# Patient Record
Sex: FEMALE | Race: WHITE | Employment: UNEMPLOYED | URBAN - METROPOLITAN AREA
[De-identification: names, ages, dates, MRNs, and addresses within clinical notes are randomized per-mention and may not be internally consistent; named-entity substitution may affect disease eponyms.]

---

## 2017-02-22 ENCOUNTER — OFFICE VISIT (OUTPATIENT)
Dept: FAMILY MEDICINE CLINIC | Facility: CLINIC | Age: 57
End: 2017-02-22

## 2017-02-22 VITALS
SYSTOLIC BLOOD PRESSURE: 120 MMHG | BODY MASS INDEX: 24 KG/M2 | TEMPERATURE: 98 F | RESPIRATION RATE: 29 BRPM | WEIGHT: 151 LBS | HEART RATE: 68 BPM | OXYGEN SATURATION: 98 % | DIASTOLIC BLOOD PRESSURE: 78 MMHG

## 2017-02-22 DIAGNOSIS — M54.12 CERVICAL RADICULAR PAIN: Primary | ICD-10-CM

## 2017-02-22 DIAGNOSIS — M79.671 FOOT PAIN, RIGHT: ICD-10-CM

## 2017-02-22 DIAGNOSIS — L57.0 ACTINIC KERATOSIS: ICD-10-CM

## 2017-02-22 PROCEDURE — 99214 OFFICE O/P EST MOD 30 MIN: CPT | Performed by: FAMILY MEDICINE

## 2017-02-22 RX ORDER — ONDANSETRON 8 MG/1
8 TABLET, ORALLY DISINTEGRATING ORAL EVERY 8 HOURS PRN
Qty: 20 TABLET | Refills: 0 | Status: SHIPPED | OUTPATIENT
Start: 2017-02-22 | End: 2017-03-04 | Stop reason: ALTCHOICE

## 2017-02-22 NOTE — PROGRESS NOTES
Here with episodes of a heavy sensation when she wakes up in both arms. No tingling. It promptly resolves. She has had a history of some neck pain. She underwent some physical therapy of few years back which did help.   The neck pain is intermittent and 1,000 Units by mouth daily. Disp:  Rfl:    GLUCOSAMINE CHONDROITIN COMPLX OR Take  by mouth. Disp:  Rfl:    Pyridoxine HCl (VITAMIN B-6 OR) Take  by mouth. Take one tablet daily Disp:  Rfl:    Multiple Vitamins-Minerals (MULTIVITAMIN OR) Take  by mouth.  Alice Garcia stress related. History of psoriatic arthritis in the hands, will need a follow-up in the neck. #2 right foot pain #3 actinic keratosis    Plans x-rays of the cervical spine. X-ray of the right foot.   Follow-up for repeat cryotherapy of the nose if nece

## 2017-02-25 ENCOUNTER — HOSPITAL ENCOUNTER (OUTPATIENT)
Dept: GENERAL RADIOLOGY | Age: 57
Discharge: HOME | End: 2017-02-25
Attending: FAMILY MEDICINE
Payer: COMMERCIAL

## 2017-02-25 DIAGNOSIS — M79.671 FOOT PAIN, RIGHT: ICD-10-CM

## 2017-02-25 DIAGNOSIS — M54.12 CERVICAL RADICULAR PAIN: ICD-10-CM

## 2017-02-25 PROCEDURE — 72050 X-RAY EXAM NECK SPINE 4/5VWS: CPT

## 2017-02-25 PROCEDURE — 73630 X-RAY EXAM OF FOOT: CPT

## 2017-03-04 ENCOUNTER — OFFICE VISIT (OUTPATIENT)
Dept: FAMILY MEDICINE CLINIC | Facility: CLINIC | Age: 57
End: 2017-03-04

## 2017-03-04 VITALS
SYSTOLIC BLOOD PRESSURE: 104 MMHG | HEART RATE: 69 BPM | BODY MASS INDEX: 23.98 KG/M2 | OXYGEN SATURATION: 98 % | WEIGHT: 151 LBS | RESPIRATION RATE: 20 BRPM | HEIGHT: 66.5 IN | TEMPERATURE: 98 F | DIASTOLIC BLOOD PRESSURE: 70 MMHG

## 2017-03-04 DIAGNOSIS — J01.00 ACUTE MAXILLARY SINUSITIS, RECURRENCE NOT SPECIFIED: ICD-10-CM

## 2017-03-04 DIAGNOSIS — H66.002 ACUTE SUPPURATIVE OTITIS MEDIA OF LEFT EAR WITHOUT SPONTANEOUS RUPTURE OF TYMPANIC MEMBRANE, RECURRENCE NOT SPECIFIED: Primary | ICD-10-CM

## 2017-03-04 PROCEDURE — 99213 OFFICE O/P EST LOW 20 MIN: CPT | Performed by: NURSE PRACTITIONER

## 2017-03-04 RX ORDER — FLUTICASONE PROPIONATE 50 MCG
2 SPRAY, SUSPENSION (ML) NASAL DAILY
Qty: 1 BOTTLE | Refills: 0 | Status: SHIPPED | OUTPATIENT
Start: 2017-03-04 | End: 2017-05-30

## 2017-03-04 RX ORDER — CLARITHROMYCIN 500 MG/1
500 TABLET, COATED ORAL 2 TIMES DAILY
Qty: 20 TABLET | Refills: 0 | Status: SHIPPED | OUTPATIENT
Start: 2017-03-04 | End: 2017-03-14

## 2017-03-04 NOTE — PROGRESS NOTES
CHIEF COMPLAINT:   Patient presents with:  Ear Pain: Left ear for 1 week      HPI:   Lele Casey is a 55year old female who presents to clinic today with complaints of left ear pain. Has had for 7  days. Pain is described as sharp, intermittent.   R • Acute maxillary sinusitis    • Acute serous otitis media    • Impacted cerumen    • Chronic cystic mastitis    • Personal history of traumatic brain injury      concussion   • Diarrhea    • Normal delivery       Social History:    Smoking Status: Never S Acute maxillary sinusitis, recurrence not specified    PLAN: Meds as listed below.   Comfort measures as described in Patient Instructions    Meds & Refills for this Visit:    Signed Prescriptions Disp Refills    clarithromycin 500 MG Oral Tab 20 tablet 0 · Over-the-counter decongestants may be used unless a similar medicine was prescribed. Nasal sprays work the fastest. Use one that contains phenylephrine or oxymetazoline. First blow the nose gently. Then use the spray.  Do not use these medicines more ofte © 3420-2984 [address], [address] [place]. All rights reserved. This information is not intended as a substitute for professional medical care. Always follow your healthcare professional's instructions.               P

## 2017-03-05 ENCOUNTER — PATIENT MESSAGE (OUTPATIENT)
Dept: FAMILY MEDICINE CLINIC | Facility: CLINIC | Age: 57
End: 2017-03-05

## 2017-03-06 NOTE — TELEPHONE ENCOUNTER
From: Michael Cervantes  To: Lars Sever, DO  Sent: 3/5/2017 8:34 PM CST  Subject: Prescription Question    Hi,  Could you please renew my Estrace cream prescription at [place] [address]? Thanks!    Jillian Montano

## 2017-03-06 NOTE — TELEPHONE ENCOUNTER
Hi,  Could you please renew my Estrace [facility]? Thanks! R Jack Chen 118 to fill?   Last [facility] 7/2016

## 2017-03-17 RX ORDER — ESTRADIOL 0.1 MG/G
CREAM VAGINAL
Qty: 60 G | Refills: 0 | COMMUNITY
Start: 2017-03-17 | End: 2019-02-07 | Stop reason: ALTCHOICE

## 2017-05-23 ENCOUNTER — OFFICE VISIT (OUTPATIENT)
Dept: FAMILY MEDICINE CLINIC | Facility: CLINIC | Age: 57
End: 2017-05-23

## 2017-05-23 VITALS
SYSTOLIC BLOOD PRESSURE: 118 MMHG | DIASTOLIC BLOOD PRESSURE: 80 MMHG | TEMPERATURE: 98 F | HEART RATE: 78 BPM | OXYGEN SATURATION: 98 % | HEIGHT: 66 IN | WEIGHT: 152 LBS | RESPIRATION RATE: 29 BRPM | BODY MASS INDEX: 24.43 KG/M2

## 2017-05-23 DIAGNOSIS — L40.4 GUTTATE PSORIASIS: Primary | ICD-10-CM

## 2017-05-23 PROCEDURE — 99213 OFFICE O/P EST LOW 20 MIN: CPT | Performed by: INTERNAL MEDICINE

## 2017-05-23 RX ORDER — PREDNISONE 20 MG/1
TABLET ORAL
Qty: 10 TABLET | Refills: 0 | Status: SHIPPED | OUTPATIENT
Start: 2017-05-23 | End: 2017-05-30

## 2017-05-24 NOTE — PROGRESS NOTES
Isabell Galarza is a 55year old female. HPI:   Here with flare up of guttate eczema. Recalls years ago having a severe rash, seen frequently by Derm and had light box treatments.    Under a lot of stress recently with end of the school year and her son l mastitis    • Personal history of traumatic brain injury      concussion   • Diarrhea    • Normal delivery       Social History:    Smoking Status: Never Smoker                      Smokeless Status: Never Used                        Alcohol Use: Yes

## 2017-05-30 ENCOUNTER — HOSPITAL ENCOUNTER (OUTPATIENT)
Dept: MAMMOGRAPHY | Age: 57
Discharge: HOME | End: 2017-05-30
Attending: FAMILY MEDICINE
Payer: COMMERCIAL

## 2017-05-30 ENCOUNTER — OFFICE VISIT (OUTPATIENT)
Dept: FAMILY MEDICINE CLINIC | Facility: CLINIC | Age: 57
End: 2017-05-30

## 2017-05-30 VITALS
HEIGHT: 66 IN | WEIGHT: 152 LBS | DIASTOLIC BLOOD PRESSURE: 84 MMHG | RESPIRATION RATE: 16 BRPM | BODY MASS INDEX: 24.43 KG/M2 | TEMPERATURE: 98 F | HEART RATE: 84 BPM | SYSTOLIC BLOOD PRESSURE: 128 MMHG

## 2017-05-30 DIAGNOSIS — Z12.31 ENCOUNTER FOR SCREENING MAMMOGRAM FOR BREAST CANCER: ICD-10-CM

## 2017-05-30 DIAGNOSIS — G50.0 TRIGEMINAL NEURALGIA OF LEFT SIDE OF FACE: Primary | ICD-10-CM

## 2017-05-30 PROCEDURE — 77067 SCR MAMMO BI INCL CAD: CPT | Performed by: FAMILY MEDICINE

## 2017-05-30 PROCEDURE — 77063 BREAST TOMOSYNTHESIS BI: CPT | Performed by: FAMILY MEDICINE

## 2017-05-30 PROCEDURE — 99213 OFFICE O/P EST LOW 20 MIN: CPT | Performed by: FAMILY MEDICINE

## 2017-05-30 RX ORDER — GABAPENTIN 100 MG/1
CAPSULE ORAL
Qty: 270 CAPSULE | Refills: 0 | OUTPATIENT
Start: 2017-05-30

## 2017-05-30 RX ORDER — GABAPENTIN 100 MG/1
100 CAPSULE ORAL 3 TIMES DAILY
Qty: 15 CAPSULE | Refills: 0 | Status: SHIPPED | OUTPATIENT
Start: 2017-05-30 | End: 2017-08-07

## 2017-05-30 NOTE — PROGRESS NOTES
Here with a 2 day episode of numbness on the left cheek. Has a history of left facial tingling. We have discussed trigeminal neuralgia in the past.  In the fall we performed extensive laboratory workup that was unremarkable.   She has never had chickenpox Vitamins-Minerals (MULTIVITAMIN OR) Take  by mouth. Disp:  Rfl:    Cholecalciferol (VITAMIN D) 1000 UNITS Oral Tab Take  by mouth. Disp:  Rfl:    Fish Oil-Cholecalciferol (FISH OIL + D3 OR) Take 200 mg by mouth. Disp:  Rfl:      ALLERGIES:   Allergy;  Veryl Large

## 2017-08-02 ENCOUNTER — OFFICE VISIT (OUTPATIENT)
Dept: FAMILY MEDICINE CLINIC | Facility: CLINIC | Age: 57
End: 2017-08-02

## 2017-08-02 VITALS
DIASTOLIC BLOOD PRESSURE: 70 MMHG | RESPIRATION RATE: 16 BRPM | WEIGHT: 152 LBS | HEIGHT: 66 IN | SYSTOLIC BLOOD PRESSURE: 112 MMHG | HEART RATE: 68 BPM | BODY MASS INDEX: 24.43 KG/M2 | TEMPERATURE: 99 F

## 2017-08-02 DIAGNOSIS — R05.9 COUGH: ICD-10-CM

## 2017-08-02 DIAGNOSIS — H69.82 EUSTACHIAN TUBE DYSFUNCTION, LEFT: Primary | ICD-10-CM

## 2017-08-02 DIAGNOSIS — J34.89 SINUS PRESSURE: ICD-10-CM

## 2017-08-02 PROCEDURE — 99213 OFFICE O/P EST LOW 20 MIN: CPT | Performed by: FAMILY MEDICINE

## 2017-08-02 RX ORDER — PREDNISONE 20 MG/1
TABLET ORAL
Qty: 14 TABLET | Refills: 0 | Status: SHIPPED | OUTPATIENT
Start: 2017-08-02 | End: 2017-08-07

## 2017-08-02 NOTE — PROGRESS NOTES
HPI:    Patient ID: Elvira Bautista is a 55year old female. Ear Pain    There is pain in the left ear. This is a recurrent problem. The current episode started in the past 7 days. The problem occurs constantly. The problem has been waxing and waning. OR) Take  by mouth. Take one tablet daily Disp:  Rfl:    Multiple Vitamins-Minerals (MULTIVITAMIN OR) Take  by mouth. Disp:  Rfl:    Cholecalciferol (VITAMIN D) 1000 UNITS Oral Tab Take  by mouth.  Disp:  Rfl:    Fish Oil-Cholecalciferol (FISH OIL + D3 OR) predniSONE 20 MG Oral Tab; 2.5 tab day 1-4, 2 tab day 5, 1.5 tab day 6, 0.5 tab day 7  Dispense: 14 tablet; Refill: 0    3. Cough  - predniSONE 20 MG Oral Tab; 2.5 tab day 1-4, 2 tab day 5, 1.5 tab day 6, 0.5 tab day 7  Dispense: 14 tablet;  Refill: 0    No

## 2017-08-07 ENCOUNTER — OFFICE VISIT (OUTPATIENT)
Dept: FAMILY MEDICINE CLINIC | Facility: CLINIC | Age: 57
End: 2017-08-07

## 2017-08-07 VITALS
TEMPERATURE: 99 F | WEIGHT: 153 LBS | SYSTOLIC BLOOD PRESSURE: 122 MMHG | OXYGEN SATURATION: 98 % | HEART RATE: 82 BPM | RESPIRATION RATE: 18 BRPM | DIASTOLIC BLOOD PRESSURE: 80 MMHG | HEIGHT: 66.5 IN | BODY MASS INDEX: 24.3 KG/M2

## 2017-08-07 DIAGNOSIS — Z01.419 WELL WOMAN EXAM WITH ROUTINE GYNECOLOGICAL EXAM: Primary | ICD-10-CM

## 2017-08-07 DIAGNOSIS — I34.1 MVP (MITRAL VALVE PROLAPSE): ICD-10-CM

## 2017-08-07 DIAGNOSIS — D22.9 ATYPICAL NEVI: ICD-10-CM

## 2017-08-07 DIAGNOSIS — Z12.31 ENCOUNTER FOR SCREENING MAMMOGRAM FOR BREAST CANCER: ICD-10-CM

## 2017-08-07 PROCEDURE — 99396 PREV VISIT EST AGE 40-64: CPT | Performed by: FAMILY MEDICINE

## 2017-08-07 NOTE — PROGRESS NOTES
HPI:   Isabell Galarza is a 55year old female who presents for a complete physical exam.     Wt Readings from Last 6 Encounters:  08/07/17 : 153 lb  08/02/17 : 152 lb  05/30/17 : 152 lb  05/23/17 : 152 lb  03/04/17 : 151 lb  02/22/17 : 151 lb    Body mas cerumen  No date: Normal delivery  No date: Other acute otitis externa  No date: Personal history of traumatic brain injury      Comment: concussion   No past surgical history on file.    Family History   Problem Relation Age of Onset   • Heart Disease Fath genitalia - no inguinal LAD, no lesions. Speculum exam- introitus is normal,scant discharge,cervix is pink.  Bimanual exam- no adnexal masses or tenderness  RECTAL:good rectal tone,no mass, brown stool, stool is OB negative  MUSCULOSKELETAL: back is not te

## 2017-09-16 ENCOUNTER — PATIENT MESSAGE (OUTPATIENT)
Dept: FAMILY MEDICINE CLINIC | Facility: CLINIC | Age: 57
End: 2017-09-16

## 2017-09-18 NOTE — TELEPHONE ENCOUNTER
From: Christa Cano  To: Sharon Hodges DO  Sent: 9/16/2017 7:40 PM CDT  Subject: Visit Follow-up Question    Hi Dr. Abhinav Smith,   When I saw you in August, we talked about an area of concern on the back of my neck.  You had said you'd refer me to a dermato

## 2017-09-20 ENCOUNTER — PATIENT MESSAGE (OUTPATIENT)
Dept: FAMILY MEDICINE CLINIC | Facility: CLINIC | Age: 57
End: 2017-09-20

## 2017-09-20 DIAGNOSIS — R21 RASH: ICD-10-CM

## 2017-09-20 DIAGNOSIS — Z13.89 SKIN CONDITION SCREENING: Primary | ICD-10-CM

## 2017-09-21 NOTE — TELEPHONE ENCOUNTER
----- Message from Del Silver sent at 9/20/2017  9:06 PM CDT -----  Regarding: Visit Follow-up Question  Contact: [phone]  Hi,  I asked for a referral to the dermatologist,  and was asked who I see. I see Dr Ninoska Samuels in [place].   By the way, y

## 2017-10-06 ENCOUNTER — OFFICE VISIT (OUTPATIENT)
Dept: FAMILY MEDICINE CLINIC | Facility: CLINIC | Age: 57
End: 2017-10-06

## 2017-10-06 VITALS
HEIGHT: 66 IN | SYSTOLIC BLOOD PRESSURE: 106 MMHG | OXYGEN SATURATION: 98 % | WEIGHT: 153 LBS | BODY MASS INDEX: 24.59 KG/M2 | RESPIRATION RATE: 16 BRPM | HEART RATE: 70 BPM | DIASTOLIC BLOOD PRESSURE: 78 MMHG | TEMPERATURE: 98 F

## 2017-10-06 DIAGNOSIS — H66.005 RECURRENT ACUTE SUPPURATIVE OTITIS MEDIA WITHOUT SPONTANEOUS RUPTURE OF LEFT TYMPANIC MEMBRANE: ICD-10-CM

## 2017-10-06 DIAGNOSIS — J01.00 ACUTE MAXILLARY SINUSITIS, RECURRENCE NOT SPECIFIED: Primary | ICD-10-CM

## 2017-10-06 DIAGNOSIS — H69.82 DYSFUNCTION OF LEFT EUSTACHIAN TUBE: ICD-10-CM

## 2017-10-06 PROCEDURE — 99213 OFFICE O/P EST LOW 20 MIN: CPT | Performed by: PHYSICIAN ASSISTANT

## 2017-10-06 RX ORDER — CLARITHROMYCIN 500 MG/1
500 TABLET, COATED ORAL 2 TIMES DAILY
Qty: 20 TABLET | Refills: 0 | Status: SHIPPED | OUTPATIENT
Start: 2017-10-06 | End: 2017-11-07 | Stop reason: ALTCHOICE

## 2017-10-06 NOTE — PROGRESS NOTES
HPI:   Robbie Lopez is a 65year old female who presents for sinus symptoms for  2  weeks. Patient reports congestion, ear pain, sinus pain, prior history of sinusitis, denies fever, denies cough. Pt has history of sinus infections and ear infection.  P Smokeless tobacco: Never Used                      Alcohol use:  Yes              Comment: social          REVIEW OF SYSTEMS:   GENERAL: feels well otherwise  SKIN: no rashes  EYES:denies blurred vision or double vision  HEENT:+congestion, +left si measures. Follow up if symptoms persist or worsen. Jacey Bowden was given an opportunity to ask questions and verbalized understanding of care.

## 2017-10-20 ENCOUNTER — OFFICE VISIT (OUTPATIENT)
Dept: FAMILY MEDICINE CLINIC | Facility: CLINIC | Age: 57
End: 2017-10-20

## 2017-10-20 VITALS
WEIGHT: 153 LBS | BODY MASS INDEX: 24.59 KG/M2 | SYSTOLIC BLOOD PRESSURE: 114 MMHG | HEIGHT: 66 IN | DIASTOLIC BLOOD PRESSURE: 86 MMHG | RESPIRATION RATE: 16 BRPM | TEMPERATURE: 99 F | HEART RATE: 98 BPM

## 2017-10-20 DIAGNOSIS — S16.1XXA STRAIN OF NECK MUSCLE, INITIAL ENCOUNTER: Primary | ICD-10-CM

## 2017-10-20 DIAGNOSIS — F41.9 ANXIETY: ICD-10-CM

## 2017-10-20 DIAGNOSIS — M54.10 RADICULOPATHY, UNSPECIFIED SPINAL REGION: ICD-10-CM

## 2017-10-20 PROCEDURE — 99214 OFFICE O/P EST MOD 30 MIN: CPT | Performed by: FAMILY MEDICINE

## 2017-10-20 RX ORDER — ALPRAZOLAM 0.25 MG/1
0.25 TABLET ORAL 2 TIMES DAILY PRN
Qty: 20 TABLET | Refills: 0 | Status: SHIPPED | OUTPATIENT
Start: 2017-10-20 | End: 2017-11-07 | Stop reason: ALTCHOICE

## 2017-10-20 RX ORDER — CYCLOBENZAPRINE HCL 5 MG
5 TABLET ORAL NIGHTLY PRN
Qty: 30 TABLET | Refills: 1 | Status: SHIPPED | OUTPATIENT
Start: 2017-10-20 | End: 2018-05-08

## 2017-10-24 NOTE — PROGRESS NOTES
HPI:    Patient ID: Ronald Hollins is a 65year old female. Neck Pain    This is a chronic problem. The current episode started more than 1 month ago. The problem occurs constantly. The problem has been gradually worsening.  The pain is present in the pharmacist at [facility]. Pharmacy. Disp: 60 g Rfl: 0   Vitamin C 500 MG Oral Tab Take 500 mg by mouth daily.  Disp:  Rfl:    Levothyroxine Sodium 75 MCG Oral Tab TAKE 1 TABLET BY MOUTH EVERY DAY BEFORE BREAKFAST Disp: 90 tablet Rfl: 3   Calcium Citrate (CI Vitals reviewed. ASSESSMENT/PLAN:   Strain of neck muscle, initial encounter  (primary encounter diagnosis)  Anxiety  Radiculopathy, unspecified spinal region    1.  Strain of neck muscle, initial encounter  - Cyclobenzaprine HCl 5 MG Oral Tab

## 2017-11-07 ENCOUNTER — OFFICE VISIT (OUTPATIENT)
Dept: FAMILY MEDICINE CLINIC | Facility: CLINIC | Age: 57
End: 2017-11-07

## 2017-11-07 VITALS
HEIGHT: 67 IN | SYSTOLIC BLOOD PRESSURE: 118 MMHG | TEMPERATURE: 98 F | RESPIRATION RATE: 16 BRPM | DIASTOLIC BLOOD PRESSURE: 78 MMHG | WEIGHT: 152 LBS | HEART RATE: 72 BPM | BODY MASS INDEX: 23.86 KG/M2

## 2017-11-07 DIAGNOSIS — F41.9 ANXIETY: ICD-10-CM

## 2017-11-07 DIAGNOSIS — Z23 FLU VACCINE NEED: ICD-10-CM

## 2017-11-07 DIAGNOSIS — G50.0 TRIGEMINAL NEURALGIA OF LEFT SIDE OF FACE: Primary | ICD-10-CM

## 2017-11-07 PROCEDURE — 90686 IIV4 VACC NO PRSV 0.5 ML IM: CPT | Performed by: FAMILY MEDICINE

## 2017-11-07 PROCEDURE — 90471 IMMUNIZATION ADMIN: CPT | Performed by: FAMILY MEDICINE

## 2017-11-07 PROCEDURE — 99213 OFFICE O/P EST LOW 20 MIN: CPT | Performed by: FAMILY MEDICINE

## 2017-11-07 NOTE — PROGRESS NOTES
Here with off again on again discomfort in the right side of the face consistent with trigeminal neuralgia. She also having discomfort in the neck.   She finds that she and her  go out of town for the weekend and she has a different bed, the pain is (CITRACAL OR) Take  by mouth. Disp:  Rfl:    vitamin E 1000 UNITS Oral Cap Take 1,000 Units by mouth daily. Disp:  Rfl:    GLUCOSAMINE CHONDROITIN COMPLX OR Take  by mouth. Disp:  Rfl:    Pyridoxine HCl (VITAMIN B-6 OR) Take  by mouth.  Take one tablet shaina psychologist of the office.

## 2017-11-08 ENCOUNTER — OFFICE VISIT (OUTPATIENT)
Dept: PHYSICAL THERAPY | Age: 57
End: 2017-11-08
Attending: FAMILY MEDICINE
Payer: COMMERCIAL

## 2017-11-08 DIAGNOSIS — S16.1XXA STRAIN OF NECK MUSCLE, INITIAL ENCOUNTER: ICD-10-CM

## 2017-11-08 DIAGNOSIS — M54.10 RADICULOPATHY, UNSPECIFIED SPINAL REGION: ICD-10-CM

## 2017-11-08 PROCEDURE — 97110 THERAPEUTIC EXERCISES: CPT

## 2017-11-08 PROCEDURE — 97161 PT EVAL LOW COMPLEX 20 MIN: CPT

## 2017-11-09 NOTE — PROGRESS NOTES
SPINE EVALUATION:   Referring Physician: Dr. Satnam De La O  Diagnosis: cervical neck pain R upper trap  L trigeminal nerve pain  Date of Service: 11/9/2017     PATIENT SUMMARY   Mac Artis is a 65year old y/o female who presents to therapy today with com looking over her shoulder  Neuro Screen: normal today. No loss of motor control in the facial muscles. No numbness or tingling into the UE's. Pt does report waking up when she lays on her left side in which her arm feels like it fell asleep.  Improved withi promote upright posturing and decreased pain with ADls  (10 visits)  · Pt will report decreased frequency of headaches to <0x/week (10 visits)  · Pt will demonstrate improved cervical intrinsic strength to 5/5 to allow improved cervical stabilization with

## 2017-11-10 ENCOUNTER — OFFICE VISIT (OUTPATIENT)
Dept: PHYSICAL THERAPY | Age: 57
End: 2017-11-10
Attending: FAMILY MEDICINE
Payer: COMMERCIAL

## 2017-11-10 PROCEDURE — 97110 THERAPEUTIC EXERCISES: CPT

## 2017-11-10 PROCEDURE — 97140 MANUAL THERAPY 1/> REGIONS: CPT

## 2017-11-12 NOTE — PROGRESS NOTES
Dx:  Cervical spine pain / TMJ L dysfunction      Authorized # of Visits:   10       Next MD visit: none scheduled  Fall Risk: standard         Precautions: n/a             Subjective:  Sore following the eval. Very stiff today.  Stressed at school today an to maintain progress achieved in PT (10 visits)    Plan:  Cont with PT- issue rows in standing next appt. Retractions on the wall.    Date: 11/11/2017 TX#: 2/ 10  Date:               TX#: 3/   Date:               TX#: 4/ Date:               TX#: 5/ Date:

## 2017-11-15 ENCOUNTER — OFFICE VISIT (OUTPATIENT)
Dept: PHYSICAL THERAPY | Age: 57
End: 2017-11-15
Attending: FAMILY MEDICINE
Payer: COMMERCIAL

## 2017-11-15 PROCEDURE — 97110 THERAPEUTIC EXERCISES: CPT

## 2017-11-15 PROCEDURE — 97140 MANUAL THERAPY 1/> REGIONS: CPT

## 2017-11-15 NOTE — PROGRESS NOTES
Dx:  Cervical spine pain / TMJ L dysfunction      Authorized # of Visits:   10       Next MD visit: none scheduled  Fall Risk: standard         Precautions: n/a             Subjective:  Sore following the eval. Very stiff today.  Stressed at school today an TX#: 4/ Date:               TX#: 5/ Date:               TX#: 6/ Date:               TX#: 7/ Date:               TX#: 8/   UBE 1 mins fwd  1 mins bkwd UBE 1 min fwd  1 min bkwd        Prone Upper trap STM  T4-T12 interlocking mobs grade 1-2  A

## 2017-11-17 ENCOUNTER — OFFICE VISIT (OUTPATIENT)
Dept: PHYSICAL THERAPY | Age: 57
End: 2017-11-17
Attending: FAMILY MEDICINE
Payer: COMMERCIAL

## 2017-11-17 PROCEDURE — 97140 MANUAL THERAPY 1/> REGIONS: CPT

## 2017-11-17 PROCEDURE — 97110 THERAPEUTIC EXERCISES: CPT

## 2017-11-17 NOTE — PROGRESS NOTES
Dx:  Cervical spine pain / TMJ L dysfunction      Authorized # of Visits:   10       Next MD visit: none scheduled  Fall Risk: standard         Precautions: n/a             Subjective: after last appt my head was really bad and my right shoulder .  Medial s Date:               TX#: 3/10  11/15/17 Date:               TX#: 4/10  11/17/17 Date:               TX#: 5/ Date:               TX#: 6/ Date:               TX#: 7/ Date:               TX#: 8/   UBE 1 mins fwd  1 mins bkwd UBE 1 min fwd  1 min bkwd UBE warm

## 2017-11-20 ENCOUNTER — HOSPITAL ENCOUNTER (OUTPATIENT)
Dept: CV DIAGNOSTICS | Facility: HOSPITAL | Age: 57
Discharge: HOME | End: 2017-11-20
Attending: FAMILY MEDICINE
Payer: COMMERCIAL

## 2017-11-20 DIAGNOSIS — I34.1 MVP (MITRAL VALVE PROLAPSE): ICD-10-CM

## 2017-11-20 PROCEDURE — 93306 TTE W/DOPPLER COMPLETE: CPT | Performed by: FAMILY MEDICINE

## 2017-11-21 ENCOUNTER — OFFICE VISIT (OUTPATIENT)
Dept: PHYSICAL THERAPY | Age: 57
End: 2017-11-21
Attending: FAMILY MEDICINE
Payer: COMMERCIAL

## 2017-11-21 PROCEDURE — 97110 THERAPEUTIC EXERCISES: CPT

## 2017-11-21 PROCEDURE — 97140 MANUAL THERAPY 1/> REGIONS: CPT

## 2017-11-21 NOTE — PROGRESS NOTES
Dx:  Cervical spine pain / TMJ L dysfunction      Authorized # of Visits:   10       Next MD visit: none scheduled  Fall Risk: standard         Precautions: n/a             Subjective: best I have felt in a long time. I can look both ways.  Not sure if it i fwd  1 mins bkwd UBE 1 min fwd  1 min bkwd UBE warm up 3 mins  UBE 2 mins fwd  2 mins bkwd      Prone Upper trap STM  T4-T12 interlocking mobs grade 1-2  Attempted cervical PA glides grade 1 too painful  Occipital release  L TMJ STM internal and external

## 2017-11-29 ENCOUNTER — OFFICE VISIT (OUTPATIENT)
Dept: PHYSICAL THERAPY | Age: 57
End: 2017-11-29
Attending: FAMILY MEDICINE
Payer: COMMERCIAL

## 2017-11-29 PROCEDURE — 97110 THERAPEUTIC EXERCISES: CPT

## 2017-11-29 PROCEDURE — 97140 MANUAL THERAPY 1/> REGIONS: CPT

## 2017-11-30 NOTE — PROGRESS NOTES
Dx:  Cervical spine pain / TMJ L dysfunction      Authorized # of Visits:   10       Next MD visit: none scheduled  Fall Risk: standard         Precautions: n/a             Subjective:  I feel less stress. Pain in the SI joints ( per pt demo today).  I just 5/5 to promote improved upright posturing and decreased pain with ADLs  (10 visits)  · Pt will be independent and compliant with comprehensive HEP to maintain progress achieved in PT (10 visits)    Plan:  Cont with PT- reassess next visit to send a PN to [facility] holds                        MHP 10 mins thoracic and cervical spine   MHP 10 mins                        Skilled Services:  Manual PT today and skilled stretches issued to pt     Charges:  MT 2 EX 1      Total Timed Treatment:  45  min  Total Treatment Ti

## 2017-12-01 ENCOUNTER — APPOINTMENT (OUTPATIENT)
Dept: PHYSICAL THERAPY | Age: 57
End: 2017-12-01
Attending: FAMILY MEDICINE
Payer: COMMERCIAL

## 2017-12-06 ENCOUNTER — OFFICE VISIT (OUTPATIENT)
Dept: PHYSICAL THERAPY | Age: 57
End: 2017-12-06
Attending: FAMILY MEDICINE
Payer: COMMERCIAL

## 2017-12-06 PROCEDURE — 97110 THERAPEUTIC EXERCISES: CPT

## 2017-12-06 PROCEDURE — 97140 MANUAL THERAPY 1/> REGIONS: CPT

## 2017-12-07 NOTE — PROGRESS NOTES
Dx:  Cervical spine pain / TMJ L dysfunction      Authorized # of Visits:   10       Next MD visit: none scheduled  Fall Risk: standard         Precautions: n/a             Subjective:   I feel less stressed today.  Feeling a little more tingling into the L will be independent and compliant with comprehensive HEP to maintain progress achieved in PT (10 visits)- improved    Plan:   Cont with PT for thoracic and cervical ROM and strength. Improve posture.  Pt has made gains with less radicular pain in the L medi thoracic extension  5 mins   foam  1/2 roller   5 mins   Rows with red band  Retractions and Post PT   Rows red band  10 x    Lateral side bend Wall push ups  10 x 2     cervical retractions with 10 sec holds  Ball at head 10 reps 10 x     UT and levator s

## 2017-12-12 ENCOUNTER — OFFICE VISIT (OUTPATIENT)
Dept: PHYSICAL THERAPY | Age: 57
End: 2017-12-12
Attending: FAMILY MEDICINE
Payer: COMMERCIAL

## 2017-12-12 PROCEDURE — 97110 THERAPEUTIC EXERCISES: CPT

## 2017-12-12 PROCEDURE — 97140 MANUAL THERAPY 1/> REGIONS: CPT

## 2017-12-12 NOTE — PROGRESS NOTES
Dx:  Cervical spine pain / TMJ L dysfunction      Authorized # of Visits:   10       Next MD visit: none scheduled  Fall Risk: standard         Precautions: n/a             Subjective:   I feel much better, still I know I need to talk to someone about my s comprehensive HEP to maintain progress achieved in PT (10 visits)- improved    Plan:   Cont with PT for thoracic and cervical ROM and strength. Improve posture. Pt has made gains with less radicular pain in the L medial scap region.  Pt does not stretch fol bouts grade 4-5    C spine PA glides grade 2-3   Mult bouts  STM to Upper trap bilaterally     15 mins   Foam and sheets for thoracic extension  5 mins   foam  1/2 roller   5 mins   Rows with red band  Retractions and Post PT   Rows red band  10 x    Later

## 2017-12-21 ENCOUNTER — OFFICE VISIT (OUTPATIENT)
Dept: PHYSICAL THERAPY | Age: 57
End: 2017-12-21
Attending: FAMILY MEDICINE
Payer: COMMERCIAL

## 2017-12-21 PROCEDURE — 97110 THERAPEUTIC EXERCISES: CPT

## 2017-12-21 PROCEDURE — 97140 MANUAL THERAPY 1/> REGIONS: CPT

## 2017-12-22 NOTE — PROGRESS NOTES
Dx:  Cervical spine pain / TMJ L dysfunction      Authorized # of Visits:   10       Next MD visit: none scheduled  Fall Risk: standard         Precautions: n/a             Subjective:   2/10 neck pain, feeling much better, last day of school today so a lo Pt has made gains with less radicular pain in the L medial scap region. Pt does not stretch following her training sessions - encouraged to perform her HEP to help keep the cervical spine length carry over and avoid possible injuries.  Cont with PT for 2x w mins   Foam and sheets for thoracic extension  5 mins   foam  1/2 roller   5 mins   Rows with red band  Retractions and Post PT   Rows red band  10 x    Lateral side bend Wall push ups  10 x 2     cervical retractions with 10 sec holds  Ball at head 10 rep

## 2017-12-26 ENCOUNTER — OFFICE VISIT (OUTPATIENT)
Dept: PHYSICAL THERAPY | Age: 57
End: 2017-12-26
Attending: FAMILY MEDICINE
Payer: COMMERCIAL

## 2017-12-26 PROCEDURE — 97140 MANUAL THERAPY 1/> REGIONS: CPT

## 2017-12-26 PROCEDURE — 97110 THERAPEUTIC EXERCISES: CPT

## 2017-12-26 NOTE — PROGRESS NOTES
Dx:  Cervical spine pain / TMJ L dysfunction      Authorized # of Visits:   10       Next MD visit: none scheduled  Fall Risk: standard         Precautions: n/a             Subjective:    Feeling pretty good. Less numbness in my L jaw.  I do feel that my pa achieved in PT (10 visits)- improved    Plan: pt was planning on cleaning today- review of stretches and to avoid over head activities. Assess effects of manual therapy next appt.    Date: 11/11/2017 TX#: 2/8 Date:               TX#: 3/8  11/15/17 Date: manipulation T4-T12 mult bouts   Grade 1-2 cervical spine bilaterally     Distraction with pt in supine     25 mins total   Foam and sheets for thoracic extension  5 mins   foam  1/2 roller   5 mins   Rows with red band  Retractions and Post PT   Rows red

## 2017-12-28 ENCOUNTER — OFFICE VISIT (OUTPATIENT)
Dept: PHYSICAL THERAPY | Age: 57
End: 2017-12-28
Attending: FAMILY MEDICINE
Payer: COMMERCIAL

## 2017-12-28 PROCEDURE — 97140 MANUAL THERAPY 1/> REGIONS: CPT

## 2017-12-28 PROCEDURE — 97110 THERAPEUTIC EXERCISES: CPT

## 2017-12-29 NOTE — PROGRESS NOTES
Dx:  Cervical spine pain / TMJ L dysfunction      Authorized # of Visits:   10       Next MD visit: none scheduled  Fall Risk: standard         Precautions: n/a             Subjective:    Feeling pretty good.  I do feel occasional increased spasms in the th comprehensive HEP to maintain progress achieved in PT (10 visits)- improved    Plan: pt was planning on cleaning today- review of stretches and to avoid over head activities. Assess effects of manual therapy next appt.    Date: 11/11/2017 TX#: 2/8 Date: trap bilaterally     15 mins Manual PT: STm to the Upper traps.   Occipital release interlocking manipulation T4-T12 mult bouts   Grade 1-2 cervical spine bilaterally     Distraction with pt in supine     25 mins total   Foam and sheets for thoracic extensi

## 2018-01-06 DIAGNOSIS — E04.9 GOITER: Primary | ICD-10-CM

## 2018-01-07 RX ORDER — LEVOTHYROXINE SODIUM 0.07 MG/1
TABLET ORAL
Qty: 90 TABLET | Refills: 0 | Status: SHIPPED | OUTPATIENT
Start: 2018-01-07 | End: 2018-01-09

## 2018-01-09 ENCOUNTER — OFFICE VISIT (OUTPATIENT)
Dept: FAMILY MEDICINE CLINIC | Facility: CLINIC | Age: 58
End: 2018-01-09

## 2018-01-09 VITALS
WEIGHT: 154 LBS | DIASTOLIC BLOOD PRESSURE: 74 MMHG | BODY MASS INDEX: 24.17 KG/M2 | HEART RATE: 66 BPM | RESPIRATION RATE: 16 BRPM | SYSTOLIC BLOOD PRESSURE: 124 MMHG | TEMPERATURE: 99 F | HEIGHT: 67 IN

## 2018-01-09 DIAGNOSIS — M54.12 CERVICAL RADICULOPATHY: Primary | ICD-10-CM

## 2018-01-09 DIAGNOSIS — M75.21 BICEPS TENDINITIS OF RIGHT UPPER EXTREMITY: ICD-10-CM

## 2018-01-09 PROCEDURE — 99214 OFFICE O/P EST MOD 30 MIN: CPT | Performed by: INTERNAL MEDICINE

## 2018-01-09 RX ORDER — PREDNISONE 20 MG/1
TABLET ORAL
Qty: 8 TABLET | Refills: 0 | Status: SHIPPED | OUTPATIENT
Start: 2018-01-09 | End: 2018-02-17

## 2018-01-09 RX ORDER — IBUPROFEN 800 MG/1
800 TABLET ORAL
Qty: 20 TABLET | Refills: 0 | Status: SHIPPED | OUTPATIENT
Start: 2018-01-09 | End: 2018-01-19

## 2018-01-10 ENCOUNTER — APPOINTMENT (OUTPATIENT)
Dept: PHYSICAL THERAPY | Age: 58
End: 2018-01-10
Attending: FAMILY MEDICINE
Payer: COMMERCIAL

## 2018-01-12 NOTE — PROGRESS NOTES
Ragini Wade is a 65year old female. HPI:   Here with right upper arm discomfort for a couple of months. Gets some relief from traction thru PT for her upper back and neck. No numbness to the arm. No weakness.    Physical therapist thought she may hav externa    • Personal history of traumatic brain injury     concussion      Social History:  Smoking status: Never Smoker                                                              Smokeless tobacco: Never Used                      Alcohol use:  Yes

## 2018-01-19 ENCOUNTER — APPOINTMENT (OUTPATIENT)
Dept: PHYSICAL THERAPY | Age: 58
End: 2018-01-19
Attending: FAMILY MEDICINE
Payer: COMMERCIAL

## 2018-01-19 PROCEDURE — 97110 THERAPEUTIC EXERCISES: CPT

## 2018-01-19 PROCEDURE — 97140 MANUAL THERAPY 1/> REGIONS: CPT

## 2018-01-21 NOTE — PROGRESS NOTES
Dx:  Cervical spine pain / TMJ L dysfunction      Authorized # of Visits:   10       Next MD visit: none scheduled  Fall Risk: standard         Precautions: n/a             Subjective:   Feeling really good.  I did see my MD for the R shoulder pain  Objecti 3/8  11/15/17 Date:               TX#: 4/8 11/17/17 Date:               TX#: 5/8 11/21/17 Date:               TX#: 6/8 11/29/17 Date:               TX#: 7/8 12/7/17 Date:               TX#: 8/8 9/14 12/21/ extension  5 mins   foam  1/2 roller   5 mins   Rows with red band  Retractions and Post PT   Rows red band  10 x    Lateral side bend Wall push ups  10 x 2     cervical retractions with 10 sec holds  Ball at head 10 reps 10 x  Rows high and low  10 x w gr

## 2018-01-24 ENCOUNTER — OFFICE VISIT (OUTPATIENT)
Dept: PHYSICAL THERAPY | Age: 58
End: 2018-01-24
Attending: FAMILY MEDICINE
Payer: COMMERCIAL

## 2018-01-24 PROCEDURE — 97110 THERAPEUTIC EXERCISES: CPT

## 2018-01-24 PROCEDURE — 97140 MANUAL THERAPY 1/> REGIONS: CPT

## 2018-01-24 NOTE — PROGRESS NOTES
Dx:  Cervical spine pain / TMJ L dysfunction      Authorized # of Visits:   10       Next MD visit: none scheduled  Fall Risk: standard         Precautions: n/a             Subjective:  Feeling pretty good. I was able to reach up for an object pain free. 9/14                                           12/21/17 10/14                   11/14    12/14  12/26/17         12/28/17       1/19/18 2/8  1/24/18   UBE 1 mins fwd  1 mins bkwd UBE 1 min fwd  1 min bkwd UBE warm up 3 mins  UBE 2 mins fwd  2 mins bkwd Ass stretch in door 3 x 30 secs    Foam and sheets for thoracic extension  5 mins   foam  1/2 roller   5 mins   Rows with red band  Retractions and Post PT   Rows red band  10 x    Lateral side bend Wall push ups  10 x 2     cervical retractions with 10 sec ho

## 2018-02-07 ENCOUNTER — APPOINTMENT (OUTPATIENT)
Dept: PHYSICAL THERAPY | Age: 58
End: 2018-02-07
Attending: FAMILY MEDICINE
Payer: COMMERCIAL

## 2018-02-08 ENCOUNTER — E-VISIT (OUTPATIENT)
Dept: FAMILY MEDICINE CLINIC | Facility: CLINIC | Age: 58
End: 2018-02-08

## 2018-02-08 DIAGNOSIS — J32.9 SINUSITIS, UNSPECIFIED CHRONICITY, UNSPECIFIED LOCATION: Primary | ICD-10-CM

## 2018-02-08 PROCEDURE — 98969 ONLINE SERVICE BY HC PRO: CPT | Performed by: NURSE PRACTITIONER

## 2018-02-08 RX ORDER — DOXYCYCLINE HYCLATE 100 MG
100 TABLET ORAL 2 TIMES DAILY
Qty: 20 TABLET | Refills: 0 | Status: SHIPPED | OUTPATIENT
Start: 2018-02-08 | End: 2018-05-08

## 2018-02-08 RX ORDER — FLUTICASONE PROPIONATE 50 MCG
2 SPRAY, SUSPENSION (ML) NASAL DAILY
Qty: 1 INHALER | Refills: 0 | Status: SHIPPED | OUTPATIENT
Start: 2018-02-08 | End: 2019-02-07 | Stop reason: ALTCHOICE

## 2018-02-08 NOTE — PROGRESS NOTES
Alfred Melchor is a 65year old female. HPI:   See answers to questions above. Current Outpatient Prescriptions:  Doxycycline Hyclate 100 MG Oral Tab Take 1 tablet (100 mg total) by mouth 2 (two) times daily.  Disp: 20 tablet Rfl: 0   Fluticasone P file.   Family History   Problem Relation Age of Onset   • Heart Disease Father    • Hypertension Father    • Cancer Father      basal cell skin   • Thyroid Disorder Sister    • Hypertension Sister    • Thyroid Disorder Sister    • Cancer Sister      basal

## 2018-02-14 ENCOUNTER — OFFICE VISIT (OUTPATIENT)
Dept: PHYSICAL THERAPY | Age: 58
End: 2018-02-14
Attending: FAMILY MEDICINE
Payer: COMMERCIAL

## 2018-02-14 PROCEDURE — 97140 MANUAL THERAPY 1/> REGIONS: CPT

## 2018-02-14 PROCEDURE — 97110 THERAPEUTIC EXERCISES: CPT

## 2018-02-14 NOTE — PROGRESS NOTES
Dx:  Cervical spine pain / TMJ L dysfunction      Authorized # of Visits:   10       Next MD visit: none scheduled  Fall Risk: standard         Precautions: n/a             Subjective:  I have been so stressed out that I feel increased B upper trap tightne 7/8 12/7/17 Date:               TX#: 8/8      9/14                                           12/21/17 10/14                   11/14    12/14  12/26/17         12/28/17       1/19/18 2/8  1/24/18 3/8  2/14/18   UBE 1 mins fwd  1 mins bkwd UBE 1 min fwd  1 mins total   Standing IR/ER red band  90 degs flex with red band     pec stretch in door 3 x 30 secs  ER/IR  R green band 20 x   Rows green band - 20 x    STM to B upper traps  PA glides to cervical spine mult bouts grade 3-4  Manual distraction  Occipital Treatment:  45  min  Total Treatment Time:  45  With heat 55 mins

## 2018-02-15 ENCOUNTER — PATIENT MESSAGE (OUTPATIENT)
Dept: FAMILY MEDICINE CLINIC | Facility: CLINIC | Age: 58
End: 2018-02-15

## 2018-02-15 ENCOUNTER — OFFICE VISIT (OUTPATIENT)
Dept: FAMILY MEDICINE CLINIC | Facility: CLINIC | Age: 58
End: 2018-02-15

## 2018-02-15 ENCOUNTER — HOSPITAL ENCOUNTER (OUTPATIENT)
Dept: GENERAL RADIOLOGY | Age: 58
Discharge: HOME | End: 2018-02-15
Attending: FAMILY MEDICINE
Payer: COMMERCIAL

## 2018-02-15 VITALS
RESPIRATION RATE: 18 BRPM | HEIGHT: 67 IN | BODY MASS INDEX: 24.17 KG/M2 | DIASTOLIC BLOOD PRESSURE: 60 MMHG | OXYGEN SATURATION: 98 % | SYSTOLIC BLOOD PRESSURE: 110 MMHG | WEIGHT: 154 LBS | HEART RATE: 80 BPM | TEMPERATURE: 101 F

## 2018-02-15 DIAGNOSIS — R05.9 COUGH: Primary | ICD-10-CM

## 2018-02-15 DIAGNOSIS — J11.1 INFLUENZA: ICD-10-CM

## 2018-02-15 DIAGNOSIS — R05.9 COUGH: ICD-10-CM

## 2018-02-15 PROCEDURE — 71046 X-RAY EXAM CHEST 2 VIEWS: CPT | Performed by: FAMILY MEDICINE

## 2018-02-15 PROCEDURE — 99213 OFFICE O/P EST LOW 20 MIN: CPT | Performed by: FAMILY MEDICINE

## 2018-02-15 NOTE — PROGRESS NOTES
Here with her . About 10 days ago developed a flulike illness. She recovered uneventfully. Curiously is that she did have her flu vaccine given to her earlier in the season.   In spite of her cooperation several days ago she redeveloped a flulike

## 2018-02-16 NOTE — TELEPHONE ENCOUNTER
From: Gilberto Antoine  To: Carol Vazquez MD  Sent: 2/15/2018 10:08 PM CST  Subject: Test Results Question    Hi,  I saw Dr Jace Deng today for the flu and had a chest xray.  Dr. Roro Mcghee mentioned that it looks like I might have asthma, which to my knowledge, I

## 2018-02-17 ENCOUNTER — OFFICE VISIT (OUTPATIENT)
Dept: FAMILY MEDICINE CLINIC | Facility: CLINIC | Age: 58
End: 2018-02-17

## 2018-02-17 VITALS
BODY MASS INDEX: 24.46 KG/M2 | DIASTOLIC BLOOD PRESSURE: 84 MMHG | WEIGHT: 154 LBS | HEART RATE: 99 BPM | TEMPERATURE: 98 F | RESPIRATION RATE: 20 BRPM | OXYGEN SATURATION: 100 % | HEIGHT: 66.5 IN | SYSTOLIC BLOOD PRESSURE: 120 MMHG

## 2018-02-17 DIAGNOSIS — J11.1 INFLUENZA: ICD-10-CM

## 2018-02-17 DIAGNOSIS — I88.1 LYMPHADENITIS, CHRONIC: Primary | ICD-10-CM

## 2018-02-17 PROCEDURE — 99213 OFFICE O/P EST LOW 20 MIN: CPT | Performed by: FAMILY MEDICINE

## 2018-02-17 NOTE — PROGRESS NOTES
Here with  I saw her earlier this week for a flulike illness from which she is slowly recovering but still feeling washed out and weak.   This is a woman with a long-standing history of illnesses including rheumatic fever as a child with multiple pne

## 2018-02-20 ENCOUNTER — NURSE ONLY (OUTPATIENT)
Dept: LAB | Age: 58
End: 2018-02-20
Attending: FAMILY MEDICINE
Payer: COMMERCIAL

## 2018-02-20 DIAGNOSIS — J11.1 INFLUENZA: ICD-10-CM

## 2018-02-20 DIAGNOSIS — I88.1 LYMPHADENITIS, CHRONIC: ICD-10-CM

## 2018-02-20 LAB
ALBUMIN SERPL-MCNC: 3.9 G/DL (ref 3.5–4.8)
ALP LIVER SERPL-CCNC: 92 U/L (ref 46–118)
ALT SERPL-CCNC: 19 U/L (ref 14–54)
AST SERPL-CCNC: 15 U/L (ref 15–41)
BASOPHILS # BLD AUTO: 0.01 X10(3) UL (ref 0–0.1)
BASOPHILS NFR BLD AUTO: 0.2 %
BILIRUB SERPL-MCNC: 0.7 MG/DL (ref 0.1–2)
BUN BLD-MCNC: 13 MG/DL (ref 8–20)
CALCIUM BLD-MCNC: 9 MG/DL (ref 8.3–10.3)
CHLORIDE: 104 MMOL/L (ref 101–111)
CHOLEST SMN-MCNC: 139 MG/DL (ref ?–200)
CO2: 29 MMOL/L (ref 22–32)
CREAT BLD-MCNC: 0.79 MG/DL (ref 0.55–1.02)
EOSINOPHIL # BLD AUTO: 0.28 X10(3) UL (ref 0–0.3)
EOSINOPHIL NFR BLD AUTO: 6.7 %
ERYTHROCYTE [DISTWIDTH] IN BLOOD BY AUTOMATED COUNT: 12.1 % (ref 11.5–16)
GLUCOSE BLD-MCNC: 87 MG/DL (ref 70–99)
HCT VFR BLD AUTO: 38.8 % (ref 34–50)
HDLC SERPL-MCNC: 54 MG/DL (ref 45–?)
HDLC SERPL: 2.57 {RATIO} (ref ?–4.44)
HGB BLD-MCNC: 13.4 G/DL (ref 12–16)
IMMATURE GRANULOCYTE COUNT: 0 X10(3) UL (ref 0–1)
IMMATURE GRANULOCYTE RATIO %: 0 %
LDLC SERPL CALC-MCNC: 59 MG/DL (ref ?–130)
LYMPHOCYTES # BLD AUTO: 2.1 X10(3) UL (ref 0.9–4)
LYMPHOCYTES NFR BLD AUTO: 50.4 %
M PROTEIN MFR SERPL ELPH: 7 G/DL (ref 6.1–8.3)
MCH RBC QN AUTO: 32.4 PG (ref 27–33.2)
MCHC RBC AUTO-ENTMCNC: 34.5 G/DL (ref 31–37)
MCV RBC AUTO: 93.7 FL (ref 81–100)
MONOCYTES # BLD AUTO: 0.34 X10(3) UL (ref 0.1–1)
MONOCYTES NFR BLD AUTO: 8.2 %
NEUTROPHIL ABS PRELIM: 1.44 X10 (3) UL (ref 1.3–6.7)
NEUTROPHILS # BLD AUTO: 1.44 X10(3) UL (ref 1.3–6.7)
NEUTROPHILS NFR BLD AUTO: 34.5 %
NONHDLC SERPL-MCNC: 85 MG/DL (ref ?–130)
PLATELET # BLD AUTO: 125 10(3)UL (ref 150–450)
POTASSIUM SERPL-SCNC: 3.7 MMOL/L (ref 3.6–5.1)
RBC # BLD AUTO: 4.14 X10(6)UL (ref 3.8–5.1)
RED CELL DISTRIBUTION WIDTH-SD: 41.6 FL (ref 35.1–46.3)
SODIUM SERPL-SCNC: 139 MMOL/L (ref 136–144)
TRIGL SERPL-MCNC: 130 MG/DL (ref ?–150)
TSI SER-ACNC: 13.8 MIU/ML (ref 0.35–5.5)
VLDLC SERPL CALC-MCNC: 26 MG/DL (ref 5–40)
WBC # BLD AUTO: 4.2 X10(3) UL (ref 4–13)

## 2018-02-20 PROCEDURE — 84443 ASSAY THYROID STIM HORMONE: CPT

## 2018-02-20 PROCEDURE — 85025 COMPLETE CBC W/AUTO DIFF WBC: CPT

## 2018-02-20 PROCEDURE — 80053 COMPREHEN METABOLIC PANEL: CPT

## 2018-02-20 PROCEDURE — 80061 LIPID PANEL: CPT

## 2018-02-20 PROCEDURE — 36415 COLL VENOUS BLD VENIPUNCTURE: CPT

## 2018-02-21 ENCOUNTER — TELEPHONE (OUTPATIENT)
Dept: FAMILY MEDICINE CLINIC | Facility: CLINIC | Age: 58
End: 2018-02-21

## 2018-02-21 DIAGNOSIS — Z79.899 MEDICATION DOSE CHANGED: ICD-10-CM

## 2018-02-21 DIAGNOSIS — R89.9 ABNORMAL LABORATORY TEST RESULT: Primary | ICD-10-CM

## 2018-02-21 DIAGNOSIS — E03.9 HYPOTHYROIDISM, UNSPECIFIED TYPE: ICD-10-CM

## 2018-02-21 RX ORDER — LEVOTHYROXINE SODIUM 0.1 MG/1
100 TABLET ORAL DAILY
Qty: 90 TABLET | Refills: 0 | Status: SHIPPED | OUTPATIENT
Start: 2018-02-21 | End: 2018-02-23

## 2018-02-21 NOTE — TELEPHONE ENCOUNTER
Per JG's review of recent labs: increase levothyroxine to 100mcg and repeat thyroid labs in 6 weeks along with repeat CBC due to platelets low.  .  Call Radiology Department and request comparison study of 6/21/2014 CT Chest with recent Xray Chest, scarring

## 2018-02-23 ENCOUNTER — OFFICE VISIT (OUTPATIENT)
Dept: FAMILY MEDICINE CLINIC | Facility: CLINIC | Age: 58
End: 2018-02-23

## 2018-02-23 VITALS
HEART RATE: 78 BPM | TEMPERATURE: 98 F | WEIGHT: 153 LBS | RESPIRATION RATE: 16 BRPM | HEIGHT: 66.5 IN | DIASTOLIC BLOOD PRESSURE: 76 MMHG | SYSTOLIC BLOOD PRESSURE: 124 MMHG | BODY MASS INDEX: 24.3 KG/M2

## 2018-02-23 DIAGNOSIS — J47.9: ICD-10-CM

## 2018-02-23 DIAGNOSIS — E06.3 CHRONIC LYMPHOCYTIC THYROIDITIS: Primary | ICD-10-CM

## 2018-02-23 PROCEDURE — 99213 OFFICE O/P EST LOW 20 MIN: CPT | Performed by: FAMILY MEDICINE

## 2018-02-23 RX ORDER — ONDANSETRON 8 MG/1
8 TABLET, ORALLY DISINTEGRATING ORAL EVERY 8 HOURS PRN
Qty: 8 TABLET | Refills: 3 | Status: SHIPPED
Start: 2018-02-23 | End: 2018-08-08

## 2018-02-23 RX ORDER — LEVOTHYROXINE SODIUM 0.1 MG/1
100 TABLET ORAL DAILY
Qty: 90 TABLET | Refills: 0 | Status: SHIPPED | OUTPATIENT
Start: 2018-02-23 | End: 2018-07-27

## 2018-02-23 NOTE — PROGRESS NOTES
Follow-up visit. Here with . Very fatigued. TSH returned at just over 12. She has had this before and has had to regulate her doses.   She has been on 75 mcg so we took this opportunity to increase it to 100 mics and repeat the TSH level with ant

## 2018-03-02 ENCOUNTER — OFFICE VISIT (OUTPATIENT)
Dept: PHYSICAL THERAPY | Age: 58
End: 2018-03-02
Attending: FAMILY MEDICINE
Payer: COMMERCIAL

## 2018-03-02 PROCEDURE — 97110 THERAPEUTIC EXERCISES: CPT

## 2018-03-02 PROCEDURE — 97140 MANUAL THERAPY 1/> REGIONS: CPT

## 2018-03-02 NOTE — PROGRESS NOTES
Dx:  Cervical spine pain / TMJ L dysfunction      Authorized # of Visits:   10       Next MD visit: none scheduled  Fall Risk: standard         Precautions: n/a             Subjective:   Pain in the Right bicep tendon. Stiffness in my neck today.  No numbne TX#: 6/8 11/29/17 Date:               TX#: 7/8 12/7/17 Date:               TX#: 8/8      9/14                                           12/21/17  10/14                   11/14    12/14  12/26/17         12/28/17       1/19/18 2/8  1/24/18 3/8  2/14/18 4/ long head of the bicep tendon  PA glides to cervical spine mult bouts grade 3-4 B  15 mins total   Standing IR/ER red band  90 degs flex with red band     pec stretch in door 3 x 30 secs  ER/IR  R green band 20 x   Rows green band - 20 x    STM to B upper mult bout grade 3-4.  Ice R shoulder 10 mins    Pt to use ice at home today 10 mins                  MHP 10 mins thoracic and cervical spine   MHP 10 mins                                       Skilled Services:  Manual PT today and skilled stretches issued

## 2018-03-06 ENCOUNTER — OFFICE VISIT (OUTPATIENT)
Dept: PHYSICAL THERAPY | Age: 58
End: 2018-03-06
Attending: FAMILY MEDICINE
Payer: COMMERCIAL

## 2018-03-06 PROCEDURE — 97140 MANUAL THERAPY 1/> REGIONS: CPT

## 2018-03-06 PROCEDURE — 97110 THERAPEUTIC EXERCISES: CPT

## 2018-03-06 NOTE — PROGRESS NOTES
Dx:  Cervical spine pain / TMJ L dysfunction      Authorized # of Visits:   10       Next MD visit: none scheduled  Fall Risk: standard         Precautions: n/a             Subjective:    My right bicep tendon is very sore. My neck is not too bad.    Object Date: 11/11/2017 TX#: 2/8 Date:               TX#: 3/8  11/15/17 Date:               TX#: 4/8 11/17/17 Date:               TX#: 5/8 11/21/17 Date:               TX#: 6/8 11/29/17 Date:               TX#: 7/8 12/7/17 Date:               TX#: 8/8 to the Upper traps.   Occipital release interlocking manipulation T4-T12 mult bouts   Grade 1-2 cervical spine bilaterally     Distraction with pt in supine     25 mins total STM cervical spine  Tool to the R long head of the bicep tendon  PA lebrondes to cerv 10 mins LH bicep tendon              12/21/17 : STM LHB tendon R ( tool used)  Nustep 5 mins   STM L internal STM   Occipital release/distractions   B STM Upper traps 20 mins    Tongue tapping, mouth puffer exercises, open. close with tongue on mouth 10 x e

## 2018-03-13 ENCOUNTER — OFFICE VISIT (OUTPATIENT)
Dept: PHYSICAL THERAPY | Age: 58
End: 2018-03-13
Attending: FAMILY MEDICINE
Payer: COMMERCIAL

## 2018-03-13 PROCEDURE — 97140 MANUAL THERAPY 1/> REGIONS: CPT

## 2018-03-13 PROCEDURE — 97110 THERAPEUTIC EXERCISES: CPT

## 2018-03-13 NOTE — PROGRESS NOTES
Dx:  Cervical spine pain / TMJ L dysfunction      Authorized # of Visits:   10       Next MD visit: none scheduled  Fall Risk: standard         Precautions: n/a             Subjective:   Stiffness in my neck.  I have been icing my LH bicep on the R, better, in PT (10 visits)- improved    Plan:   Plan to see pt for one time a week for a few weeks for progression of HEP   Date: 11/11/2017 TX#: 2/8 Date:               TX#: 3/8  11/15/17 Date:               TX#: 4/8 11/17/17 Date:               TX#: 5/8 11/21/1 interlocking manipulations mult bouts grade 4-5    C spine PA glides grade 2-3   Mult bouts  STM to Upper trap bilaterally     15 mins  Manual PT: STm to the Upper traps.   Occipital release interlocking manipulation T4-T12 mult bouts   Grade 1-2 cervical s each  Retractions with ball at head 10 x  Retractions 10 x 5 sec holds    Ext over swissball 3 bouts 10 sec holds  Lat slides and stretch on bars 5 x 5 sec holds  MHP 10 mins  MHP c spine 10 mins  Tennis ball on the wall medial R scap 4 mins

## 2018-03-20 ENCOUNTER — OFFICE VISIT (OUTPATIENT)
Dept: PHYSICAL THERAPY | Age: 58
End: 2018-03-20
Attending: FAMILY MEDICINE
Payer: COMMERCIAL

## 2018-03-20 PROCEDURE — 97110 THERAPEUTIC EXERCISES: CPT

## 2018-03-20 PROCEDURE — 97140 MANUAL THERAPY 1/> REGIONS: CPT

## 2018-03-20 NOTE — PROGRESS NOTES
Dx:  Cervical spine pain / TMJ L dysfunction      Authorized # of Visits:   10       Next MD visit: none scheduled  Fall Risk: standard         Precautions: n/a             Subjective:   Neck is feeling better.  No pain in my neck- no catching-   Objective: Discharge with HEP  Date: 11/11/2017 TX#: 2/8 Date:               TX#: 3/8  11/15/17 Date:               TX#: 4/8 11/17/17 Date:               TX#: 5/8 11/21/17 Date:               TX#: 6/8 11/29/17 Date:               TX#: 7/8 12/7/17 Date: 2-3   Mult bouts  STM to Upper trap bilaterally     15 mins  Manual PT: STm to the Upper traps.   Occipital release interlocking manipulation T4-T12 mult bouts   Grade 1-2 cervical spine bilaterally     Distraction with pt in supine     25 mins total STM ce each     pec stretch 3 x 30 secs  Prone rows- pain    scap ext with retractions 10x 2    Re-assessment  10 mins    UT and levator stretches 3 x 30 secs each  Retractions with ball at head 10 x  Retractions 10 x 5 sec holds    Ext over swissball 3 bouts 10

## 2018-04-02 ENCOUNTER — LAB ENCOUNTER (OUTPATIENT)
Dept: LAB | Age: 58
End: 2018-04-02
Attending: FAMILY MEDICINE
Payer: COMMERCIAL

## 2018-04-02 ENCOUNTER — OFFICE VISIT (OUTPATIENT)
Dept: FAMILY MEDICINE CLINIC | Facility: CLINIC | Age: 58
End: 2018-04-02

## 2018-04-02 VITALS
WEIGHT: 149 LBS | SYSTOLIC BLOOD PRESSURE: 122 MMHG | HEART RATE: 88 BPM | DIASTOLIC BLOOD PRESSURE: 82 MMHG | HEIGHT: 66.5 IN | TEMPERATURE: 98 F | RESPIRATION RATE: 16 BRPM | BODY MASS INDEX: 23.67 KG/M2

## 2018-04-02 DIAGNOSIS — R52 BODY ACHES: ICD-10-CM

## 2018-04-02 DIAGNOSIS — Z79.899 MEDICATION DOSE CHANGED: ICD-10-CM

## 2018-04-02 DIAGNOSIS — Z78.9 HISTORY OF RECENT TRAVEL: ICD-10-CM

## 2018-04-02 DIAGNOSIS — R68.89 FLU-LIKE SYMPTOMS: ICD-10-CM

## 2018-04-02 DIAGNOSIS — R52 BODY ACHES: Primary | ICD-10-CM

## 2018-04-02 DIAGNOSIS — E06.3 CHRONIC LYMPHOCYTIC THYROIDITIS: ICD-10-CM

## 2018-04-02 DIAGNOSIS — R89.9 ABNORMAL LABORATORY TEST RESULT: ICD-10-CM

## 2018-04-02 DIAGNOSIS — E03.8 OTHER SPECIFIED HYPOTHYROIDISM: ICD-10-CM

## 2018-04-02 DIAGNOSIS — E03.9 HYPOTHYROIDISM, UNSPECIFIED TYPE: ICD-10-CM

## 2018-04-02 PROCEDURE — 86800 THYROGLOBULIN ANTIBODY: CPT

## 2018-04-02 PROCEDURE — 99214 OFFICE O/P EST MOD 30 MIN: CPT | Performed by: PHYSICIAN ASSISTANT

## 2018-04-02 PROCEDURE — 84439 ASSAY OF FREE THYROXINE: CPT

## 2018-04-02 PROCEDURE — 85025 COMPLETE CBC W/AUTO DIFF WBC: CPT

## 2018-04-02 PROCEDURE — 87798 DETECT AGENT NOS DNA AMP: CPT | Performed by: PHYSICIAN ASSISTANT

## 2018-04-02 PROCEDURE — 86789 WEST NILE VIRUS ANTIBODY: CPT

## 2018-04-02 PROCEDURE — 84481 FREE ASSAY (FT-3): CPT

## 2018-04-02 PROCEDURE — 36415 COLL VENOUS BLD VENIPUNCTURE: CPT

## 2018-04-02 PROCEDURE — 86140 C-REACTIVE PROTEIN: CPT

## 2018-04-02 PROCEDURE — 87502 INFLUENZA DNA AMP PROBE: CPT | Performed by: PHYSICIAN ASSISTANT

## 2018-04-02 PROCEDURE — 85652 RBC SED RATE AUTOMATED: CPT

## 2018-04-02 PROCEDURE — 86788 WEST NILE VIRUS AB IGM: CPT

## 2018-04-02 PROCEDURE — 84443 ASSAY THYROID STIM HORMONE: CPT

## 2018-04-02 NOTE — PROGRESS NOTES
HPI:   Gilberto Antoine is a 65year old female who presents for flu-like symptoms for  5  days. +nausea, +headache. Was diagnosed with the flu 1 month ago and states her symptoms currently feel similar.  It took her a couple of weeks to recover from this; (two) times daily as needed. Do not use longer than 14 days. Disp: 60 g Rfl: 0   Estradiol 0.1 MG/GM Vaginal Cream Actual dose is 0.25mg/gmPlace 2gm vaginally for 14 days, then use as needed. Per pharmacist at [facility]. Pharmacy.  Disp: 60 g Rfl: 0   Vitam apparent distress  SKIN: warm & dry, no rash, no insect bites on today's exam  EYES:PERRLA, conjunctiva are clear  HEENT: atraumatic, normocephalic, TMs effusion on left, no erythema, nares patent, posterior pharynx clear and normal, no sinus tenderness  N

## 2018-04-04 ENCOUNTER — PATIENT MESSAGE (OUTPATIENT)
Dept: FAMILY MEDICINE CLINIC | Facility: CLINIC | Age: 58
End: 2018-04-04

## 2018-04-05 NOTE — TELEPHONE ENCOUNTER
From: Tyler Ferreira  To: Sourav Arias  Sent: 4/4/2018 7:36 PM CDT  Subject: Test Results Question    Jenny Leary,   In looking at my thyroid test results, it looks like my TSH is coming into range, but my anti-thyroglobulin is way off.  Since that

## 2018-04-16 ENCOUNTER — PATIENT MESSAGE (OUTPATIENT)
Dept: FAMILY MEDICINE CLINIC | Facility: CLINIC | Age: 58
End: 2018-04-16

## 2018-04-17 NOTE — TELEPHONE ENCOUNTER
From: Sarah Ray  To: Sourav Sykes  Sent: 4/16/2018 9:22 PM CDT  Subject: Referral Request    Brant Callaway,   I came in earlier this year with shoulder pain and bicep tendonitis.  I was I the midst of PT for my neck, and you extended it for my sh

## 2018-05-08 ENCOUNTER — OFFICE VISIT (OUTPATIENT)
Dept: FAMILY MEDICINE CLINIC | Facility: CLINIC | Age: 58
End: 2018-05-08

## 2018-05-08 VITALS
WEIGHT: 151 LBS | BODY MASS INDEX: 23.98 KG/M2 | DIASTOLIC BLOOD PRESSURE: 68 MMHG | TEMPERATURE: 99 F | RESPIRATION RATE: 20 BRPM | HEIGHT: 66.5 IN | OXYGEN SATURATION: 98 % | HEART RATE: 72 BPM | SYSTOLIC BLOOD PRESSURE: 122 MMHG

## 2018-05-08 DIAGNOSIS — M75.21 BICEPS TENDINITIS OF RIGHT UPPER EXTREMITY: ICD-10-CM

## 2018-05-08 DIAGNOSIS — S46.811A STRAIN OF RIGHT DELTOID MUSCLE, INITIAL ENCOUNTER: ICD-10-CM

## 2018-05-08 DIAGNOSIS — Z00.00 ANNUAL PHYSICAL EXAM: ICD-10-CM

## 2018-05-08 DIAGNOSIS — Z12.31 SCREENING MAMMOGRAM, ENCOUNTER FOR: Primary | ICD-10-CM

## 2018-05-08 PROCEDURE — 99213 OFFICE O/P EST LOW 20 MIN: CPT | Performed by: FAMILY MEDICINE

## 2018-05-08 RX ORDER — CYCLOBENZAPRINE HCL 5 MG
5 TABLET ORAL NIGHTLY
Qty: 30 TABLET | Refills: 0 | Status: SHIPPED | OUTPATIENT
Start: 2018-05-08 | End: 2019-03-07 | Stop reason: ALTCHOICE

## 2018-05-08 NOTE — PROGRESS NOTES
HPI:   El Jones is a 65year old female here to discuss shoulder pain   Pt     s/p PT in November for neck pain   s/p oral steroids for bicep tendonitis   Pt still in pain         Current Outpatient Prescriptions:  Levothyroxine Sodium 100 MCG Oral • Hypertension Father    • Cancer Father      basal cell skin   • Thyroid Disorder Sister    • Hypertension Sister    • Thyroid Disorder Sister    • Cancer Sister      basal cell skin   • Breast Cancer Mother [de-identified]      Social History:   Smoking status: Nev initial encounter    - ORTHOPEDIC - INTERNAL  - Cyclobenzaprine HCl 5 MG Oral Tab; Take 1 tablet (5 mg total) by mouth nightly. Dispense: 30 tablet; Refill: 0    4. Annual physical exam    - FREE T4 (FREE THYROXINE);  Future  - ASSAY, THYROID STIM HORMONE;

## 2018-05-18 ENCOUNTER — HOSPITAL ENCOUNTER (EMERGENCY)
Age: 58
Discharge: HOME | End: 2018-05-18
Payer: COMMERCIAL

## 2018-05-18 VITALS
RESPIRATION RATE: 18 BRPM | BODY MASS INDEX: 23.78 KG/M2 | SYSTOLIC BLOOD PRESSURE: 147 MMHG | WEIGHT: 148 LBS | HEART RATE: 83 BPM | DIASTOLIC BLOOD PRESSURE: 73 MMHG | TEMPERATURE: 99 F | HEIGHT: 66 IN | OXYGEN SATURATION: 99 %

## 2018-05-18 DIAGNOSIS — R04.0 EPISTAXIS: Primary | ICD-10-CM

## 2018-05-18 PROCEDURE — 96374 THER/PROPH/DIAG INJ IV PUSH: CPT

## 2018-05-18 PROCEDURE — 99284 EMERGENCY DEPT VISIT MOD MDM: CPT

## 2018-05-18 PROCEDURE — 85730 THROMBOPLASTIN TIME PARTIAL: CPT | Performed by: PHYSICIAN ASSISTANT

## 2018-05-18 PROCEDURE — 96375 TX/PRO/DX INJ NEW DRUG ADDON: CPT

## 2018-05-18 PROCEDURE — 85610 PROTHROMBIN TIME: CPT | Performed by: PHYSICIAN ASSISTANT

## 2018-05-18 PROCEDURE — 80053 COMPREHEN METABOLIC PANEL: CPT | Performed by: PHYSICIAN ASSISTANT

## 2018-05-18 PROCEDURE — 85025 COMPLETE CBC W/AUTO DIFF WBC: CPT | Performed by: PHYSICIAN ASSISTANT

## 2018-05-18 PROCEDURE — 30903 CONTROL OF NOSEBLEED: CPT

## 2018-05-18 PROCEDURE — 96361 HYDRATE IV INFUSION ADD-ON: CPT

## 2018-05-18 RX ORDER — LORAZEPAM 2 MG/ML
0.5 INJECTION INTRAMUSCULAR ONCE
Status: COMPLETED | OUTPATIENT
Start: 2018-05-18 | End: 2018-05-18

## 2018-05-18 RX ORDER — ONDANSETRON 2 MG/ML
4 INJECTION INTRAMUSCULAR; INTRAVENOUS ONCE
Status: COMPLETED | OUTPATIENT
Start: 2018-05-18 | End: 2018-05-18

## 2018-05-18 RX ORDER — ALPRAZOLAM 0.5 MG/1
0.25 TABLET ORAL 3 TIMES DAILY PRN
Qty: 12 TABLET | Refills: 0 | Status: SHIPPED | OUTPATIENT
Start: 2018-05-18 | End: 2018-05-23

## 2018-05-18 RX ORDER — CEPHALEXIN 500 MG/1
500 CAPSULE ORAL ONCE
Status: COMPLETED | OUTPATIENT
Start: 2018-05-18 | End: 2018-05-18

## 2018-05-18 RX ORDER — CEPHALEXIN 500 MG/1
500 CAPSULE ORAL 4 TIMES DAILY
Qty: 28 CAPSULE | Refills: 0 | Status: SHIPPED | OUTPATIENT
Start: 2018-05-18 | End: 2018-05-25

## 2018-05-19 NOTE — ED PROVIDER NOTES
Patient Seen in: [facility] In [place]    History   Patient presents with:  Nose Bleed (nasopharyngeal)    Stated Complaint: NOSEBLEED    21-year-old  female without significant past medical history presents to the ER today w nasal deformity, septal deviation or nasal septal hematoma. Epistaxis is observed. No foreign bodies. There is a small excoriation at the R anterior inferior septum however this is not bleeding actively.   Patient blew her nose which started profuse blee file for this visit.     Follow-up:  Harpreet Davis MD  [address] [facility],[address] [phone]    In 3 days  Follow-up in 3 days for packing removal and reevaluation        Medications Prescribed:  Current Discharge Medication List

## 2018-05-19 NOTE — ED NOTES
When attempting to discharge patient, patient states that she is starting  To  Feel anxious and would like to know what she can do because she cannot go home with the packing in her nose. rosendo JEFFREY notified and in room talking with patient.

## 2018-05-19 NOTE — ED NOTES
At discharge patient states that she feels less anxious. Patient received written and verbal discharge  Instructions and rx. Denies any questions or concerns at time of discharge.  Patient ambulatory with steady gait,

## 2018-05-19 NOTE — ED NOTES
Patient medicated for being anxious. Patient states that she would also like to have a prescription for something that she can take at home. LOWELL notified.

## 2018-05-21 PROBLEM — R04.0 EPISTAXIS: Status: ACTIVE | Noted: 2018-05-21

## 2018-05-31 ENCOUNTER — HOSPITAL ENCOUNTER (OUTPATIENT)
Dept: ULTRASOUND IMAGING | Age: 58
Discharge: HOME | End: 2018-05-31
Attending: FAMILY MEDICINE
Payer: COMMERCIAL

## 2018-05-31 ENCOUNTER — HOSPITAL ENCOUNTER (OUTPATIENT)
Dept: MAMMOGRAPHY | Age: 58
Discharge: HOME | End: 2018-05-31
Attending: FAMILY MEDICINE
Payer: COMMERCIAL

## 2018-05-31 DIAGNOSIS — Z12.31 SCREENING MAMMOGRAM, ENCOUNTER FOR: ICD-10-CM

## 2018-05-31 DIAGNOSIS — J11.1 INFLUENZA: ICD-10-CM

## 2018-05-31 DIAGNOSIS — I88.1 LYMPHADENITIS, CHRONIC: ICD-10-CM

## 2018-05-31 PROCEDURE — 77067 SCR MAMMO BI INCL CAD: CPT | Performed by: FAMILY MEDICINE

## 2018-05-31 PROCEDURE — 76536 US EXAM OF HEAD AND NECK: CPT | Performed by: FAMILY MEDICINE

## 2018-05-31 PROCEDURE — 77063 BREAST TOMOSYNTHESIS BI: CPT | Performed by: FAMILY MEDICINE

## 2018-06-05 ENCOUNTER — HOSPITAL ENCOUNTER (OUTPATIENT)
Dept: MRI IMAGING | Age: 58
Discharge: HOME | End: 2018-06-05
Attending: ORTHOPAEDIC SURGERY
Payer: COMMERCIAL

## 2018-06-05 PROCEDURE — 73221 MRI JOINT UPR EXTREM W/O DYE: CPT | Performed by: ORTHOPAEDIC SURGERY

## 2018-06-08 PROBLEM — M75.01 ADHESIVE CAPSULITIS OF RIGHT SHOULDER: Status: ACTIVE | Noted: 2018-06-08

## 2018-06-21 ENCOUNTER — OFFICE VISIT (OUTPATIENT)
Dept: PHYSICAL THERAPY | Age: 58
End: 2018-06-21
Attending: ORTHOPAEDIC SURGERY
Payer: COMMERCIAL

## 2018-06-21 DIAGNOSIS — M75.01 ADHESIVE CAPSULITIS OF RIGHT SHOULDER: ICD-10-CM

## 2018-06-21 PROCEDURE — 97110 THERAPEUTIC EXERCISES: CPT

## 2018-06-21 PROCEDURE — 97161 PT EVAL LOW COMPLEX 20 MIN: CPT

## 2018-06-21 NOTE — PROGRESS NOTES
UPPER EXTREMITY EVALUATION:   Referring Physician: Dr. Bekah Rice  Diagnosis:  R shoulder adhesive capsulitis  Date of Service: 6/21/2018     PATIENT SUMMARY   Jerri Allen is a 65year old y/o female who presents to therapy today with complaints of R becca R  4/5 R  Not able to test MT or LE due to restriction of shoulder mobility R  Rhomboids 3/5 R     Special tests:  NA today     Today’s Treatment and Response: Patient education provided on  Sleeper stretch on the R. Wall slides, ABDIEL with cane for flex. Potential:good    FOTO: 56/100    Patient/Family/Caregiver was advised of these findings, precautions, and treatment options and has agreed to actively participate in planning and for this course of care.     Thank you for your referral. Please co-sign or s

## 2018-06-22 ENCOUNTER — OFFICE VISIT (OUTPATIENT)
Dept: PHYSICAL THERAPY | Age: 58
End: 2018-06-22
Attending: ORTHOPAEDIC SURGERY
Payer: COMMERCIAL

## 2018-06-22 PROCEDURE — 97110 THERAPEUTIC EXERCISES: CPT

## 2018-06-22 PROCEDURE — 97140 MANUAL THERAPY 1/> REGIONS: CPT

## 2018-06-22 NOTE — PROGRESS NOTES
Dx:  R shoulder adhesive capsulitis     Authorized # of Visits:  10         Next MD visit: none scheduled  Fall Risk: standard         Precautions: n/a             Subjective:  Feeling better today. Not as sore in the shoulder.  + post shoulder with palpati

## 2018-06-26 ENCOUNTER — APPOINTMENT (OUTPATIENT)
Dept: PHYSICAL THERAPY | Age: 58
End: 2018-06-26
Attending: ORTHOPAEDIC SURGERY
Payer: COMMERCIAL

## 2018-06-29 ENCOUNTER — OFFICE VISIT (OUTPATIENT)
Dept: PHYSICAL THERAPY | Age: 58
End: 2018-06-29
Attending: FAMILY MEDICINE
Payer: COMMERCIAL

## 2018-06-29 PROCEDURE — 97140 MANUAL THERAPY 1/> REGIONS: CPT

## 2018-06-29 PROCEDURE — 97110 THERAPEUTIC EXERCISES: CPT

## 2018-07-03 ENCOUNTER — OFFICE VISIT (OUTPATIENT)
Dept: PHYSICAL THERAPY | Age: 58
End: 2018-07-03
Attending: ORTHOPAEDIC SURGERY
Payer: COMMERCIAL

## 2018-07-03 PROCEDURE — 97140 MANUAL THERAPY 1/> REGIONS: CPT

## 2018-07-03 PROCEDURE — 97110 THERAPEUTIC EXERCISES: CPT

## 2018-07-03 NOTE — PROGRESS NOTES
Dx:  R shoulder adhesive capsulitis     Authorized # of Visits:  10         Next MD visit: none scheduled  Fall Risk: standard         Precautions: n/a             Subjective:   Did yard work yesterday and a little sore in the upper thoracic spine today. mins                  Skilled Services:  Manual PT and skilled exercises    Charges:  MT 1 EX 2      Total Timed Treatment:  45  min  Total Treatment Time:  45  min

## 2018-07-05 ENCOUNTER — OFFICE VISIT (OUTPATIENT)
Dept: PHYSICAL THERAPY | Age: 58
End: 2018-07-05
Attending: ORTHOPAEDIC SURGERY
Payer: COMMERCIAL

## 2018-07-05 PROCEDURE — 97140 MANUAL THERAPY 1/> REGIONS: CPT

## 2018-07-05 PROCEDURE — 97110 THERAPEUTIC EXERCISES: CPT

## 2018-07-05 NOTE — PROGRESS NOTES
Dx:  R shoulder adhesive capsulitis     Authorized # of Visits:  10         Next MD visit: none scheduled  Fall Risk: standard         Precautions: n/a             Subjective:   Feeling better over all . Still compliant with HEP   Objective: .  IR to T 12 all planes 10 x each  Standing flex/scap no weights 10 x each     Wall push ups 10 x     sharman LV 1 10 x each side          CP 10 mins        Attempted ER in standing- pain  Flex to 90 degs  10 x  Ice R shoulder 10 mins in sitting        Rows with red ba

## 2018-07-11 ENCOUNTER — OFFICE VISIT (OUTPATIENT)
Dept: PHYSICAL THERAPY | Age: 58
End: 2018-07-11
Attending: FAMILY MEDICINE
Payer: COMMERCIAL

## 2018-07-11 PROCEDURE — 97110 THERAPEUTIC EXERCISES: CPT

## 2018-07-11 PROCEDURE — 97140 MANUAL THERAPY 1/> REGIONS: CPT

## 2018-07-11 NOTE — PROGRESS NOTES
Dx:  R shoulder adhesive capsulitis     Authorized # of Visits:  10         Next MD visit: none scheduled  Fall Risk: standard         Precautions: n/a             Subjective:   Feeling better over all . Still compliant with HEP. sleeping better.     Pedro Chapman each  Standing flex/scap no weights 10 x each     Wall push ups 10 x     sharman LV 1 10 x each side  Row in prone 2# 10 x    Prone post/ant mult bouts 30 grade 2-3  PROM all planes     15 mins total         CP 10 mins  Tennis ball behind the back on th up

## 2018-07-13 ENCOUNTER — OFFICE VISIT (OUTPATIENT)
Dept: PHYSICAL THERAPY | Age: 58
End: 2018-07-13
Attending: ORTHOPAEDIC SURGERY
Payer: COMMERCIAL

## 2018-07-13 PROCEDURE — 97110 THERAPEUTIC EXERCISES: CPT

## 2018-07-13 PROCEDURE — 97140 MANUAL THERAPY 1/> REGIONS: CPT

## 2018-07-13 NOTE — PROGRESS NOTES
Dx:  R shoulder adhesive capsulitis     Authorized # of Visits:  10         Next MD visit: none scheduled  Fall Risk: standard         Precautions: n/a           PROGRESS NOTE- PLEASE ISSUE A NEW REFERRAL FOR [facility] TO CONTINUE WITH THERAPY.  1X FOR 4 MOR with comprehensive HEP to maintain progress achieved in PT (10 visits)- met     Plan cont with PT for R shoulder ROM and strengthening. Pt would benefit from therapy to increase strength in the rotator cuff.  1x week for 4 more weeks   Date: 6/22/2018 TX#: in standing- pain  Flex to 90 degs  10 x  Ice R shoulder 10 mins in sitting   Standing flex/ext red band 10 x each   adduction red band 10 x      Rows with red band 10 x 2    CP in sitting 10 mins  ER/IR with band red 10 x each   Cane for ext 10 x

## 2018-07-20 ENCOUNTER — APPOINTMENT (OUTPATIENT)
Dept: PHYSICAL THERAPY | Age: 58
End: 2018-07-20
Attending: ORTHOPAEDIC SURGERY
Payer: COMMERCIAL

## 2018-07-20 PROCEDURE — 97110 THERAPEUTIC EXERCISES: CPT

## 2018-07-20 PROCEDURE — 97140 MANUAL THERAPY 1/> REGIONS: CPT

## 2018-07-21 NOTE — PROGRESS NOTES
Dx:  R shoulder adhesive capsulitis     Authorized # of Visits:  10         Next MD visit: none scheduled  Fall Risk: standard         Precautions: n/a             Subjective:    I have been feeling really good, however I did maybe over do it going to a cl TX#: 4/10  7/3/18 Date:               TX#: 5/10  7/5/18 Date:               TX#: 6/10  7/11/18  7/8  7/13/18 Date:               TX#: 8/8 7/21/18   UBE 2 mins fwd  2 mins bkwd UBE to warm up fwd and bkwd  4 total  UBE to warm up 4 mins UBE 2 mins fwd  2 m Standing flex/ext red band 10 x each   adduction red band 10 x      Rows with red band 10 x 2    CP in sitting 10 mins  ER/IR with band red 10 x each   Cane for ext 10 x          CP in sitting  10 mins      CP 10 mins                  Skilled Services:

## 2018-07-25 ENCOUNTER — OFFICE VISIT (OUTPATIENT)
Dept: PHYSICAL THERAPY | Age: 58
End: 2018-07-25
Attending: ORTHOPAEDIC SURGERY
Payer: COMMERCIAL

## 2018-07-25 PROCEDURE — 97140 MANUAL THERAPY 1/> REGIONS: CPT

## 2018-07-25 PROCEDURE — 97110 THERAPEUTIC EXERCISES: CPT

## 2018-07-25 NOTE — PROGRESS NOTES
Dx:  R shoulder adhesive capsulitis     Authorized # of Visits:  10         Next MD visit: none scheduled  Fall Risk: standard         Precautions: n/a             Subjective:    Sore in the front of my shoulder. Objective: .   IR behind the back to T12 l TX#: 8/8 7/21/18 7/25/18 1/4   UBE 2 mins fwd  2 mins bkwd UBE to warm up fwd and bkwd  4 total  UBE to warm up 4 mins UBE 2 mins fwd  2 mins bkwd UBE 2 fwd  2 bkwd UBe for warm up 4 mins total   Fwd/bkwd UBE for warm up both directions UBE B Taped tendon with KT 5 mins    Attempted ER in standing- pain  Flex to 90 degs  10 x  Ice R shoulder 10 mins in sitting   Standing flex/ext red band 10 x each   adduction red band 10 x       Rows with red band 10 x 2    CP in sitting 10 mins  ER/IR with ba

## 2018-07-27 ENCOUNTER — OFFICE VISIT (OUTPATIENT)
Dept: PHYSICAL THERAPY | Age: 58
End: 2018-07-27
Attending: ORTHOPAEDIC SURGERY
Payer: COMMERCIAL

## 2018-07-27 PROCEDURE — 97140 MANUAL THERAPY 1/> REGIONS: CPT

## 2018-07-27 PROCEDURE — 97110 THERAPEUTIC EXERCISES: CPT

## 2018-07-27 RX ORDER — LEVOTHYROXINE SODIUM 0.1 MG/1
TABLET ORAL
Qty: 90 TABLET | Refills: 1 | Status: SHIPPED | OUTPATIENT
Start: 2018-07-27 | End: 2018-12-26

## 2018-07-27 NOTE — PROGRESS NOTES
Dx:  R shoulder adhesive capsulitis     Authorized # of Visits:  10         Next MD visit: none scheduled  Fall Risk: standard         Precautions: n/a             Subjective:    Sore in the front of my shoulder. I did golf and did ok. Objective: .   IR 6/22/2018 TX#: 2/10 Date:        6/29/18       TX#: 3/10   Date:               TX#: 4/10  7/3/18 Date:               TX#: 5/10  7/5/18 Date:               TX#: 6/10  7/11/18  7/8  7/13/18 Date:               TX#: 8/8 7/21/18 7/25/18 7/27 1/4 Rows red band 10 x     Static ER red band 10 x    flex with 2# 10 x   scap 10 x 2# pain      CP 10 mins  Tennis ball behind the back on th upper right.  5 mins ER with 2 # 10 x 2 in side lying Game ready 15 mins R shoulder  Taped tendon with KT 5 mins    At

## 2018-08-06 ENCOUNTER — APPOINTMENT (OUTPATIENT)
Dept: GENERAL RADIOLOGY | Age: 58
End: 2018-08-06
Attending: EMERGENCY MEDICINE
Payer: COMMERCIAL

## 2018-08-06 ENCOUNTER — HOSPITAL ENCOUNTER (EMERGENCY)
Age: 58
Discharge: HOME | End: 2018-08-06
Attending: EMERGENCY MEDICINE
Payer: COMMERCIAL

## 2018-08-06 VITALS
HEART RATE: 89 BPM | TEMPERATURE: 99 F | SYSTOLIC BLOOD PRESSURE: 138 MMHG | HEIGHT: 66 IN | RESPIRATION RATE: 18 BRPM | WEIGHT: 148 LBS | BODY MASS INDEX: 23.78 KG/M2 | DIASTOLIC BLOOD PRESSURE: 76 MMHG | OXYGEN SATURATION: 96 %

## 2018-08-06 DIAGNOSIS — R06.00 DYSPNEA, UNSPECIFIED TYPE: Primary | ICD-10-CM

## 2018-08-06 LAB
ALBUMIN SERPL-MCNC: 3.8 G/DL (ref 3.5–4.8)
ALBUMIN/GLOB SERPL: 1.3 {RATIO} (ref 1–2)
ALP LIVER SERPL-CCNC: 100 U/L (ref 46–118)
ALT SERPL-CCNC: 29 U/L (ref 14–54)
ANION GAP SERPL CALC-SCNC: 8 MMOL/L (ref 0–18)
AST SERPL-CCNC: 22 U/L (ref 15–41)
BASOPHILS # BLD AUTO: 0.01 X10(3) UL (ref 0–0.1)
BASOPHILS NFR BLD AUTO: 0.2 %
BILIRUB SERPL-MCNC: 0.8 MG/DL (ref 0.1–2)
BUN BLD-MCNC: 20 MG/DL (ref 8–20)
BUN/CREAT SERPL: 19.6 (ref 10–20)
CALCIUM BLD-MCNC: 8.7 MG/DL (ref 8.3–10.3)
CHLORIDE SERPL-SCNC: 106 MMOL/L (ref 101–111)
CO2 SERPL-SCNC: 26 MMOL/L (ref 22–32)
CREAT BLD-MCNC: 1.02 MG/DL (ref 0.55–1.02)
D-DIMER: <0.27 UG/ML FEU (ref 0–0.49)
EOSINOPHIL # BLD AUTO: 0.09 X10(3) UL (ref 0–0.3)
EOSINOPHIL NFR BLD AUTO: 1.5 %
ERYTHROCYTE [DISTWIDTH] IN BLOOD BY AUTOMATED COUNT: 12.2 % (ref 11.5–16)
GLOBULIN PLAS-MCNC: 3 G/DL (ref 2.5–3.7)
GLUCOSE BLD-MCNC: 102 MG/DL (ref 70–99)
HCT VFR BLD AUTO: 36.1 % (ref 34–50)
HGB BLD-MCNC: 12.5 G/DL (ref 12–16)
IMMATURE GRANULOCYTE COUNT: 0.02 X10(3) UL (ref 0–1)
IMMATURE GRANULOCYTE RATIO %: 0.3 %
LYMPHOCYTES # BLD AUTO: 1.42 X10(3) UL (ref 0.9–4)
LYMPHOCYTES NFR BLD AUTO: 24 %
M PROTEIN MFR SERPL ELPH: 6.8 G/DL (ref 6.1–8.3)
MCH RBC QN AUTO: 31.9 PG (ref 27–33.2)
MCHC RBC AUTO-ENTMCNC: 34.6 G/DL (ref 31–37)
MCV RBC AUTO: 92.1 FL (ref 81–100)
MONOCYTES # BLD AUTO: 0.47 X10(3) UL (ref 0.1–1)
MONOCYTES NFR BLD AUTO: 7.9 %
NEUTROPHIL ABS PRELIM: 3.91 X10 (3) UL (ref 1.3–6.7)
NEUTROPHILS # BLD AUTO: 3.91 X10(3) UL (ref 1.3–6.7)
NEUTROPHILS NFR BLD AUTO: 66.1 %
OSMOLALITY SERPL CALC.SUM OF ELEC: 293 MOSM/KG (ref 275–295)
PLATELET # BLD AUTO: 195 10(3)UL (ref 150–450)
POTASSIUM SERPL-SCNC: 3.5 MMOL/L (ref 3.6–5.1)
RBC # BLD AUTO: 3.92 X10(6)UL (ref 3.8–5.1)
RED CELL DISTRIBUTION WIDTH-SD: 40.2 FL (ref 35.1–46.3)
SODIUM SERPL-SCNC: 140 MMOL/L (ref 136–144)
TROPONIN I SERPL-MCNC: <0.046 NG/ML (ref ?–0.05)
WBC # BLD AUTO: 5.9 X10(3) UL (ref 4–13)

## 2018-08-06 PROCEDURE — 99285 EMERGENCY DEPT VISIT HI MDM: CPT

## 2018-08-06 PROCEDURE — 85025 COMPLETE CBC W/AUTO DIFF WBC: CPT | Performed by: EMERGENCY MEDICINE

## 2018-08-06 PROCEDURE — 80053 COMPREHEN METABOLIC PANEL: CPT | Performed by: EMERGENCY MEDICINE

## 2018-08-06 PROCEDURE — 36415 COLL VENOUS BLD VENIPUNCTURE: CPT

## 2018-08-06 PROCEDURE — 85378 FIBRIN DEGRADE SEMIQUANT: CPT | Performed by: EMERGENCY MEDICINE

## 2018-08-06 PROCEDURE — 71046 X-RAY EXAM CHEST 2 VIEWS: CPT | Performed by: EMERGENCY MEDICINE

## 2018-08-06 PROCEDURE — 93010 ELECTROCARDIOGRAM REPORT: CPT

## 2018-08-06 PROCEDURE — 93005 ELECTROCARDIOGRAM TRACING: CPT

## 2018-08-06 PROCEDURE — 84484 ASSAY OF TROPONIN QUANT: CPT | Performed by: EMERGENCY MEDICINE

## 2018-08-06 RX ORDER — ALBUTEROL SULFATE 90 UG/1
2 AEROSOL, METERED RESPIRATORY (INHALATION) EVERY 4 HOURS PRN
Qty: 1 INHALER | Refills: 0 | Status: SHIPPED | OUTPATIENT
Start: 2018-08-06 | End: 2018-09-05

## 2018-08-06 NOTE — ED PROVIDER NOTES
Patient Seen in: [facility] In [place]    History   Patient presents with:  Cough/URI  Dyspnea TAJ SOB (respiratory)    Stated Complaint: Cough, Shortness of Breath    HPI    This is a 52-year-old female who recently went to [place]. distress at this present time. The patient is in no respiratory distress. The patient is not septic or toxic    HEENT: Atraumatic, conjunctiva are not pale. There is no icterus. Oral mucosa Is wet. No facial trauma. The neck is supple.     LUNGS: Clear IV was started, blood was drawn. The EKG shows normal sinus rhythm. There is findings of a left bundle. No acute ST elevations. The rest of the EKG including rate rhythm axis and intervals I agree with the EKG report .  The rate is 87    Xr Chest Pa + did review which did not show anything significant. She feels comfortable going home. I discussed with her to use albuterol she is taught how to use a spacer by our staff here.   She should follow-up with her own primary care physician for outpatient stre

## 2018-08-07 LAB
ATRIAL RATE: 87 BPM
P AXIS: 72 DEGREES
P-R INTERVAL: 142 MS
Q-T INTERVAL: 408 MS
QRS DURATION: 130 MS
QTC CALCULATION (BEZET): 490 MS
R AXIS: -17 DEGREES
T AXIS: 114 DEGREES
VENTRICULAR RATE: 87 BPM

## 2018-08-08 ENCOUNTER — APPOINTMENT (OUTPATIENT)
Dept: PHYSICAL THERAPY | Age: 58
End: 2018-08-08
Attending: ORTHOPAEDIC SURGERY
Payer: COMMERCIAL

## 2018-08-08 ENCOUNTER — OFFICE VISIT (OUTPATIENT)
Dept: FAMILY MEDICINE CLINIC | Facility: CLINIC | Age: 58
End: 2018-08-08

## 2018-08-08 ENCOUNTER — TELEPHONE (OUTPATIENT)
Dept: FAMILY MEDICINE CLINIC | Facility: CLINIC | Age: 58
End: 2018-08-08

## 2018-08-08 VITALS
DIASTOLIC BLOOD PRESSURE: 80 MMHG | HEIGHT: 66 IN | BODY MASS INDEX: 24.11 KG/M2 | OXYGEN SATURATION: 99 % | RESPIRATION RATE: 20 BRPM | HEART RATE: 78 BPM | TEMPERATURE: 99 F | WEIGHT: 150 LBS | SYSTOLIC BLOOD PRESSURE: 122 MMHG

## 2018-08-08 DIAGNOSIS — L57.0 ACTINIC KERATOSIS: ICD-10-CM

## 2018-08-08 DIAGNOSIS — J45.909 REACTIVE AIRWAY DISEASE WITHOUT COMPLICATION, UNSPECIFIED ASTHMA SEVERITY, UNSPECIFIED WHETHER PERSISTENT: ICD-10-CM

## 2018-08-08 DIAGNOSIS — Z01.419 WELL WOMAN EXAM WITH ROUTINE GYNECOLOGICAL EXAM: Primary | ICD-10-CM

## 2018-08-08 DIAGNOSIS — Z12.31 ENCOUNTER FOR SCREENING MAMMOGRAM FOR HIGH-RISK PATIENT: ICD-10-CM

## 2018-08-08 DIAGNOSIS — Z00.00 ANNUAL PHYSICAL EXAM: ICD-10-CM

## 2018-08-08 DIAGNOSIS — Z13.820 SCREENING FOR OSTEOPOROSIS: ICD-10-CM

## 2018-08-08 PROCEDURE — 99396 PREV VISIT EST AGE 40-64: CPT | Performed by: FAMILY MEDICINE

## 2018-08-08 RX ORDER — ONDANSETRON 8 MG/1
8 TABLET, ORALLY DISINTEGRATING ORAL EVERY 8 HOURS PRN
Qty: 8 TABLET | Refills: 3 | Status: SHIPPED | OUTPATIENT
Start: 2018-08-08 | End: 2019-05-06

## 2018-08-08 RX ORDER — PREDNISONE 10 MG/1
TABLET ORAL
Qty: 25 TABLET | Refills: 0 | Status: SHIPPED | OUTPATIENT
Start: 2018-08-08 | End: 2019-02-07 | Stop reason: ALTCHOICE

## 2018-08-08 NOTE — TELEPHONE ENCOUNTER
Per LE: Prednisone 10mg 5 tablets daily for 5 days. RX sent. LMOM for pt with this information. Task completed.

## 2018-08-08 NOTE — PROGRESS NOTES
HPI:   Ragini Wade is a 65year old female who presents for a complete physical exam.     Wt Readings from Last 6 Encounters:  08/08/18 : 150 lb  08/06/18 : 148 lb  05/23/18 : 148 lb  05/18/18 : 148 lb  05/08/18 : 151 lb  04/02/18 : 149 lb    Body mas CHONDROITIN COMPLX OR Take  by mouth. Disp:  Rfl:    Pyridoxine HCl (VITAMIN B-6 OR) Take  by mouth. Take one tablet daily Disp:  Rfl:    Multiple Vitamins-Minerals (MULTIVITAMIN OR) Take  by mouth.  Disp:  Rfl:    Cholecalciferol (VITAMIN D) 1000 UNITS Ora Wt 150 lb   SpO2 99%   BMI 24.21 kg/m²   Body mass index is 24.21 kg/m².    GENERAL: alert and oriented X 3, well developed, well nourished,in no apparent distress  CARDIO: RRR without murmur  LUNGS: clear to auscultation  NECK: supple,no adenopathy,no thyr

## 2018-08-22 ENCOUNTER — OFFICE VISIT (OUTPATIENT)
Dept: PHYSICAL THERAPY | Age: 58
End: 2018-08-22
Attending: ORTHOPAEDIC SURGERY
Payer: COMMERCIAL

## 2018-08-22 PROCEDURE — 97016 VASOPNEUMATIC DEVICE THERAPY: CPT

## 2018-08-22 PROCEDURE — 97140 MANUAL THERAPY 1/> REGIONS: CPT

## 2018-08-22 PROCEDURE — 97110 THERAPEUTIC EXERCISES: CPT

## 2018-08-22 NOTE — PROGRESS NOTES
Dx:  R shoulder adhesive capsulitis     Authorized # of Visits:  10         Next MD visit: none scheduled  Fall Risk: standard         Precautions: n/a             Subjective:    I was doing great and I went to fall on vacation and my  grabbed me by independent and compliant with comprehensive HEP to maintain progress achieved in PT (10 visits)- met     Plan  Cont with PT- pt seems to have regressed in her progress following the incident on vacation.    Date: 6/22/2018 TX#: 2/10 Date:        6/29/18 x each  Standing flex/scap no weights 10 x each     Wall push ups 10 x     sharman LV 1 10 x each side  Row in prone 2# 10 x    Prone post/ant mult bouts 30 grade 2-3  PROM all planes     15 mins total  PROM all planes:  STM LHB tendon  Ant to pos GHJ mobs

## 2018-08-31 ENCOUNTER — APPOINTMENT (OUTPATIENT)
Dept: PHYSICAL THERAPY | Age: 58
End: 2018-08-31
Attending: FAMILY MEDICINE
Payer: COMMERCIAL

## 2018-08-31 PROCEDURE — 97140 MANUAL THERAPY 1/> REGIONS: CPT

## 2018-08-31 PROCEDURE — 97110 THERAPEUTIC EXERCISES: CPT

## 2018-09-03 NOTE — PROGRESS NOTES
Dx:  R shoulder adhesive capsulitis     Authorized # of Visits:  10         Next MD visit: none scheduled  Fall Risk: standard         Precautions: n/a             Subjective:   Planning on calling the MD for a cortisone injection.  Pt is still having pain wait to hear from PT.    Date: 6/22/2018 TX#: 2/10 Date:        6/29/18       TX#: 3/10   Date:               TX#: 4/10  7/3/18 Date:               TX#: 5/10  7/5/18 Date:               TX#: 6/10  7/11/18  7/8  7/13/18 Date:               TX#: 8/8 7/21/18 2-3  PROM all planes     15 mins total  PROM all planes:  STM LHB tendon  Ant to pos GHJ mobs grade 2-3 mult bouts  T4-T12 interlocking mobs mult bouts  Grade 4-5    15 mins  Cane ext ext and supine flexion 10 x each  Rows red band 10 x     Static ER red b

## 2018-09-15 ENCOUNTER — HOSPITAL ENCOUNTER (OUTPATIENT)
Dept: BONE DENSITY | Age: 58
Discharge: HOME | End: 2018-09-15
Payer: COMMERCIAL

## 2018-09-15 ENCOUNTER — LAB ENCOUNTER (OUTPATIENT)
Dept: LAB | Age: 58
End: 2018-09-15
Attending: FAMILY MEDICINE
Payer: COMMERCIAL

## 2018-09-15 DIAGNOSIS — Z13.820 SCREENING FOR OSTEOPOROSIS: ICD-10-CM

## 2018-09-15 DIAGNOSIS — Z00.00 ANNUAL PHYSICAL EXAM: ICD-10-CM

## 2018-09-15 LAB
ALBUMIN SERPL-MCNC: 4 G/DL (ref 3.5–4.8)
ALBUMIN/GLOB SERPL: 1.3 {RATIO} (ref 1–2)
ALP LIVER SERPL-CCNC: 95 U/L (ref 46–118)
ALT SERPL-CCNC: 33 U/L (ref 14–54)
ANION GAP SERPL CALC-SCNC: 6 MMOL/L (ref 0–18)
AST SERPL-CCNC: 22 U/L (ref 15–41)
BASOPHILS # BLD AUTO: 0.02 X10(3) UL (ref 0–0.1)
BASOPHILS NFR BLD AUTO: 0.5 %
BILIRUB SERPL-MCNC: 1 MG/DL (ref 0.1–2)
BUN BLD-MCNC: 14 MG/DL (ref 8–20)
BUN/CREAT SERPL: 15.4 (ref 10–20)
CALCIUM BLD-MCNC: 9.2 MG/DL (ref 8.3–10.3)
CHLORIDE SERPL-SCNC: 107 MMOL/L (ref 101–111)
CHOLEST SMN-MCNC: 140 MG/DL (ref ?–200)
CO2 SERPL-SCNC: 27 MMOL/L (ref 22–32)
CREAT BLD-MCNC: 0.91 MG/DL (ref 0.55–1.02)
EOSINOPHIL # BLD AUTO: 0.18 X10(3) UL (ref 0–0.3)
EOSINOPHIL NFR BLD AUTO: 4.3 %
ERYTHROCYTE [DISTWIDTH] IN BLOOD BY AUTOMATED COUNT: 12.1 % (ref 11.5–16)
GLOBULIN PLAS-MCNC: 3 G/DL (ref 2.5–4)
GLUCOSE BLD-MCNC: 87 MG/DL (ref 70–99)
HAV AB SERPL IA-ACNC: 807 PG/ML (ref 193–986)
HCT VFR BLD AUTO: 41.2 % (ref 34–50)
HDLC SERPL-MCNC: 59 MG/DL (ref 40–59)
HGB BLD-MCNC: 13.7 G/DL (ref 12–16)
IMMATURE GRANULOCYTE COUNT: 0 X10(3) UL (ref 0–1)
IMMATURE GRANULOCYTE RATIO %: 0 %
LDLC SERPL CALC-MCNC: 63 MG/DL (ref ?–100)
LYMPHOCYTES # BLD AUTO: 1.56 X10(3) UL (ref 0.9–4)
LYMPHOCYTES NFR BLD AUTO: 37.2 %
M PROTEIN MFR SERPL ELPH: 7 G/DL (ref 6.1–8.3)
MCH RBC QN AUTO: 32.1 PG (ref 27–33.2)
MCHC RBC AUTO-ENTMCNC: 33.3 G/DL (ref 31–37)
MCV RBC AUTO: 96.5 FL (ref 81–100)
MONOCYTES # BLD AUTO: 0.36 X10(3) UL (ref 0.1–1)
MONOCYTES NFR BLD AUTO: 8.6 %
NEUTROPHIL ABS PRELIM: 2.07 X10 (3) UL (ref 1.3–6.7)
NEUTROPHILS # BLD AUTO: 2.07 X10(3) UL (ref 1.3–6.7)
NEUTROPHILS NFR BLD AUTO: 49.4 %
NONHDLC SERPL-MCNC: 81 MG/DL (ref ?–130)
OSMOLALITY SERPL CALC.SUM OF ELEC: 290 MOSM/KG (ref 275–295)
PLATELET # BLD AUTO: 215 10(3)UL (ref 150–450)
POTASSIUM SERPL-SCNC: 4.3 MMOL/L (ref 3.6–5.1)
RBC # BLD AUTO: 4.27 X10(6)UL (ref 3.8–5.1)
RED CELL DISTRIBUTION WIDTH-SD: 42.5 FL (ref 35.1–46.3)
SODIUM SERPL-SCNC: 140 MMOL/L (ref 136–144)
T4 FREE SERPL-MCNC: 1.4 NG/DL (ref 0.9–1.8)
TRIGL SERPL-MCNC: 91 MG/DL (ref 30–149)
TSI SER-ACNC: 0.57 MIU/ML (ref 0.35–5.5)
VIT D+METAB SERPL-MCNC: 38.9 NG/ML (ref 30–100)
VLDLC SERPL CALC-MCNC: 18 MG/DL (ref 0–30)
WBC # BLD AUTO: 4.2 X10(3) UL (ref 4–13)

## 2018-09-15 PROCEDURE — 80061 LIPID PANEL: CPT

## 2018-09-15 PROCEDURE — 80053 COMPREHEN METABOLIC PANEL: CPT

## 2018-09-15 PROCEDURE — 85025 COMPLETE CBC W/AUTO DIFF WBC: CPT

## 2018-09-15 PROCEDURE — 82607 VITAMIN B-12: CPT

## 2018-09-15 PROCEDURE — 84443 ASSAY THYROID STIM HORMONE: CPT

## 2018-09-15 PROCEDURE — 82306 VITAMIN D 25 HYDROXY: CPT

## 2018-09-15 PROCEDURE — 77080 DXA BONE DENSITY AXIAL: CPT | Performed by: FAMILY MEDICINE

## 2018-09-15 PROCEDURE — 36415 COLL VENOUS BLD VENIPUNCTURE: CPT

## 2018-09-15 PROCEDURE — 84439 ASSAY OF FREE THYROXINE: CPT

## 2018-11-06 ENCOUNTER — IMMUNIZATION (OUTPATIENT)
Dept: FAMILY MEDICINE CLINIC | Facility: CLINIC | Age: 58
End: 2018-11-06

## 2018-11-06 ENCOUNTER — MED REC SCAN ONLY (OUTPATIENT)
Dept: FAMILY MEDICINE CLINIC | Facility: CLINIC | Age: 58
End: 2018-11-06

## 2018-11-06 DIAGNOSIS — Z23 NEED FOR VACCINATION: ICD-10-CM

## 2018-11-06 PROCEDURE — 90686 IIV4 VACC NO PRSV 0.5 ML IM: CPT | Performed by: FAMILY MEDICINE

## 2018-11-06 PROCEDURE — 90471 IMMUNIZATION ADMIN: CPT | Performed by: FAMILY MEDICINE

## 2018-12-04 ENCOUNTER — OFFICE VISIT (OUTPATIENT)
Dept: FAMILY MEDICINE CLINIC | Facility: CLINIC | Age: 58
End: 2018-12-04

## 2018-12-04 VITALS
HEIGHT: 66 IN | TEMPERATURE: 98 F | WEIGHT: 154 LBS | RESPIRATION RATE: 16 BRPM | DIASTOLIC BLOOD PRESSURE: 84 MMHG | BODY MASS INDEX: 24.75 KG/M2 | OXYGEN SATURATION: 98 % | HEART RATE: 88 BPM | SYSTOLIC BLOOD PRESSURE: 122 MMHG

## 2018-12-04 DIAGNOSIS — F41.9 ANXIETY: ICD-10-CM

## 2018-12-04 DIAGNOSIS — R53.83 OTHER FATIGUE: Primary | ICD-10-CM

## 2018-12-04 DIAGNOSIS — R25.3 MUSCLE TWITCH: ICD-10-CM

## 2018-12-04 DIAGNOSIS — K21.00 GASTROESOPHAGEAL REFLUX DISEASE WITH ESOPHAGITIS: ICD-10-CM

## 2018-12-04 PROCEDURE — 99214 OFFICE O/P EST MOD 30 MIN: CPT | Performed by: FAMILY MEDICINE

## 2018-12-04 RX ORDER — OMEPRAZOLE 40 MG/1
40 CAPSULE, DELAYED RELEASE ORAL DAILY
Qty: 30 CAPSULE | Refills: 2 | Status: SHIPPED | OUTPATIENT
Start: 2018-12-04 | End: 2019-05-06 | Stop reason: ALTCHOICE

## 2018-12-04 RX ORDER — MONTELUKAST SODIUM 10 MG/1
TABLET ORAL
Refills: 4 | COMMUNITY
Start: 2018-08-29 | End: 2019-02-07 | Stop reason: ALTCHOICE

## 2018-12-04 RX ORDER — ALPRAZOLAM 0.5 MG/1
0.5 TABLET ORAL 2 TIMES DAILY PRN
Qty: 30 TABLET | Refills: 0 | Status: SHIPPED | OUTPATIENT
Start: 2018-12-04 | End: 2019-05-20

## 2018-12-04 NOTE — PROGRESS NOTES
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  Disp: 30 tablet Rfl: 0   triamcinolone acetonide 0.1 % External Cream Apply topically 2 (two) ti /84   Pulse 88   Temp 97.8 °F (36.6 °C) (Oral)   Resp 16   Ht 66\"   Wt 154 lb   SpO2 98%   BMI 24.86 kg/m²   Body mass index is 24.86 kg/m².    GENERAL: alert and oriented X 3, well developed, well nourished,in no apparent distress  CARDIO: RRR wit

## 2018-12-05 ENCOUNTER — LAB ENCOUNTER (OUTPATIENT)
Dept: LAB | Age: 58
End: 2018-12-05
Attending: FAMILY MEDICINE
Payer: COMMERCIAL

## 2018-12-05 DIAGNOSIS — R25.3 MUSCLE TWITCH: ICD-10-CM

## 2018-12-05 DIAGNOSIS — R53.83 OTHER FATIGUE: ICD-10-CM

## 2018-12-05 PROCEDURE — 82306 VITAMIN D 25 HYDROXY: CPT

## 2018-12-05 PROCEDURE — 82607 VITAMIN B-12: CPT

## 2018-12-05 PROCEDURE — 83735 ASSAY OF MAGNESIUM: CPT

## 2018-12-05 PROCEDURE — 84439 ASSAY OF FREE THYROXINE: CPT

## 2018-12-05 PROCEDURE — 84443 ASSAY THYROID STIM HORMONE: CPT

## 2018-12-05 PROCEDURE — 36415 COLL VENOUS BLD VENIPUNCTURE: CPT

## 2018-12-05 PROCEDURE — 80053 COMPREHEN METABOLIC PANEL: CPT

## 2018-12-05 PROCEDURE — 85025 COMPLETE CBC W/AUTO DIFF WBC: CPT

## 2018-12-26 RX ORDER — LEVOTHYROXINE SODIUM 0.1 MG/1
TABLET ORAL
Qty: 90 TABLET | Refills: 0 | Status: SHIPPED | OUTPATIENT
Start: 2018-12-26 | End: 2019-03-19

## 2018-12-27 ENCOUNTER — HOSPITAL ENCOUNTER (OUTPATIENT)
Dept: GENERAL RADIOLOGY | Age: 58
Discharge: HOME | End: 2018-12-27
Attending: PHYSICIAN ASSISTANT
Payer: COMMERCIAL

## 2018-12-27 ENCOUNTER — OFFICE VISIT (OUTPATIENT)
Dept: FAMILY MEDICINE CLINIC | Facility: CLINIC | Age: 58
End: 2018-12-27

## 2018-12-27 VITALS
OXYGEN SATURATION: 98 % | TEMPERATURE: 98 F | RESPIRATION RATE: 18 BRPM | BODY MASS INDEX: 24.75 KG/M2 | HEIGHT: 66 IN | HEART RATE: 75 BPM | WEIGHT: 154 LBS | SYSTOLIC BLOOD PRESSURE: 122 MMHG | DIASTOLIC BLOOD PRESSURE: 78 MMHG

## 2018-12-27 DIAGNOSIS — S90.111A CONTUSION OF RIGHT GREAT TOE WITHOUT DAMAGE TO NAIL, INITIAL ENCOUNTER: ICD-10-CM

## 2018-12-27 DIAGNOSIS — S90.111A CONTUSION OF RIGHT GREAT TOE WITHOUT DAMAGE TO NAIL, INITIAL ENCOUNTER: Primary | ICD-10-CM

## 2018-12-27 PROCEDURE — 73660 X-RAY EXAM OF TOE(S): CPT | Performed by: PHYSICIAN ASSISTANT

## 2018-12-27 PROCEDURE — 99213 OFFICE O/P EST LOW 20 MIN: CPT | Performed by: PHYSICIAN ASSISTANT

## 2018-12-27 NOTE — PROGRESS NOTES
HPI:   Jonah Barajas is a 63year old female who presents for a possible broken toe. She accidentally dropped a full wine bottle on her right big toe 1 week ago. Had pain right away. Did not have any bruising on her toe.  Small amount of bruising on her Tab Take 1 tablet (0.5 mg total) by mouth 2 (two) times daily as needed for Sleep or Anxiety. Disp: 30 tablet Rfl: 0   ondansetron (ZOFRAN ODT) 8 MG Oral Tablet Dispersible Take 1 tablet (8 mg total) by mouth every 8 (eight) hours as needed for Nausea.  Dis Father    • Cancer Father         basal cell skin   • Thyroid Disorder Sister    • Hypertension Sister    • Thyroid Disorder Sister    • Cancer Sister         basal cell skin   • Breast Cancer Mother [de-identified]      Social History:   Social History    Tobacco Use week if symptoms persist or sooner if they change or worsen.

## 2019-02-07 ENCOUNTER — OFFICE VISIT (OUTPATIENT)
Dept: FAMILY MEDICINE CLINIC | Facility: CLINIC | Age: 59
End: 2019-02-07
Payer: COMMERCIAL

## 2019-02-07 VITALS
BODY MASS INDEX: 24.75 KG/M2 | SYSTOLIC BLOOD PRESSURE: 120 MMHG | DIASTOLIC BLOOD PRESSURE: 80 MMHG | OXYGEN SATURATION: 98 % | HEART RATE: 69 BPM | HEIGHT: 66 IN | WEIGHT: 154 LBS | RESPIRATION RATE: 18 BRPM

## 2019-02-07 DIAGNOSIS — G50.0 TRIGEMINAL NEURALGIA OF LEFT SIDE OF FACE: ICD-10-CM

## 2019-02-07 DIAGNOSIS — M75.01 ADHESIVE CAPSULITIS OF RIGHT SHOULDER: ICD-10-CM

## 2019-02-07 DIAGNOSIS — M54.12 CERVICAL RADICULOPATHY, ACUTE: Primary | ICD-10-CM

## 2019-02-07 PROBLEM — R04.0 EPISTAXIS: Status: RESOLVED | Noted: 2018-05-21 | Resolved: 2019-02-07

## 2019-02-07 PROCEDURE — 99214 OFFICE O/P EST MOD 30 MIN: CPT | Performed by: FAMILY MEDICINE

## 2019-02-07 RX ORDER — GABAPENTIN 100 MG/1
100 CAPSULE ORAL 3 TIMES DAILY
Qty: 15 CAPSULE | Refills: 0 | Status: SHIPPED | OUTPATIENT
Start: 2019-02-07 | End: 2021-08-07

## 2019-02-07 NOTE — PROGRESS NOTES
Here with a bout of trigeminal neuralgia that flared up. She would like me to refill gabapentin. She has had several bouts of trigeminal neuralgia and this medicine has worked the best to controlling the pain. It is now resolved.     Has been treated for Rfl:    Omeprazole 40 MG Oral Capsule Delayed Release Take 1 capsule (40 mg total) by mouth daily. Disp: 30 capsule Rfl: 2   ALPRAZolam (XANAX) 0.5 MG Oral Tab Take 1 tablet (0.5 mg total) by mouth 2 (two) times daily as needed for Sleep or Anxiety.  Disp: arm.

## 2019-02-17 ENCOUNTER — PATIENT MESSAGE (OUTPATIENT)
Dept: FAMILY MEDICINE CLINIC | Facility: CLINIC | Age: 59
End: 2019-02-17

## 2019-02-17 DIAGNOSIS — K64.9 HEMORRHOIDS: ICD-10-CM

## 2019-02-19 ENCOUNTER — TELEPHONE (OUTPATIENT)
Dept: FAMILY MEDICINE CLINIC | Facility: CLINIC | Age: 59
End: 2019-02-19

## 2019-02-19 NOTE — TELEPHONE ENCOUNTER
Received PA for Anucort-HC 25mg REctal Suppositories    Patient ID #[id number]    Put in PA Triage

## 2019-02-19 NOTE — TELEPHONE ENCOUNTER
Per pharmacy - anusol and all other alternatives are not covered.      Please advise if alternate can be prescribed or if pt should use OTC

## 2019-03-05 ENCOUNTER — OFFICE VISIT (OUTPATIENT)
Dept: FAMILY MEDICINE CLINIC | Facility: CLINIC | Age: 59
End: 2019-03-05
Payer: COMMERCIAL

## 2019-03-05 VITALS
SYSTOLIC BLOOD PRESSURE: 132 MMHG | HEART RATE: 71 BPM | HEIGHT: 67 IN | TEMPERATURE: 98 F | DIASTOLIC BLOOD PRESSURE: 82 MMHG | OXYGEN SATURATION: 98 % | RESPIRATION RATE: 16 BRPM | WEIGHT: 155 LBS | BODY MASS INDEX: 24.33 KG/M2

## 2019-03-05 DIAGNOSIS — K64.4 EXTERNAL HEMORRHOIDS: ICD-10-CM

## 2019-03-05 DIAGNOSIS — R53.83 FATIGUE, UNSPECIFIED TYPE: Primary | ICD-10-CM

## 2019-03-05 PROCEDURE — 99213 OFFICE O/P EST LOW 20 MIN: CPT | Performed by: FAMILY MEDICINE

## 2019-03-05 NOTE — PROGRESS NOTES
HPI:   Elease City is a 63year old female who presents with fatigue     Pt always feel tired and this was also discuss in December; discussed the labs  Pt feels work is exhausting ; there is no down time during the week and pt will usually crash on t Rfl:    Fish Oil-Cholecalciferol (FISH OIL + D3 OR) Take 200 mg by mouth.    Disp:  Rfl:       Past Medical History:   Diagnosis Date   • Acute frontal sinusitis    • Acute maxillary sinusitis    • Acute serous otitis media    • Chronic cystic mastitis clear  EYES:PERRLA, EOMI, normal,conjunctiva are clear  SKIN:normal   GI: good BS's,no masses, HSM, no tenderness, no RRG   EXTREMITIES: no cyanosis, clubbing or edema  NEURO: cranial nerves are intact,motor and sensory are grossly intact  PSYCH: normal mo

## 2019-03-07 ENCOUNTER — OFFICE VISIT (OUTPATIENT)
Dept: SURGERY | Facility: CLINIC | Age: 59
End: 2019-03-07
Payer: COMMERCIAL

## 2019-03-07 VITALS
BODY MASS INDEX: 23.7 KG/M2 | HEIGHT: 67 IN | TEMPERATURE: 98 F | WEIGHT: 151 LBS | HEART RATE: 84 BPM | RESPIRATION RATE: 16 BRPM | DIASTOLIC BLOOD PRESSURE: 85 MMHG | SYSTOLIC BLOOD PRESSURE: 138 MMHG

## 2019-03-07 DIAGNOSIS — K62.5 RECTAL BLEED: ICD-10-CM

## 2019-03-07 DIAGNOSIS — K62.89 RECTAL PAIN: ICD-10-CM

## 2019-03-07 DIAGNOSIS — K64.8 INTERNAL HEMORRHOIDS: Primary | ICD-10-CM

## 2019-03-07 PROCEDURE — 99243 OFF/OP CNSLTJ NEW/EST LOW 30: CPT | Performed by: SURGERY

## 2019-03-07 PROCEDURE — 46600 DIAGNOSTIC ANOSCOPY SPX: CPT | Performed by: SURGERY

## 2019-03-07 NOTE — H&P
New Patient Visit Note       Active Problems      1. Internal hemorrhoids    2. Rectal pain    3.  Rectal bleed        Chief Complaint   Patient presents with:  Hemorrhoids: New Patient referred by Dr. Abdirahman Castaneda for external hemorrhoids- Pt. is c/o pain x 3 w tablet (8 mg total) by mouth every 8 (eight) hours as needed for Nausea., Disp: 8 tablet, Rfl: 3  •  Vitamin C 500 MG Oral Tab, Take 500 mg by mouth daily. , Disp: , Rfl:   •  Calcium Citrate (CITRACAL OR), Take  by mouth., Disp: , Rfl:   •  vitamin E 1000 lb   BMI 23.65 kg/m²   Physical Exam   Constitutional: She is oriented to person, place, and time. She appears well-developed and well-nourished. HENT:   Head: Normocephalic and atraumatic.    Eyes: EOM are normal. Pupils are equal, round, and reactive to pain  Rectal bleed  Episode of acute inflammation of internal hemorrhoids due to constipation-constipation now resolved and hemorrhoidal symptoms improved with use of topical hydrocortisone      Plan     Continue hydrocortisone cream, sitz baths as symptom

## 2019-03-09 ENCOUNTER — LAB ENCOUNTER (OUTPATIENT)
Dept: LAB | Age: 59
End: 2019-03-09
Attending: FAMILY MEDICINE
Payer: COMMERCIAL

## 2019-03-09 DIAGNOSIS — R53.83 FATIGUE, UNSPECIFIED TYPE: ICD-10-CM

## 2019-03-09 LAB
BASOPHILS # BLD AUTO: 0.02 X10(3) UL (ref 0–0.2)
BASOPHILS NFR BLD AUTO: 0.4 %
DEPRECATED HBV CORE AB SER IA-ACNC: 63.7 NG/ML (ref 18–340)
DEPRECATED RDW RBC AUTO: 41.4 FL (ref 35.1–46.3)
EOSINOPHIL # BLD AUTO: 0.18 X10(3) UL (ref 0–0.7)
EOSINOPHIL NFR BLD AUTO: 3.6 %
ERYTHROCYTE [DISTWIDTH] IN BLOOD BY AUTOMATED COUNT: 12 % (ref 11–15)
HCT VFR BLD AUTO: 39.8 % (ref 35–48)
HGB BLD-MCNC: 13.6 G/DL (ref 12–16)
IMM GRANULOCYTES # BLD AUTO: 0.01 X10(3) UL (ref 0–1)
IMM GRANULOCYTES NFR BLD: 0.2 %
IRON SATURATION: 35 % (ref 15–50)
IRON SERPL-MCNC: 118 UG/DL (ref 50–170)
LYMPHOCYTES # BLD AUTO: 1.58 X10(3) UL (ref 1–4)
LYMPHOCYTES NFR BLD AUTO: 31.3 %
MCH RBC QN AUTO: 32.3 PG (ref 26–34)
MCHC RBC AUTO-ENTMCNC: 34.2 G/DL (ref 31–37)
MCV RBC AUTO: 94.5 FL (ref 80–100)
MONOCYTES # BLD AUTO: 0.48 X10(3) UL (ref 0.1–1)
MONOCYTES NFR BLD AUTO: 9.5 %
NEUTROPHILS # BLD AUTO: 2.77 X10 (3) UL (ref 1.5–7.7)
NEUTROPHILS # BLD AUTO: 2.77 X10(3) UL (ref 1.5–7.7)
NEUTROPHILS NFR BLD AUTO: 55 %
PLATELET # BLD AUTO: 228 10(3)UL (ref 150–450)
RBC # BLD AUTO: 4.21 X10(6)UL (ref 3.8–5.3)
T4 FREE SERPL-MCNC: 1.3 NG/DL (ref 0.8–1.7)
TOTAL IRON BINDING CAPACITY: 335 UG/DL (ref 240–450)
TRANSFERRIN SERPL-MCNC: 225 MG/DL (ref 200–360)
TSI SER-ACNC: 0.55 MIU/ML (ref 0.36–3.74)
WBC # BLD AUTO: 5 X10(3) UL (ref 4–11)

## 2019-03-09 PROCEDURE — 83550 IRON BINDING TEST: CPT | Performed by: FAMILY MEDICINE

## 2019-03-09 PROCEDURE — 36415 COLL VENOUS BLD VENIPUNCTURE: CPT | Performed by: FAMILY MEDICINE

## 2019-03-09 PROCEDURE — 85025 COMPLETE CBC W/AUTO DIFF WBC: CPT | Performed by: FAMILY MEDICINE

## 2019-03-09 PROCEDURE — 84439 ASSAY OF FREE THYROXINE: CPT | Performed by: FAMILY MEDICINE

## 2019-03-09 PROCEDURE — 82728 ASSAY OF FERRITIN: CPT | Performed by: FAMILY MEDICINE

## 2019-03-09 PROCEDURE — 84443 ASSAY THYROID STIM HORMONE: CPT | Performed by: FAMILY MEDICINE

## 2019-03-09 PROCEDURE — 83540 ASSAY OF IRON: CPT | Performed by: FAMILY MEDICINE

## 2019-03-13 NOTE — PROCEDURES
Date of procedure:   March 7, 2019    Preop Diagnosis:  Hemorrhoidal disease    Postop diagnosis:   Same    Procedure:  Anoscopy    Surgeon:   Darryl Kelly    Anesthesia:   None    Findings:   Circumferential perianal skin-partially prolapsed internal hemorrhoi

## 2019-03-20 RX ORDER — LEVOTHYROXINE SODIUM 0.1 MG/1
TABLET ORAL
Qty: 90 TABLET | Refills: 0 | Status: SHIPPED | OUTPATIENT
Start: 2019-03-20 | End: 2019-04-30

## 2019-03-20 NOTE — TELEPHONE ENCOUNTER
Rx Request  LEVOTHYROXINE SODIUM 100 MCG Oral Tab      Disp:     90               R: 0       Last Visit: 03/05/2019    Last Refilled: 12/26/2018    Protocol Passed?  Yes[ X ]       No[  ]

## 2019-03-24 ENCOUNTER — PATIENT MESSAGE (OUTPATIENT)
Dept: FAMILY MEDICINE CLINIC | Facility: CLINIC | Age: 59
End: 2019-03-24

## 2019-03-25 RX ORDER — AZITHROMYCIN 250 MG/1
TABLET, FILM COATED ORAL
Qty: 6 TABLET | Refills: 0 | Status: SHIPPED | OUTPATIENT
Start: 2019-03-25 | End: 2019-05-06 | Stop reason: ALTCHOICE

## 2019-03-25 NOTE — TELEPHONE ENCOUNTER
Please see below, will you rx abx for ear infection or would you like pt to be seen at [address] [place] [address] on vacation?       Hi,  I have had symptoms of an ear infection for about a week and a half, and after a painful flight yesterday,  I know I have a double ear infectio

## 2019-03-25 NOTE — TELEPHONE ENCOUNTER
From: Michael Kenyon  To: Siena Greenwood MD  Sent: 3/24/2019 9:21 AM CDT  Subject: Prescription Question    Hi,  I have had symptoms of an ear infection for about a week and a half, and after a painful flight yesterday, I know I have a double ear infec

## 2019-04-27 ENCOUNTER — PATIENT MESSAGE (OUTPATIENT)
Dept: FAMILY MEDICINE CLINIC | Facility: CLINIC | Age: 59
End: 2019-04-27

## 2019-04-29 NOTE — TELEPHONE ENCOUNTER
From: Berry Romero  To: Chapito Young DO  Sent: 4/27/2019 7:24 PM CDT  Subject: Prescription Question    Hi,  Could you please switch my pharmacy from [place] to [address] [place] [address] on [address] in [place].  I need a 3 month refill sent there for my Levothy

## 2019-04-30 RX ORDER — LEVOTHYROXINE SODIUM 0.1 MG/1
100 TABLET ORAL
Qty: 90 TABLET | Refills: 0 | Status: SHIPPED | OUTPATIENT
Start: 2019-04-30 | End: 2019-07-21

## 2019-05-01 ENCOUNTER — OFFICE VISIT (OUTPATIENT)
Dept: FAMILY MEDICINE CLINIC | Facility: CLINIC | Age: 59
End: 2019-05-01
Payer: COMMERCIAL

## 2019-05-01 VITALS
HEART RATE: 66 BPM | RESPIRATION RATE: 20 BRPM | BODY MASS INDEX: 23.7 KG/M2 | DIASTOLIC BLOOD PRESSURE: 82 MMHG | WEIGHT: 151 LBS | SYSTOLIC BLOOD PRESSURE: 114 MMHG | OXYGEN SATURATION: 98 % | TEMPERATURE: 98 F | HEIGHT: 67 IN

## 2019-05-01 DIAGNOSIS — J32.0 LEFT MAXILLARY SINUSITIS: Primary | ICD-10-CM

## 2019-05-01 DIAGNOSIS — H92.02 LEFT EAR PAIN: ICD-10-CM

## 2019-05-01 PROCEDURE — 99213 OFFICE O/P EST LOW 20 MIN: CPT | Performed by: FAMILY MEDICINE

## 2019-05-01 RX ORDER — DOXYCYCLINE HYCLATE 100 MG
100 TABLET ORAL 2 TIMES DAILY
Qty: 14 TABLET | Refills: 0 | Status: SHIPPED | OUTPATIENT
Start: 2019-05-01 | End: 2019-05-01

## 2019-05-01 RX ORDER — PREDNISONE 20 MG/1
TABLET ORAL
Qty: 13 TABLET | Refills: 0 | Status: SHIPPED | OUTPATIENT
Start: 2019-05-01 | End: 2019-05-01

## 2019-05-01 RX ORDER — PREDNISONE 20 MG/1
TABLET ORAL
Qty: 13 TABLET | Refills: 0 | Status: SHIPPED | OUTPATIENT
Start: 2019-05-01 | End: 2019-08-09

## 2019-05-01 RX ORDER — DOXYCYCLINE HYCLATE 100 MG
100 TABLET ORAL 2 TIMES DAILY
Qty: 14 TABLET | Refills: 0 | Status: SHIPPED | OUTPATIENT
Start: 2019-05-01 | End: 2019-08-09

## 2019-05-01 NOTE — PROGRESS NOTES
HPI:    Patient ID: Amaury Marin is a 63year old female. Ear Pain    There is pain in the left ear. This is a new problem. Episode onset: 1 week. The problem occurs constantly. The problem has been unchanged. There has been no fever.  The pain is mild 8 tablet Rfl: 3   Vitamin C 500 MG Oral Tab Take 500 mg by mouth daily. Disp:  Rfl:    Calcium Citrate (CITRACAL OR) Take  by mouth. Disp:  Rfl:    vitamin E 1000 UNITS Oral Cap Take 1,000 Units by mouth daily.  Disp:  Rfl:    [address] or if symptoms worsen or fail to improve. 1. Left maxillary sinusitis  - Doxycycline Hyclate 100 MG Oral Tab; Take 1 tablet (100 mg total) by mouth 2 (two) times daily. Dispense: 14 tablet;  Refill: 0  - predniSONE 20 MG Oral Tab; 2.5 tab day 1-3, 2 tab

## 2019-05-06 ENCOUNTER — OFFICE VISIT (OUTPATIENT)
Dept: FAMILY MEDICINE CLINIC | Facility: CLINIC | Age: 59
End: 2019-05-06
Payer: COMMERCIAL

## 2019-05-06 VITALS
HEART RATE: 90 BPM | SYSTOLIC BLOOD PRESSURE: 126 MMHG | TEMPERATURE: 98 F | DIASTOLIC BLOOD PRESSURE: 78 MMHG | RESPIRATION RATE: 18 BRPM | WEIGHT: 151 LBS | HEIGHT: 67 IN | OXYGEN SATURATION: 99 % | BODY MASS INDEX: 23.7 KG/M2

## 2019-05-06 DIAGNOSIS — M54.50 LUMBAR PAIN: Primary | ICD-10-CM

## 2019-05-06 PROCEDURE — 99214 OFFICE O/P EST MOD 30 MIN: CPT | Performed by: FAMILY MEDICINE

## 2019-05-06 RX ORDER — PREDNISONE 20 MG/1
60 TABLET ORAL DAILY
Qty: 15 TABLET | Refills: 0 | Status: SHIPPED | OUTPATIENT
Start: 2019-05-06 | End: 2019-05-11

## 2019-05-06 RX ORDER — CYCLOBENZAPRINE HCL 5 MG
5 TABLET ORAL 3 TIMES DAILY PRN
Qty: 20 TABLET | Refills: 0 | Status: SHIPPED | OUTPATIENT
Start: 2019-05-06 | End: 2020-01-30 | Stop reason: ALTCHOICE

## 2019-05-06 RX ORDER — ONDANSETRON 8 MG/1
8 TABLET, ORALLY DISINTEGRATING ORAL EVERY 8 HOURS PRN
Qty: 20 TABLET | Refills: 1 | Status: SHIPPED | OUTPATIENT
Start: 2019-05-06 | End: 2020-10-06

## 2019-05-06 NOTE — PROGRESS NOTES
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 Lumbar pain    - predniSONE 20 MG Oral Tab; Take 3 tablets (60 mg total) by mouth daily for 5 days. Dispense: 15 tablet; Refill: 0  - Cyclobenzaprine HCl 5 MG Oral Tab;  Take 1 table

## 2019-05-20 ENCOUNTER — OFFICE VISIT (OUTPATIENT)
Dept: FAMILY MEDICINE CLINIC | Facility: CLINIC | Age: 59
End: 2019-05-20
Payer: COMMERCIAL

## 2019-05-20 VITALS
BODY MASS INDEX: 24.48 KG/M2 | HEART RATE: 80 BPM | OXYGEN SATURATION: 98 % | DIASTOLIC BLOOD PRESSURE: 78 MMHG | SYSTOLIC BLOOD PRESSURE: 122 MMHG | TEMPERATURE: 99 F | RESPIRATION RATE: 16 BRPM | WEIGHT: 156 LBS | HEIGHT: 67 IN

## 2019-05-20 DIAGNOSIS — F41.9 ANXIETY: ICD-10-CM

## 2019-05-20 DIAGNOSIS — M54.50 LUMBAR PAIN: Primary | ICD-10-CM

## 2019-05-20 PROCEDURE — 99213 OFFICE O/P EST LOW 20 MIN: CPT | Performed by: FAMILY MEDICINE

## 2019-05-20 RX ORDER — ALPRAZOLAM 0.5 MG/1
0.5 TABLET ORAL 2 TIMES DAILY PRN
Qty: 30 TABLET | Refills: 1 | Status: SHIPPED | OUTPATIENT
Start: 2019-05-20 | End: 2020-10-06

## 2019-05-20 NOTE — PROGRESS NOTES
HPI:   Ese Velez is a 63year old female here to follow up on back pain and anxiety     Pt was better s/p flexeril and prednisone   Pt was doing better until a few days ago   Pt reports she now has some pain in gluteal region and into bilateral uppe by mouth. Take one tablet daily Disp:  Rfl:    Multiple Vitamins-Minerals (MULTIVITAMIN OR) Take  by mouth. Disp:  Rfl:    Cholecalciferol (VITAMIN D) 1000 UNITS Oral Tab Take  by mouth.  Disp:  Rfl:    Fish Oil-Cholecalciferol (FISH OIL + D3 OR) Take 200 m nourished,in no apparent distress  BACK: + lumbar tenderness  LE: 5+ Strength 2+ DTR's normal sensation bilaterally   NEURO: cranial nerves are intact,motor and sensory are grossly intact  PSYCH: normal mood and affect good eye contact appropriate     ASSE

## 2019-06-04 ENCOUNTER — HOSPITAL ENCOUNTER (OUTPATIENT)
Dept: MAMMOGRAPHY | Age: 59
Discharge: HOME | End: 2019-06-04
Attending: FAMILY MEDICINE
Payer: COMMERCIAL

## 2019-06-04 DIAGNOSIS — Z12.31 ENCOUNTER FOR SCREENING MAMMOGRAM FOR HIGH-RISK PATIENT: ICD-10-CM

## 2019-06-04 PROCEDURE — 77067 SCR MAMMO BI INCL CAD: CPT | Performed by: FAMILY MEDICINE

## 2019-06-04 PROCEDURE — 77063 BREAST TOMOSYNTHESIS BI: CPT | Performed by: FAMILY MEDICINE

## 2019-06-12 ENCOUNTER — E-VISIT (OUTPATIENT)
Dept: FAMILY MEDICINE CLINIC | Facility: CLINIC | Age: 59
End: 2019-06-12

## 2019-06-12 DIAGNOSIS — J01.90 ACUTE NON-RECURRENT SINUSITIS, UNSPECIFIED LOCATION: Primary | ICD-10-CM

## 2019-06-12 PROCEDURE — 98969 ONLINE SERVICE BY HC PRO: CPT | Performed by: NURSE PRACTITIONER

## 2019-06-12 RX ORDER — AZITHROMYCIN 250 MG/1
TABLET, FILM COATED ORAL
Qty: 6 TABLET | Refills: 0 | Status: SHIPPED | OUTPATIENT
Start: 2019-06-12 | End: 2019-08-09

## 2019-06-12 NOTE — PROGRESS NOTES
Yasmeen Pulido is a 63year old female. HPI:   See answers to questions above. Current Outpatient Medications:  ALPRAZolam (XANAX) 0.5 MG Oral Tab Take 1 tablet (0.5 mg total) by mouth 2 (two) times daily as needed for Sleep or Anxiety.  Disp: 30 t cerumen    • Normal delivery    • Other acute otitis externa    • Personal history of traumatic brain injury     concussion      No past surgical history on file.    Family History   Problem Relation Age of Onset   • Heart Disease Father    • Hypertension F

## 2019-07-22 RX ORDER — LEVOTHYROXINE SODIUM 0.1 MG/1
TABLET ORAL
Qty: 90 TABLET | Refills: 0 | Status: SHIPPED | OUTPATIENT
Start: 2019-07-22 | End: 2019-10-14

## 2019-08-09 ENCOUNTER — OFFICE VISIT (OUTPATIENT)
Dept: FAMILY MEDICINE CLINIC | Facility: CLINIC | Age: 59
End: 2019-08-09
Payer: COMMERCIAL

## 2019-08-09 VITALS
HEIGHT: 66.5 IN | SYSTOLIC BLOOD PRESSURE: 114 MMHG | WEIGHT: 157 LBS | TEMPERATURE: 98 F | BODY MASS INDEX: 24.93 KG/M2 | RESPIRATION RATE: 16 BRPM | DIASTOLIC BLOOD PRESSURE: 70 MMHG | HEART RATE: 77 BPM

## 2019-08-09 DIAGNOSIS — D22.9 ATYPICAL NEVI: ICD-10-CM

## 2019-08-09 DIAGNOSIS — Z00.00 ANNUAL PHYSICAL EXAM: Primary | ICD-10-CM

## 2019-08-09 DIAGNOSIS — Z12.31 ENCOUNTER FOR SCREENING MAMMOGRAM FOR HIGH-RISK PATIENT: ICD-10-CM

## 2019-08-09 PROCEDURE — 99396 PREV VISIT EST AGE 40-64: CPT | Performed by: FAMILY MEDICINE

## 2019-08-09 PROCEDURE — 87624 HPV HI-RISK TYP POOLED RSLT: CPT | Performed by: FAMILY MEDICINE

## 2019-08-09 PROCEDURE — 88175 CYTOPATH C/V AUTO FLUID REDO: CPT | Performed by: FAMILY MEDICINE

## 2019-08-09 NOTE — PROGRESS NOTES
HPI:   Tlyer Ferreira is a 63year old female who presents for a complete physical exam.     Wt Readings from Last 6 Encounters:  08/09/19 : 157 lb  05/20/19 : 156 lb  05/06/19 : 151 lb  05/01/19 : 151 lb  03/07/19 : 151 lb  03/05/19 : 155 lb    Body mas (VITAMIN D) 1000 UNITS Oral Tab Take  by mouth. Disp:  Rfl:    Fish Oil-Cholecalciferol (FISH OIL + D3 OR) Take 200 mg by mouth. Disp:  Rfl:    azithromycin 250 MG Oral Tab Take two tablets by mouth today, then one daily.  Disp: 6 tablet Rfl: 0   Doxycycl Temp 98.3 °F (36.8 °C) (Oral)   Resp 16   Ht 66.5\"   Wt 157 lb   BMI 24.96 kg/m²   Body mass index is 24.96 kg/m².    GENERAL: alert and oriented X 3, well developed, well nourished,in no apparent distress  CARDIO: RRR without murmur  LUNGS: clear to ausc

## 2019-08-11 LAB — HPV I/H RISK 1 DNA SPEC QL NAA+PROBE: NEGATIVE

## 2019-08-14 ENCOUNTER — LAB ENCOUNTER (OUTPATIENT)
Dept: LAB | Age: 59
End: 2019-08-14
Attending: FAMILY MEDICINE
Payer: COMMERCIAL

## 2019-08-14 DIAGNOSIS — Z00.00 ANNUAL PHYSICAL EXAM: ICD-10-CM

## 2019-08-14 LAB
ALBUMIN SERPL-MCNC: 4.2 G/DL (ref 3.4–5)
ALBUMIN/GLOB SERPL: 1.5 {RATIO} (ref 1–2)
ALP LIVER SERPL-CCNC: 96 U/L (ref 46–118)
ALT SERPL-CCNC: 35 U/L (ref 13–56)
ANION GAP SERPL CALC-SCNC: 3 MMOL/L (ref 0–18)
AST SERPL-CCNC: 21 U/L (ref 15–37)
BASOPHILS # BLD AUTO: 0.02 X10(3) UL (ref 0–0.2)
BASOPHILS NFR BLD AUTO: 0.5 %
BILIRUB SERPL-MCNC: 0.8 MG/DL (ref 0.1–2)
BUN BLD-MCNC: 14 MG/DL (ref 7–18)
BUN/CREAT SERPL: 16.5 (ref 10–20)
CALCIUM BLD-MCNC: 9.1 MG/DL (ref 8.5–10.1)
CHLORIDE SERPL-SCNC: 108 MMOL/L (ref 98–112)
CHOLEST SMN-MCNC: 155 MG/DL (ref ?–200)
CO2 SERPL-SCNC: 29 MMOL/L (ref 21–32)
CREAT BLD-MCNC: 0.85 MG/DL (ref 0.55–1.02)
DEPRECATED RDW RBC AUTO: 43.2 FL (ref 35.1–46.3)
EOSINOPHIL # BLD AUTO: 0.19 X10(3) UL (ref 0–0.7)
EOSINOPHIL NFR BLD AUTO: 4.9 %
ERYTHROCYTE [DISTWIDTH] IN BLOOD BY AUTOMATED COUNT: 12.2 % (ref 11–15)
GLOBULIN PLAS-MCNC: 2.8 G/DL (ref 2.8–4.4)
GLUCOSE BLD-MCNC: 87 MG/DL (ref 70–99)
HCT VFR BLD AUTO: 41.3 % (ref 35–48)
HDLC SERPL-MCNC: 66 MG/DL (ref 40–59)
HGB BLD-MCNC: 13.9 G/DL (ref 12–16)
IMM GRANULOCYTES # BLD AUTO: 0.01 X10(3) UL (ref 0–1)
IMM GRANULOCYTES NFR BLD: 0.3 %
LDLC SERPL CALC-MCNC: 74 MG/DL (ref ?–100)
LYMPHOCYTES # BLD AUTO: 1.59 X10(3) UL (ref 1–4)
LYMPHOCYTES NFR BLD AUTO: 40.7 %
M PROTEIN MFR SERPL ELPH: 7 G/DL (ref 6.4–8.2)
MCH RBC QN AUTO: 32.6 PG (ref 26–34)
MCHC RBC AUTO-ENTMCNC: 33.7 G/DL (ref 31–37)
MCV RBC AUTO: 96.9 FL (ref 80–100)
MONOCYTES # BLD AUTO: 0.4 X10(3) UL (ref 0.1–1)
MONOCYTES NFR BLD AUTO: 10.2 %
NEUTROPHILS # BLD AUTO: 1.7 X10 (3) UL (ref 1.5–7.7)
NEUTROPHILS # BLD AUTO: 1.7 X10(3) UL (ref 1.5–7.7)
NEUTROPHILS NFR BLD AUTO: 43.4 %
NONHDLC SERPL-MCNC: 89 MG/DL (ref ?–130)
OSMOLALITY SERPL CALC.SUM OF ELEC: 290 MOSM/KG (ref 275–295)
PLATELET # BLD AUTO: 215 10(3)UL (ref 150–450)
POTASSIUM SERPL-SCNC: 4 MMOL/L (ref 3.5–5.1)
RBC # BLD AUTO: 4.26 X10(6)UL (ref 3.8–5.3)
SODIUM SERPL-SCNC: 140 MMOL/L (ref 136–145)
T4 FREE SERPL-MCNC: 1.1 NG/DL (ref 0.8–1.7)
TRIGL SERPL-MCNC: 73 MG/DL (ref 30–149)
TSI SER-ACNC: 1.07 MIU/ML (ref 0.36–3.74)
VIT B12 SERPL-MCNC: 704 PG/ML (ref 193–986)
VIT D+METAB SERPL-MCNC: 55.8 NG/ML (ref 30–100)
VLDLC SERPL CALC-MCNC: 15 MG/DL (ref 0–30)
WBC # BLD AUTO: 3.9 X10(3) UL (ref 4–11)

## 2019-08-14 PROCEDURE — 80061 LIPID PANEL: CPT | Performed by: FAMILY MEDICINE

## 2019-08-14 PROCEDURE — 84439 ASSAY OF FREE THYROXINE: CPT | Performed by: FAMILY MEDICINE

## 2019-08-14 PROCEDURE — 82607 VITAMIN B-12: CPT | Performed by: FAMILY MEDICINE

## 2019-08-14 PROCEDURE — 36415 COLL VENOUS BLD VENIPUNCTURE: CPT | Performed by: FAMILY MEDICINE

## 2019-08-14 PROCEDURE — 82306 VITAMIN D 25 HYDROXY: CPT | Performed by: FAMILY MEDICINE

## 2019-08-14 PROCEDURE — 80050 GENERAL HEALTH PANEL: CPT | Performed by: FAMILY MEDICINE

## 2019-09-17 ENCOUNTER — OFFICE VISIT (OUTPATIENT)
Dept: FAMILY MEDICINE CLINIC | Facility: CLINIC | Age: 59
End: 2019-09-17
Payer: COMMERCIAL

## 2019-09-17 ENCOUNTER — LAB ENCOUNTER (OUTPATIENT)
Dept: LAB | Age: 59
End: 2019-09-17
Attending: FAMILY MEDICINE
Payer: COMMERCIAL

## 2019-09-17 VITALS
HEIGHT: 67 IN | SYSTOLIC BLOOD PRESSURE: 126 MMHG | WEIGHT: 153.19 LBS | BODY MASS INDEX: 24.04 KG/M2 | DIASTOLIC BLOOD PRESSURE: 82 MMHG | TEMPERATURE: 98 F

## 2019-09-17 DIAGNOSIS — K21.9 GASTROESOPHAGEAL REFLUX DISEASE, ESOPHAGITIS PRESENCE NOT SPECIFIED: Primary | ICD-10-CM

## 2019-09-17 DIAGNOSIS — Z91.018 FOOD ALLERGY: ICD-10-CM

## 2019-09-17 PROCEDURE — 86003 ALLG SPEC IGE CRUDE XTRC EA: CPT | Performed by: FAMILY MEDICINE

## 2019-09-17 PROCEDURE — 36415 COLL VENOUS BLD VENIPUNCTURE: CPT | Performed by: FAMILY MEDICINE

## 2019-09-17 PROCEDURE — 99213 OFFICE O/P EST LOW 20 MIN: CPT | Performed by: FAMILY MEDICINE

## 2019-09-17 RX ORDER — CLINDAMYCIN HYDROCHLORIDE 150 MG/1
CAPSULE ORAL
Refills: 0 | COMMUNITY
Start: 2019-09-03

## 2019-09-17 RX ORDER — OMEPRAZOLE 40 MG/1
40 CAPSULE, DELAYED RELEASE ORAL DAILY
Qty: 30 CAPSULE | Refills: 1 | Status: SHIPPED | OUTPATIENT
Start: 2019-09-17 | End: 2019-10-09

## 2019-09-17 NOTE — PATIENT INSTRUCTIONS
Take omeprazole for 8 weeks. If symptoms recur at that time or shortly thereafter will recommend upper endoscopy.

## 2019-09-17 NOTE — PROGRESS NOTES
Here with several weeks of heartburn causing dirt taste in the mouth. 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 She reports some sinus congestion coming on in the last 4 days.   She has a cough that is happening only at night when she lies mg total) by mouth 2 (two) times daily as needed for Sleep or Anxiety. Disp: 30 tablet Rfl: 1   ondansetron (ZOFRAN ODT) 8 MG Oral Tablet Dispersible Take 1 tablet (8 mg total) by mouth every 8 (eight) hours as needed for Nausea.  Disp: 20 tablet Rfl: 1   C

## 2019-09-19 LAB
ALLERGEN,  SHRIMP IGE: 0.14 KU/L
ALLERGEN, ALP-LACTALBUMIN: <0.1 KU/L
ALLERGEN, BETA-LACTOGLOB, IGE: <0.1 KU/L
ALLERGEN, CASEIN IGE: <0.1 KU/L
ALLERGEN, MILK (COW) IGE: 0.73 KU/L

## 2019-10-09 DIAGNOSIS — K21.9 GASTROESOPHAGEAL REFLUX DISEASE, ESOPHAGITIS PRESENCE NOT SPECIFIED: ICD-10-CM

## 2019-10-09 RX ORDER — OMEPRAZOLE 40 MG/1
CAPSULE, DELAYED RELEASE ORAL
Qty: 30 CAPSULE | Refills: 1 | Status: SHIPPED | OUTPATIENT
Start: 2019-10-09 | End: 2020-01-30 | Stop reason: ALTCHOICE

## 2019-10-14 RX ORDER — LEVOTHYROXINE SODIUM 0.1 MG/1
TABLET ORAL
Qty: 90 TABLET | Refills: 0 | Status: SHIPPED | OUTPATIENT
Start: 2019-10-14 | End: 2020-01-10

## 2019-11-14 ENCOUNTER — MED REC SCAN ONLY (OUTPATIENT)
Dept: FAMILY MEDICINE CLINIC | Facility: CLINIC | Age: 59
End: 2019-11-14

## 2019-11-14 ENCOUNTER — IMMUNIZATION (OUTPATIENT)
Dept: FAMILY MEDICINE CLINIC | Facility: CLINIC | Age: 59
End: 2019-11-14
Payer: COMMERCIAL

## 2019-11-14 DIAGNOSIS — Z23 NEED FOR VACCINATION: ICD-10-CM

## 2019-11-14 PROCEDURE — 90471 IMMUNIZATION ADMIN: CPT | Performed by: FAMILY MEDICINE

## 2019-11-14 PROCEDURE — 90686 IIV4 VACC NO PRSV 0.5 ML IM: CPT | Performed by: FAMILY MEDICINE

## 2019-12-10 ENCOUNTER — OFFICE VISIT (OUTPATIENT)
Dept: FAMILY MEDICINE CLINIC | Facility: CLINIC | Age: 59
End: 2019-12-10
Payer: COMMERCIAL

## 2019-12-10 VITALS
OXYGEN SATURATION: 99 % | HEART RATE: 86 BPM | WEIGHT: 157 LBS | BODY MASS INDEX: 24.64 KG/M2 | TEMPERATURE: 98 F | HEIGHT: 67 IN | DIASTOLIC BLOOD PRESSURE: 72 MMHG | SYSTOLIC BLOOD PRESSURE: 108 MMHG

## 2019-12-10 DIAGNOSIS — H69.82 ETD (EUSTACHIAN TUBE DYSFUNCTION), LEFT: Primary | ICD-10-CM

## 2019-12-10 PROCEDURE — 99213 OFFICE O/P EST LOW 20 MIN: CPT | Performed by: NURSE PRACTITIONER

## 2019-12-10 RX ORDER — METHYLPREDNISOLONE 4 MG/1
TABLET ORAL
Qty: 1 KIT | Refills: 0 | Status: SHIPPED | OUTPATIENT
Start: 2019-12-10 | End: 2020-01-30 | Stop reason: ALTCHOICE

## 2019-12-10 RX ORDER — FLUTICASONE PROPIONATE 50 MCG
2 SPRAY, SUSPENSION (ML) NASAL DAILY
Qty: 1 BOTTLE | Refills: 0 | Status: SHIPPED | OUTPATIENT
Start: 2019-12-10 | End: 2020-10-27

## 2019-12-10 NOTE — PROGRESS NOTES
CHIEF COMPLAINT:   Patient presents with:  Ear Pain: sinus pressure and left ear pain x 1 week. HPI:   Henna Cross is a 63year old female who presents to clinic today with complaints of left ear pain. Has had for 1  weeks.  Pain is described a • ALPRAZolam (XANAX) 0.5 MG Oral Tab Take 1 tablet (0.5 mg total) by mouth 2 (two) times daily as needed for Sleep or Anxiety.  (Patient not taking: Reported on 12/10/2019 ) 30 tablet 1   • Cyclobenzaprine HCl 5 MG Oral Tab Take 1 tablet (5 mg total) by luis eduardo LUNGS: clear to auscultation bilaterally, no wheezes or rhonchi. Breathing is non labored. CARDIO: RRR without murmur  EXTREMITIES: no cyanosis, clubbing or edema  LYMPH: No lymphadenopathy.       ASSESSMENT AND PLAN:   Berry Romero is a 63year old fe Sound waves may be disrupted before they reach the inner ear. If this happens, conductive hearing loss may occur. The ear canal can be blocked by wax, infection, a tumor, or a foreign object. The eardrum can be injured or infected.  Abnormal bone growth, in

## 2020-01-03 ENCOUNTER — OFFICE VISIT (OUTPATIENT)
Dept: FAMILY MEDICINE CLINIC | Facility: CLINIC | Age: 60
End: 2020-01-03
Payer: COMMERCIAL

## 2020-01-03 VITALS
HEIGHT: 67 IN | RESPIRATION RATE: 20 BRPM | OXYGEN SATURATION: 98 % | TEMPERATURE: 99 F | SYSTOLIC BLOOD PRESSURE: 116 MMHG | HEART RATE: 72 BPM | WEIGHT: 155 LBS | BODY MASS INDEX: 24.33 KG/M2 | DIASTOLIC BLOOD PRESSURE: 76 MMHG

## 2020-01-03 DIAGNOSIS — L73.9 INFLAMED HAIR FOLLICLE: ICD-10-CM

## 2020-01-03 DIAGNOSIS — N90.89 VULVAR IRRITATION: Primary | ICD-10-CM

## 2020-01-03 PROCEDURE — 99213 OFFICE O/P EST LOW 20 MIN: CPT | Performed by: PHYSICIAN ASSISTANT

## 2020-01-03 RX ORDER — MUPIROCIN CALCIUM 20 MG/G
1 CREAM TOPICAL DAILY
Qty: 30 G | Refills: 1 | Status: SHIPPED | OUTPATIENT
Start: 2020-01-03 | End: 2020-01-17

## 2020-01-03 NOTE — PROGRESS NOTES
HPI:    Patient ID: Gilberto Antoine is a 63year old female. HPI   Patient presents today with concerns of discomfort in the vulvar area. She has history of Bartholin cysts but her symptoms are different now.   Has had symptoms for several days that ar • gabapentin 100 MG Oral Cap Take 1 capsule (100 mg total) by mouth 3 (three) times daily. Prn facial neuralgia 15 capsule 0   • Vitamin C 500 MG Oral Tab Take 500 mg by mouth daily. • Calcium Citrate (CITRACAL OR) Take  by mouth.      • vitamin E 1000 Constitutional: She appears well-developed and well-nourished. No distress. Genitourinary:       There is no lesion on the right labia. There is no lesion on the left labia. No vaginal discharge, erythema, tenderness or bleeding.    No erythema, tender

## 2020-01-10 ENCOUNTER — PATIENT MESSAGE (OUTPATIENT)
Dept: FAMILY MEDICINE CLINIC | Facility: CLINIC | Age: 60
End: 2020-01-10

## 2020-01-10 RX ORDER — LEVOTHYROXINE SODIUM 0.1 MG/1
100 TABLET ORAL
Qty: 90 TABLET | Refills: 1 | Status: SHIPPED | OUTPATIENT
Start: 2020-01-10 | End: 2020-10-06

## 2020-01-10 NOTE — TELEPHONE ENCOUNTER
From: Belen Oneill  To: Unknown DEEPTHI Dewitt  Sent: 1/10/2020 12:44 PM CST  Subject: Prescription Question    [facility] Suárez is telling me that they haven’t received the prescriptions for one of the creams or synthroid refill yet.  Apparently they reache

## 2020-01-30 ENCOUNTER — OFFICE VISIT (OUTPATIENT)
Dept: FAMILY MEDICINE CLINIC | Facility: CLINIC | Age: 60
End: 2020-01-30
Payer: COMMERCIAL

## 2020-01-30 VITALS
SYSTOLIC BLOOD PRESSURE: 122 MMHG | BODY MASS INDEX: 24.64 KG/M2 | HEIGHT: 67 IN | HEART RATE: 73 BPM | WEIGHT: 157 LBS | TEMPERATURE: 98 F | OXYGEN SATURATION: 97 % | DIASTOLIC BLOOD PRESSURE: 80 MMHG | RESPIRATION RATE: 16 BRPM

## 2020-01-30 DIAGNOSIS — K59.09 CHRONIC CONSTIPATION: ICD-10-CM

## 2020-01-30 DIAGNOSIS — K64.4 EXTERNAL HEMORRHOIDS: Primary | ICD-10-CM

## 2020-01-30 DIAGNOSIS — N89.8 VAGINAL DRYNESS: ICD-10-CM

## 2020-01-30 DIAGNOSIS — K64.8 INTERNAL HEMORRHOIDS: ICD-10-CM

## 2020-01-30 PROCEDURE — 99214 OFFICE O/P EST MOD 30 MIN: CPT | Performed by: FAMILY MEDICINE

## 2020-01-30 RX ORDER — LIDOCAINE HYDROCHLORIDE AND HYDROCORTISONE ACETATE 30; 25 MG/G; MG/G
1 GEL RECTAL 2 TIMES DAILY
Qty: 1 KIT | Refills: 0 | Status: SHIPPED | OUTPATIENT
Start: 2020-01-30 | End: 2021-05-25 | Stop reason: ALTCHOICE

## 2020-01-30 RX ORDER — FOLIC ACID 1 MG/1
TABLET ORAL DAILY
COMMUNITY
End: 2021-05-25 | Stop reason: ALTCHOICE

## 2020-01-30 NOTE — PROGRESS NOTES
HPI:   Isabell Galarza is a 63year old female who presents with recurrent internal hemorrhoids, vaginal dryness and chronic constipation     S/p surgical eval for hemorrhoids, surgery was deferred after banding did not help   Pt is having pressure and bl (ZOFRAN ODT) 8 MG Oral Tablet Dispersible Take 1 tablet (8 mg total) by mouth every 8 (eight) hours as needed for Nausea.  (Patient not taking: Reported on 1/30/2020 ) 20 tablet 1      Past Medical History:   Diagnosis Date   • Acute frontal sinusitis    • BS's,no masses, HSM or tenderness  : external genitalia - no inguinal LAD, no lesions.     RECTAL:good rectal tone,no mass, brown stool, stool is OB negative, non thrombosed 2 external hemorrhoids, several small internal hemorrhoids   MUSCULOSKELETAL: kemar

## 2020-03-05 ENCOUNTER — OFFICE VISIT (OUTPATIENT)
Dept: FAMILY MEDICINE CLINIC | Facility: CLINIC | Age: 60
End: 2020-03-05
Payer: COMMERCIAL

## 2020-03-05 VITALS
DIASTOLIC BLOOD PRESSURE: 88 MMHG | WEIGHT: 157 LBS | RESPIRATION RATE: 17 BRPM | HEIGHT: 67 IN | HEART RATE: 70 BPM | BODY MASS INDEX: 24.64 KG/M2 | TEMPERATURE: 98 F | SYSTOLIC BLOOD PRESSURE: 128 MMHG | OXYGEN SATURATION: 98 %

## 2020-03-05 DIAGNOSIS — R23.2 HOT FLASHES: ICD-10-CM

## 2020-03-05 DIAGNOSIS — N64.4 MASTODYNIA: Primary | ICD-10-CM

## 2020-03-05 DIAGNOSIS — G47.09 OTHER INSOMNIA: ICD-10-CM

## 2020-03-05 PROCEDURE — 99214 OFFICE O/P EST MOD 30 MIN: CPT | Performed by: FAMILY MEDICINE

## 2020-03-05 RX ORDER — TRAZODONE HYDROCHLORIDE 50 MG/1
50 TABLET ORAL NIGHTLY
Qty: 30 TABLET | Refills: 0 | Status: SHIPPED | OUTPATIENT
Start: 2020-03-05 | End: 2020-03-30

## 2020-03-05 NOTE — PROGRESS NOTES
HPI:   Irma Sandhu is a 63year old female who presents with mood changes, hot flashes and breast tenderness    Pt c/o intermittent breast tenderness  Mammogram UTD  + family h/o breast cancer     Pt reports she has been having hot flashes   Pt does n Oil-Cholecalciferol (FISH OIL + D3 OR) Take 200 mg by mouth. • hydrocortisone 2.5 % External Ointment Apply 1 Application topically 2 (two) times daily.  28 g 0      Past Medical History:   Diagnosis Date   • Acute frontal sinusitis    • Acute maxilla are intact,motor and sensory are grossly intact  PSYCH: normal mood and affect good eye contact appropriate     ASSESSMENT AND PLAN:   Adele Cooks is a 63year old female who presents with  1.  Mastodynia    - KEYUR KYLAH 2D+3D DIAGNOSTIC KEYUR ESPINO (CPT=

## 2020-03-05 NOTE — PROGRESS NOTES
HPI:   Amaury Marin is a 63year old female who presents with recurrent internal hemorrhoids, vaginal dryness and chronic constipation     S/p surgical eval for hemorrhoids, surgery was deferred after banding did not help   Pt is having pressure and bl (MULTIVITAMIN OR) Take  by mouth. • Cholecalciferol (VITAMIN D) 1000 UNITS Oral Tab Take  by mouth. • Fish Oil-Cholecalciferol (FISH OIL + D3 OR) Take 200 mg by mouth.           Past Medical History:   Diagnosis Date   • Acute frontal sinusitis    • inguinal LAD, no lesions.     RECTAL:good rectal tone,no mass, brown stool, stool is OB negative, non thrombosed 2 external hemorrhoids, several small internal hemorrhoids   MUSCULOSKELETAL: back is not tender,FROM of the back  EXTREMITIES: no cyanosis, clu

## 2020-03-09 ENCOUNTER — HOSPITAL ENCOUNTER (OUTPATIENT)
Dept: MAMMOGRAPHY | Age: 60
Discharge: HOME | End: 2020-03-09
Attending: FAMILY MEDICINE
Payer: COMMERCIAL

## 2020-03-09 ENCOUNTER — HOSPITAL ENCOUNTER (OUTPATIENT)
Dept: ULTRASOUND IMAGING | Age: 60
Discharge: HOME | End: 2020-03-09
Attending: FAMILY MEDICINE
Payer: COMMERCIAL

## 2020-03-09 DIAGNOSIS — N64.4 MASTODYNIA: ICD-10-CM

## 2020-03-09 PROCEDURE — 77062 BREAST TOMOSYNTHESIS BI: CPT | Performed by: FAMILY MEDICINE

## 2020-03-09 PROCEDURE — 76642 ULTRASOUND BREAST LIMITED: CPT | Performed by: FAMILY MEDICINE

## 2020-03-09 PROCEDURE — 77066 DX MAMMO INCL CAD BI: CPT | Performed by: FAMILY MEDICINE

## 2020-03-30 DIAGNOSIS — G47.09 OTHER INSOMNIA: ICD-10-CM

## 2020-03-30 RX ORDER — TRAZODONE HYDROCHLORIDE 50 MG/1
TABLET ORAL
Qty: 30 TABLET | Refills: 0 | Status: SHIPPED | OUTPATIENT
Start: 2020-03-30 | End: 2020-04-02

## 2020-04-02 ENCOUNTER — PATIENT MESSAGE (OUTPATIENT)
Dept: FAMILY MEDICINE CLINIC | Facility: CLINIC | Age: 60
End: 2020-04-02

## 2020-04-02 NOTE — TELEPHONE ENCOUNTER
From: Irma Sandhu  To: Malon Crigler, DO  Sent: 4/2/2020 8:08 AM CDT  Subject: Prescription Question    Hi,  [facility] keeps refilling a prescription for Trazodone and I don’t take that. Brenda told their pharmacist twice but they keep refilling it.  Can you so

## 2020-04-27 DIAGNOSIS — G47.09 OTHER INSOMNIA: ICD-10-CM

## 2020-04-28 ENCOUNTER — PATIENT MESSAGE (OUTPATIENT)
Dept: FAMILY MEDICINE CLINIC | Facility: CLINIC | Age: 60
End: 2020-04-28

## 2020-04-28 RX ORDER — TRAZODONE HYDROCHLORIDE 50 MG/1
50 TABLET ORAL NIGHTLY
Qty: 30 TABLET | Refills: 0 | Status: SHIPPED | OUTPATIENT
Start: 2020-04-28 | End: 2020-04-28

## 2020-04-28 NOTE — TELEPHONE ENCOUNTER
From: Berry Romero  To: Mich Durbin MD  Sent: 4/28/2020 8:57 AM CDT  Subject: Prescription Question    Hi Dr. Tammy Erazo,  I hope you’re doing well! I’m following up on our last emails regarding the Trazadone prescription.  I just received an email fro

## 2020-08-25 ENCOUNTER — LAB ENCOUNTER (OUTPATIENT)
Dept: LAB | Age: 60
End: 2020-08-25
Attending: FAMILY MEDICINE
Payer: COMMERCIAL

## 2020-08-25 DIAGNOSIS — E03.9 HYPOTHYROIDISM, UNSPECIFIED TYPE: ICD-10-CM

## 2020-08-25 LAB
T4 FREE SERPL-MCNC: 1.5 NG/DL (ref 0.8–1.7)
TSI SER-ACNC: 0.3 MIU/ML (ref 0.36–3.74)

## 2020-08-25 PROCEDURE — 36415 COLL VENOUS BLD VENIPUNCTURE: CPT | Performed by: FAMILY MEDICINE

## 2020-08-25 PROCEDURE — 84443 ASSAY THYROID STIM HORMONE: CPT | Performed by: FAMILY MEDICINE

## 2020-08-25 PROCEDURE — 84439 ASSAY OF FREE THYROXINE: CPT | Performed by: FAMILY MEDICINE

## 2020-10-06 ENCOUNTER — OFFICE VISIT (OUTPATIENT)
Dept: FAMILY MEDICINE CLINIC | Facility: CLINIC | Age: 60
End: 2020-10-06
Payer: COMMERCIAL

## 2020-10-06 VITALS
WEIGHT: 155 LBS | DIASTOLIC BLOOD PRESSURE: 78 MMHG | HEIGHT: 67 IN | SYSTOLIC BLOOD PRESSURE: 132 MMHG | HEART RATE: 77 BPM | TEMPERATURE: 97 F | BODY MASS INDEX: 24.33 KG/M2

## 2020-10-06 DIAGNOSIS — Z00.00 ANNUAL PHYSICAL EXAM: ICD-10-CM

## 2020-10-06 DIAGNOSIS — J47.9 BRONCHIECTASIS WITHOUT COMPLICATION (HCC): ICD-10-CM

## 2020-10-06 DIAGNOSIS — J01.90 ACUTE NON-RECURRENT SINUSITIS, UNSPECIFIED LOCATION: ICD-10-CM

## 2020-10-06 DIAGNOSIS — Z01.419 WELL WOMAN EXAM WITH ROUTINE GYNECOLOGICAL EXAM: Primary | ICD-10-CM

## 2020-10-06 DIAGNOSIS — Z12.31 ENCOUNTER FOR SCREENING MAMMOGRAM FOR HIGH-RISK PATIENT: ICD-10-CM

## 2020-10-06 DIAGNOSIS — Z13.820 SCREENING FOR OSTEOPOROSIS: ICD-10-CM

## 2020-10-06 DIAGNOSIS — F41.9 ANXIETY: ICD-10-CM

## 2020-10-06 DIAGNOSIS — E03.9 HYPOTHYROIDISM, UNSPECIFIED TYPE: ICD-10-CM

## 2020-10-06 PROCEDURE — 3075F SYST BP GE 130 - 139MM HG: CPT | Performed by: FAMILY MEDICINE

## 2020-10-06 PROCEDURE — 99396 PREV VISIT EST AGE 40-64: CPT | Performed by: FAMILY MEDICINE

## 2020-10-06 PROCEDURE — 3008F BODY MASS INDEX DOCD: CPT | Performed by: FAMILY MEDICINE

## 2020-10-06 PROCEDURE — 90471 IMMUNIZATION ADMIN: CPT | Performed by: FAMILY MEDICINE

## 2020-10-06 PROCEDURE — 3078F DIAST BP <80 MM HG: CPT | Performed by: FAMILY MEDICINE

## 2020-10-06 PROCEDURE — 90686 IIV4 VACC NO PRSV 0.5 ML IM: CPT | Performed by: FAMILY MEDICINE

## 2020-10-06 RX ORDER — LEVOTHYROXINE SODIUM 0.1 MG/1
100 TABLET ORAL
Qty: 90 TABLET | Refills: 1 | Status: SHIPPED | OUTPATIENT
Start: 2020-10-06 | End: 2021-06-07

## 2020-10-06 RX ORDER — FLUTICASONE PROPIONATE 50 MCG
2 SPRAY, SUSPENSION (ML) NASAL NIGHTLY
Qty: 1 BOTTLE | Refills: 0 | Status: SHIPPED | OUTPATIENT
Start: 2020-10-06 | End: 2020-10-29

## 2020-10-06 RX ORDER — ALPRAZOLAM 0.5 MG/1
0.5 TABLET ORAL 2 TIMES DAILY PRN
Qty: 30 TABLET | Refills: 1 | Status: SHIPPED | OUTPATIENT
Start: 2020-10-06 | End: 2021-05-25 | Stop reason: ALTCHOICE

## 2020-10-06 RX ORDER — AZITHROMYCIN 250 MG/1
TABLET, FILM COATED ORAL
Qty: 6 TABLET | Refills: 0 | Status: SHIPPED | OUTPATIENT
Start: 2020-10-06 | End: 2020-10-27 | Stop reason: ALTCHOICE

## 2020-10-06 RX ORDER — ONDANSETRON 8 MG/1
8 TABLET, ORALLY DISINTEGRATING ORAL EVERY 8 HOURS PRN
Qty: 20 TABLET | Refills: 1 | Status: SHIPPED | OUTPATIENT
Start: 2020-10-06 | End: 2021-05-25 | Stop reason: ALTCHOICE

## 2020-10-06 NOTE — PROGRESS NOTES
HPI:   Amy Acosta is a 62year old female who presents for a complete physical exam.     Wt Readings from Last 6 Encounters:  10/06/20 : 155 lb (70.3 kg)  03/05/20 : 157 lb (71.2 kg)  01/30/20 : 157 lb (71.2 kg)  01/03/20 : 155 lb (70.3 kg)  12/10/19 (CITRACAL OR) Take  by mouth. • vitamin E 1000 UNITS Oral Cap Take 1,000 Units by mouth daily. • GLUCOSAMINE CHONDROITIN COMPLX OR Take  by mouth. • Multiple Vitamins-Minerals (MULTIVITAMIN OR) Take  by mouth.      • Cholecalciferol (VITAMIN D) kg/m².   GENERAL: alert and oriented X 3, well developed, well nourished,in no apparent distress  CARDIO: RRR without murmur  LUNGS: clear to auscultation  NECK: supple,no adenopathy,no thyromegaly  HEENT: atraumatic, normocephalic,ears and throat are charleen 90 tablet; Refill: 1    8. Acute non-recurrent sinusitis, unspecified location    - azithromycin 250 MG Oral Tab; Take two tablets by mouth today, then one daily. Dispense: 6 tablet;  Refill: 0    Discussed  diet, exercise,calcium, vitamin D, fish oil and

## 2020-10-08 ENCOUNTER — LAB ENCOUNTER (OUTPATIENT)
Dept: LAB | Age: 60
End: 2020-10-08
Attending: FAMILY MEDICINE
Payer: COMMERCIAL

## 2020-10-08 DIAGNOSIS — Z00.00 ANNUAL PHYSICAL EXAM: ICD-10-CM

## 2020-10-08 PROCEDURE — 80061 LIPID PANEL: CPT | Performed by: FAMILY MEDICINE

## 2020-10-08 PROCEDURE — 84439 ASSAY OF FREE THYROXINE: CPT | Performed by: FAMILY MEDICINE

## 2020-10-08 PROCEDURE — 36415 COLL VENOUS BLD VENIPUNCTURE: CPT | Performed by: FAMILY MEDICINE

## 2020-10-08 PROCEDURE — 82306 VITAMIN D 25 HYDROXY: CPT | Performed by: FAMILY MEDICINE

## 2020-10-08 PROCEDURE — 80050 GENERAL HEALTH PANEL: CPT | Performed by: FAMILY MEDICINE

## 2020-10-08 PROCEDURE — 86787 VARICELLA-ZOSTER ANTIBODY: CPT | Performed by: FAMILY MEDICINE

## 2020-10-08 PROCEDURE — 82607 VITAMIN B-12: CPT | Performed by: FAMILY MEDICINE

## 2020-10-12 ENCOUNTER — TELEPHONE (OUTPATIENT)
Dept: FAMILY MEDICINE CLINIC | Facility: CLINIC | Age: 60
End: 2020-10-12

## 2020-10-12 NOTE — TELEPHONE ENCOUNTER
Pt wanted to let Dr Jessica Newsome know that she took first two days of Daivd Sal for ear infx and it upset her stomach.   She was going to d/c it, but she said she had a very stressful weekend and thinks that also affected her stomach, so she's going to do one more da

## 2020-10-13 ENCOUNTER — TELEPHONE (OUTPATIENT)
Dept: FAMILY MEDICINE CLINIC | Facility: CLINIC | Age: 60
End: 2020-10-13

## 2020-10-14 ENCOUNTER — OFFICE VISIT (OUTPATIENT)
Dept: FAMILY MEDICINE CLINIC | Facility: CLINIC | Age: 60
End: 2020-10-14
Payer: COMMERCIAL

## 2020-10-14 VITALS
HEIGHT: 67 IN | HEART RATE: 73 BPM | SYSTOLIC BLOOD PRESSURE: 122 MMHG | TEMPERATURE: 98 F | WEIGHT: 158.38 LBS | BODY MASS INDEX: 24.86 KG/M2 | DIASTOLIC BLOOD PRESSURE: 80 MMHG | OXYGEN SATURATION: 98 % | RESPIRATION RATE: 16 BRPM

## 2020-10-14 DIAGNOSIS — K64.5 EXTERNAL THROMBOSED HEMORRHOIDS: Primary | ICD-10-CM

## 2020-10-14 PROCEDURE — 3074F SYST BP LT 130 MM HG: CPT | Performed by: PHYSICIAN ASSISTANT

## 2020-10-14 PROCEDURE — 3008F BODY MASS INDEX DOCD: CPT | Performed by: PHYSICIAN ASSISTANT

## 2020-10-14 PROCEDURE — 99213 OFFICE O/P EST LOW 20 MIN: CPT | Performed by: PHYSICIAN ASSISTANT

## 2020-10-14 PROCEDURE — 3079F DIAST BP 80-89 MM HG: CPT | Performed by: PHYSICIAN ASSISTANT

## 2020-10-15 NOTE — PROGRESS NOTES
HPI:    Patient ID: Adele Cooks is a 62year old female. HPI   Patient presents today c/o painful, swollen hemorrhoid the last few days. She has long h/o hemorrhoids and intermittent bleeding.  She has h/o thrombosed hemorrhoid and this feels simila 1 Application topically 2 (two) times daily. 28 g 0   • Fluticasone Propionate 50 MCG/ACT Nasal Suspension 2 sprays by Each Nare route daily.  1 Bottle 0   • clindamycin HCl 150 MG Oral Cap TAKE 4 CAPSULES BY MOUTH ONE HOUR PRIOR TO DENTAL APPT  0   • gabap Disp Refills   • Hydrocortisone Acetate (ANUSOL-HC) 25 MG Rectal Suppos 28 suppository 0     Sig: Place 1 suppository (25 mg total) rectally 2 (two) times daily for 14 days.    • hydrocortisone 2.5 % External Ointment 30 g 2     Sig: Apply 1 Application top

## 2020-10-16 ENCOUNTER — OFFICE VISIT (OUTPATIENT)
Dept: SURGERY | Facility: CLINIC | Age: 60
End: 2020-10-16
Payer: COMMERCIAL

## 2020-10-16 VITALS — HEIGHT: 67 IN | TEMPERATURE: 98 F | BODY MASS INDEX: 23.7 KG/M2 | WEIGHT: 151 LBS

## 2020-10-16 DIAGNOSIS — K64.8 INTERNAL HEMORRHOIDS: Primary | ICD-10-CM

## 2020-10-16 DIAGNOSIS — R19.7 DIARRHEA, UNSPECIFIED TYPE: ICD-10-CM

## 2020-10-16 DIAGNOSIS — K64.5 THROMBOSED EXTERNAL HEMORRHOID: ICD-10-CM

## 2020-10-16 PROCEDURE — 99244 OFF/OP CNSLTJ NEW/EST MOD 40: CPT | Performed by: SURGERY

## 2020-10-16 PROCEDURE — 3008F BODY MASS INDEX DOCD: CPT | Performed by: SURGERY

## 2020-10-16 PROCEDURE — 46320 REMOVAL OF HEMORRHOID CLOT: CPT | Performed by: SURGERY

## 2020-10-16 NOTE — H&P
New Patient Visit Note       Active Problems      1. Internal hemorrhoids    2. Thrombosed external hemorrhoid    3. Diarrhea, unspecified type        Chief Complaint   Patient presents with:  Hemorrhoids: Was seen at her PCP for Thrombosed Hemorhoid.  C/O mouth every 8 (eight) hours as needed for Nausea., Disp: 20 tablet, Rfl: 1  •  Fluticasone Propionate 50 MCG/ACT Nasal Suspension, 2 sprays by Nasal route nightly., Disp: 1 Bottle, Rfl: 0  •  azithromycin 250 MG Oral Tab, Take two tablets by mouth today, t Negative for cough, shortness of breath and wheezing. Cardiovascular: Negative for chest pain and palpitations. Gastrointestinal: Positive for rectal pain.  Negative for abdominal distention, abdominal pain, blood in stool, constipation, diarrhea, naus pain several days ago. She has had one prior episode of thrombosed hemorrhoid and this was similar to that. She also had a previous attempt at internal hemorrhoidal banding which was quite traumatic and unsuccessful.     On physical examination in the lef

## 2020-10-16 NOTE — PROCEDURES
Procedure note    Date of procedure-October 16, 2020    Preoperative diagnosis-acutely thrombosed external hemorrhoid left lateral quadrant    Indication for procedure-perirectal pain    Postoperative diagnosis-same    Procedure-   incision, drainage, exci

## 2020-10-27 ENCOUNTER — NURSE ONLY (OUTPATIENT)
Dept: FAMILY MEDICINE CLINIC | Facility: CLINIC | Age: 60
End: 2020-10-27
Payer: COMMERCIAL

## 2020-10-27 ENCOUNTER — OFFICE VISIT (OUTPATIENT)
Dept: SURGERY | Facility: CLINIC | Age: 60
End: 2020-10-27
Payer: COMMERCIAL

## 2020-10-27 ENCOUNTER — LAB ENCOUNTER (OUTPATIENT)
Dept: LAB | Age: 60
End: 2020-10-27
Attending: FAMILY MEDICINE
Payer: COMMERCIAL

## 2020-10-27 VITALS — TEMPERATURE: 98 F | SYSTOLIC BLOOD PRESSURE: 124 MMHG | HEART RATE: 76 BPM | DIASTOLIC BLOOD PRESSURE: 81 MMHG

## 2020-10-27 DIAGNOSIS — E87.6 HYPOKALEMIA: ICD-10-CM

## 2020-10-27 DIAGNOSIS — K64.5 THROMBOSED EXTERNAL HEMORRHOID: Primary | ICD-10-CM

## 2020-10-27 DIAGNOSIS — K64.8 INTERNAL HEMORRHOIDS: ICD-10-CM

## 2020-10-27 PROCEDURE — 3074F SYST BP LT 130 MM HG: CPT | Performed by: SURGERY

## 2020-10-27 PROCEDURE — 3079F DIAST BP 80-89 MM HG: CPT | Performed by: SURGERY

## 2020-10-27 PROCEDURE — 90732 PPSV23 VACC 2 YRS+ SUBQ/IM: CPT | Performed by: FAMILY MEDICINE

## 2020-10-27 PROCEDURE — 99213 OFFICE O/P EST LOW 20 MIN: CPT | Performed by: SURGERY

## 2020-10-27 PROCEDURE — 90471 IMMUNIZATION ADMIN: CPT | Performed by: FAMILY MEDICINE

## 2020-10-27 PROCEDURE — 36415 COLL VENOUS BLD VENIPUNCTURE: CPT | Performed by: FAMILY MEDICINE

## 2020-10-27 PROCEDURE — 84132 ASSAY OF SERUM POTASSIUM: CPT | Performed by: FAMILY MEDICINE

## 2020-10-27 NOTE — PROGRESS NOTES
Follow Up Visit Note       Active Problems      1. Thrombosed external hemorrhoid    2.  Internal hemorrhoids          Chief Complaint   Patient presents with:  Anal Problem: CONTINUING CARE AFTER I&D OF THROMBOSED YASMEEN IN OFFICE ON 10/16      Allergies  Mi Disp: , Rfl:   •  Lidocaine-Hydrocortisone Ace 3-2.5 % Rectal Kit, Place 1 Application rectally 2 (two) times daily. , Disp: 1 kit, Rfl: 0  •  hydrocortisone 2.5 % External Ointment, Apply 1 Application topically 2 (two) times daily. , Disp: 28 g, Rfl: 0  • bruise/bleed easily. Psychiatric/Behavioral: Negative for confusion, hallucinations and sleep disturbance.         Physical Findings   /81   Pulse 76   Temp 97.5 °F (36.4 °C)   Physical Exam   Constitutional: She is oriented to person, place, and ti Nimo Mattson is pleased with the results of incision and drainage of her thrombosed external hemorrhoid. No indication for further intervention at this time as Nimo Mattson is essentially asymptomatic.   She is to follow-up with GI for routine screening colono

## 2020-10-29 RX ORDER — FLUTICASONE PROPIONATE 50 MCG
2 SPRAY, SUSPENSION (ML) NASAL NIGHTLY
Qty: 1 BOTTLE | Refills: 3 | Status: SHIPPED | OUTPATIENT
Start: 2020-10-29 | End: 2021-08-07

## 2020-12-01 RX ORDER — AZITHROMYCIN 250 MG/1
TABLET, FILM COATED ORAL
Qty: 6 TABLET | Refills: 0 | Status: SHIPPED | OUTPATIENT
Start: 2020-12-01 | End: 2020-12-06

## 2020-12-31 ENCOUNTER — TELEPHONE (OUTPATIENT)
Dept: FAMILY MEDICINE CLINIC | Facility: CLINIC | Age: 60
End: 2020-12-31

## 2021-01-04 ENCOUNTER — NURSE ONLY (OUTPATIENT)
Dept: FAMILY MEDICINE CLINIC | Facility: CLINIC | Age: 61
End: 2021-01-04
Payer: COMMERCIAL

## 2021-01-04 PROCEDURE — 90471 IMMUNIZATION ADMIN: CPT | Performed by: FAMILY MEDICINE

## 2021-01-04 PROCEDURE — 90715 TDAP VACCINE 7 YRS/> IM: CPT | Performed by: FAMILY MEDICINE

## 2021-01-19 ENCOUNTER — PATIENT MESSAGE (OUTPATIENT)
Dept: FAMILY MEDICINE CLINIC | Facility: CLINIC | Age: 61
End: 2021-01-19

## 2021-01-20 RX ORDER — ALBUTEROL SULFATE 90 UG/1
2 AEROSOL, METERED RESPIRATORY (INHALATION) EVERY 6 HOURS PRN
Qty: 1 INHALER | Refills: 0 | Status: SHIPPED | OUTPATIENT
Start: 2021-01-20 | End: 2021-07-09

## 2021-01-20 NOTE — TELEPHONE ENCOUNTER
From: Isabell Galarza  To: Bree Joaquin DO  Sent: 1/19/2021 11:05 PM CST  Subject: Non-Urgent Medical Question    Hi Dr. Daniel Luu,  I hope you and your family are doing well.  When I saw you in November for my physical, we talked about refilling my Albuter

## 2021-01-20 NOTE — TELEPHONE ENCOUNTER
Last WWE 10/2/2020.  5. Bronchiectasis without complication ([facility] [address]  - PULMONARY - INTERNAL  No pulmo notes in chart. Dr. Trudi Tovar, are you willing to fill albuterol inhaler for patient?      Not rx by you in the past.  (medication pended below with alerts

## 2021-03-26 ENCOUNTER — ORDER TRANSCRIPTION (OUTPATIENT)
Dept: ADMINISTRATIVE | Facility: HOSPITAL | Age: 61
End: 2021-03-26

## 2021-03-26 DIAGNOSIS — Z01.818 PREOP EXAMINATION: ICD-10-CM

## 2021-03-26 DIAGNOSIS — Z11.59 SCREENING FOR VIRAL DISEASE: ICD-10-CM

## 2021-03-26 DIAGNOSIS — R06.00 DYSPNEA ON EXERTION: Primary | ICD-10-CM

## 2021-04-07 ENCOUNTER — LAB ENCOUNTER (OUTPATIENT)
Dept: LAB | Facility: HOSPITAL | Age: 61
End: 2021-04-07
Attending: INTERNAL MEDICINE
Payer: COMMERCIAL

## 2021-04-07 DIAGNOSIS — Z11.59 SCREENING FOR VIRAL DISEASE: ICD-10-CM

## 2021-04-07 DIAGNOSIS — Z01.818 PREOP EXAMINATION: ICD-10-CM

## 2021-04-07 DIAGNOSIS — R06.00 DYSPNEA ON EXERTION: ICD-10-CM

## 2021-04-10 ENCOUNTER — HOSPITAL ENCOUNTER (OUTPATIENT)
Dept: GENERAL RADIOLOGY | Facility: HOSPITAL | Age: 61
Discharge: HOME | End: 2021-04-10
Attending: INTERNAL MEDICINE
Payer: COMMERCIAL

## 2021-04-10 ENCOUNTER — RT VISIT (OUTPATIENT)
Dept: RESPIRATORY THERAPY | Facility: HOSPITAL | Age: 61
End: 2021-04-10
Attending: INTERNAL MEDICINE
Payer: COMMERCIAL

## 2021-04-10 DIAGNOSIS — R06.00 DYSPNEA ON EXERTION: ICD-10-CM

## 2021-04-10 PROCEDURE — 94010 BREATHING CAPACITY TEST: CPT

## 2021-04-10 PROCEDURE — 94726 PLETHYSMOGRAPHY LUNG VOLUMES: CPT

## 2021-04-10 PROCEDURE — 94729 DIFFUSING CAPACITY: CPT

## 2021-04-10 PROCEDURE — 71046 X-RAY EXAM CHEST 2 VIEWS: CPT | Performed by: INTERNAL MEDICINE

## 2021-04-12 NOTE — PROCEDURES
Findings:  FEV1 is 2.79L, 104% predicted. FVC is 3.35L, 98% predicted. FEV1/ FVC ratio is 0.83. The flow-volume loop demonstrates a normal pattern. The TLC is 7.00L, 133% predicted. The residual volume 3.40L, 172% predicted.   The diffusion capacity is

## 2021-05-03 ENCOUNTER — HOSPITAL ENCOUNTER (OUTPATIENT)
Dept: MAMMOGRAPHY | Age: 61
Discharge: HOME | End: 2021-05-03
Attending: FAMILY MEDICINE
Payer: COMMERCIAL

## 2021-05-03 ENCOUNTER — HOSPITAL ENCOUNTER (OUTPATIENT)
Dept: BONE DENSITY | Age: 61
Discharge: HOME | End: 2021-05-03
Attending: FAMILY MEDICINE
Payer: COMMERCIAL

## 2021-05-03 DIAGNOSIS — Z12.31 ENCOUNTER FOR SCREENING MAMMOGRAM FOR HIGH-RISK PATIENT: ICD-10-CM

## 2021-05-03 DIAGNOSIS — Z13.820 SCREENING FOR OSTEOPOROSIS: ICD-10-CM

## 2021-05-03 PROCEDURE — 77067 SCR MAMMO BI INCL CAD: CPT | Performed by: FAMILY MEDICINE

## 2021-05-03 PROCEDURE — 77080 DXA BONE DENSITY AXIAL: CPT | Performed by: FAMILY MEDICINE

## 2021-05-03 PROCEDURE — 77063 BREAST TOMOSYNTHESIS BI: CPT | Performed by: FAMILY MEDICINE

## 2021-05-04 ENCOUNTER — TELEMEDICINE (OUTPATIENT)
Dept: FAMILY MEDICINE CLINIC | Facility: CLINIC | Age: 61
End: 2021-05-04

## 2021-05-04 ENCOUNTER — TELEPHONE (OUTPATIENT)
Dept: FAMILY MEDICINE CLINIC | Facility: CLINIC | Age: 61
End: 2021-05-04

## 2021-05-04 DIAGNOSIS — M81.8 OTHER OSTEOPOROSIS WITHOUT CURRENT PATHOLOGICAL FRACTURE: Primary | ICD-10-CM

## 2021-05-04 DIAGNOSIS — E03.9 HYPOTHYROIDISM, UNSPECIFIED TYPE: ICD-10-CM

## 2021-05-04 DIAGNOSIS — J47.9 BRONCHIECTASIS WITHOUT COMPLICATION (HCC): ICD-10-CM

## 2021-05-04 DIAGNOSIS — R91.1 LUNG NODULE: ICD-10-CM

## 2021-05-04 PROCEDURE — 99213 OFFICE O/P EST LOW 20 MIN: CPT | Performed by: FAMILY MEDICINE

## 2021-05-04 NOTE — PROGRESS NOTES
This visit is conducted using Telemedicine with live, interactive video and audio.       Telehealth outside of [address] Verbal Consent   I conducted a telehealth visit with Michael Kenyon today, 05/04/21, which was completed using two-way, real PROCEDURE:  XR DEXA BONE DENSITOMETRY (CPT=77080)       COMPARISON:  [address], XR, XR DEXA BONE DENSITOMETRY (CPT=77080), 9/15/2018, 8:05 AM.  [address], XR, XR DEXA BONE DENSITOMETRY (CPT=77080), 8/04/2016, 8:39 AM.       INDICATIONS:    V09.701 daily as needed for Sleep or Anxiety. 30 tablet 1   • Levothyroxine Sodium 100 MCG Oral Tab Take 1 tablet (100 mcg total) by mouth once daily.  90 tablet 1   • ondansetron (ZOFRAN ODT) 8 MG Oral Tablet Dispersible Take 1 tablet (8 mg total) by mouth every 8 • Hypertension Sister    • Thyroid Disorder Sister    • Cancer Sister         basal cell skin   • Breast Cancer Mother [de-identified]      Social History:   Social History    Tobacco Use      Smoking status: Never Smoker      Smokeless tobacco: Never Used    Alcohol

## 2021-05-04 NOTE — TELEPHONE ENCOUNTER
Called pt. She is frustrated with dexa results because of all of the things she does proactively for her health.      Pt gave up coffee, eats healthy diet, drinks almond milk, works out 5-6 days/week, does weight resistance training, takes OTC vit D an calt

## 2021-05-18 ENCOUNTER — LAB ENCOUNTER (OUTPATIENT)
Dept: LAB | Age: 61
End: 2021-05-18
Attending: FAMILY MEDICINE
Payer: COMMERCIAL

## 2021-05-18 DIAGNOSIS — E03.9 HYPOTHYROIDISM, UNSPECIFIED TYPE: ICD-10-CM

## 2021-05-18 PROCEDURE — 84439 ASSAY OF FREE THYROXINE: CPT | Performed by: FAMILY MEDICINE

## 2021-05-18 PROCEDURE — 84443 ASSAY THYROID STIM HORMONE: CPT | Performed by: FAMILY MEDICINE

## 2021-05-29 ENCOUNTER — HOSPITAL ENCOUNTER (OUTPATIENT)
Dept: CT IMAGING | Age: 61
Discharge: HOME | End: 2021-05-29
Attending: INTERNAL MEDICINE
Payer: COMMERCIAL

## 2021-05-29 DIAGNOSIS — R94.2 ABNORMAL PULMONARY FUNCTION TEST: ICD-10-CM

## 2021-05-29 DIAGNOSIS — J47.9 BRONCHIECTASIS WITHOUT COMPLICATION (HCC): ICD-10-CM

## 2021-05-29 PROCEDURE — 71250 CT THORAX DX C-: CPT | Performed by: INTERNAL MEDICINE

## 2021-06-01 NOTE — PROGRESS NOTES
Reviewed CT images and called patient to discuss. LMTCB.   Can set up a time to discuss at 3pm today or 1040 on Friday

## 2021-06-07 DIAGNOSIS — E03.9 HYPOTHYROIDISM, UNSPECIFIED TYPE: ICD-10-CM

## 2021-06-07 RX ORDER — LEVOTHYROXINE SODIUM 0.1 MG/1
100 TABLET ORAL DAILY
Qty: 90 TABLET | Refills: 0 | Status: SHIPPED | OUTPATIENT
Start: 2021-06-07 | End: 2021-08-07

## 2021-06-07 NOTE — TELEPHONE ENCOUNTER
Next Appt:    With [facility] Mary Grace DO)  10/11/2021 at 7:30 AM    LV 5-4-21    LR 5-20-21

## 2021-06-11 RX ORDER — LEVOTHYROXINE SODIUM 88 UG/1
88 TABLET ORAL
Qty: 30 TABLET | Refills: 1 | OUTPATIENT
Start: 2021-06-11

## 2021-06-21 RX ORDER — LEVOTHYROXINE SODIUM 88 UG/1
88 TABLET ORAL
Qty: 30 TABLET | Refills: 1 | Status: SHIPPED | OUTPATIENT
Start: 2021-06-21 | End: 2021-07-01

## 2021-06-28 ENCOUNTER — OFFICE VISIT (OUTPATIENT)
Dept: RHEUMATOLOGY | Facility: CLINIC | Age: 61
End: 2021-06-28
Payer: COMMERCIAL

## 2021-06-28 VITALS
SYSTOLIC BLOOD PRESSURE: 140 MMHG | BODY MASS INDEX: 24.78 KG/M2 | WEIGHT: 156 LBS | TEMPERATURE: 98 F | HEART RATE: 68 BPM | HEIGHT: 66.5 IN | DIASTOLIC BLOOD PRESSURE: 85 MMHG

## 2021-06-28 DIAGNOSIS — R76.8 POSITIVE ANA (ANTINUCLEAR ANTIBODY): ICD-10-CM

## 2021-06-28 DIAGNOSIS — M81.0 AGE-RELATED OSTEOPOROSIS WITHOUT CURRENT PATHOLOGICAL FRACTURE: Primary | ICD-10-CM

## 2021-06-28 DIAGNOSIS — K90.0 CELIAC DISEASE: ICD-10-CM

## 2021-06-28 DIAGNOSIS — E28.319 EARLY MENOPAUSE: ICD-10-CM

## 2021-06-28 DIAGNOSIS — Z51.81 THERAPEUTIC DRUG MONITORING: ICD-10-CM

## 2021-06-28 DIAGNOSIS — L40.9 PSORIASIS: ICD-10-CM

## 2021-06-28 PROCEDURE — 3079F DIAST BP 80-89 MM HG: CPT | Performed by: INTERNAL MEDICINE

## 2021-06-28 PROCEDURE — 3077F SYST BP >= 140 MM HG: CPT | Performed by: INTERNAL MEDICINE

## 2021-06-28 PROCEDURE — 3008F BODY MASS INDEX DOCD: CPT | Performed by: INTERNAL MEDICINE

## 2021-06-28 PROCEDURE — 99244 OFF/OP CNSLTJ NEW/EST MOD 40: CPT | Performed by: INTERNAL MEDICINE

## 2021-06-28 NOTE — PROGRESS NOTES
Rheumatology New Patient Note  =====================================================================================================      Date of visit: 6/28/2021  ? Patient presents with:  Establish Care: New pt, referred by PCP for osteoporosis.  DX BREAKFAST., Disp: 30 tablet, Rfl: 1  clindamycin HCl 150 MG Oral Cap, TAKE 4 CAPSULES BY MOUTH ONE HOUR PRIOR TO DENTAL APPT, Disp: , Rfl: 0  Vitamin C 500 MG Oral Tab, Take 500 mg by mouth daily. , Disp: , Rfl:   Calcium Citrate (CITRACAL OR), Take  by luis eduardo Comment:SUSR  Diazoxide                   Comment:POWD  Epinephrine             PALPITATIONS    Comment:SOLN  Sulfamethoxazole-Tr*        Comment:SOLN  Sympathomimetics              Objective     06/28/21  1453   BP: 140/85   Pulse: 68   Temp: 98.1 °F (36. 2,948 07/25/2015    LYMPHABS 1,622 07/25/2015    EOSABS 204 07/25/2015    BASABS 15 07/25/2015    NEUT 57.8 07/25/2015    LYMPH 31.8 07/25/2015    MON 6.1 07/25/2015    EOS 4.0 07/25/2015    BASO 0.3 07/25/2015    NEPERCENT 46.1 10/08/2020    LYPERCENT 40. 5/205:  TSH 1.37 with normal fT4    Radiology:    2021 DEXA  LUMBAR SPINE ANALYSIS RESULTS:       Bone mineral density (BMD) (g/cm2):  0.727     Lumbar T-Score:  -2.9       TOTAL HIP ANALYSIS RESULTS:         Bone mineral density (BMD) (g/cm2):  0.757     =====================================================================================================  Assessment and Plan    Assessment:  Age-related osteoporosis without current pathological fracture  (primary encounter diagnosis)  Psoriasis  Therapeut visit:    Age-related osteoporosis without current pathological fracture  -     PTH, INTACT; Future  -     PHOSPHORUS; Future  -     MAGNESIUM;  Future  -     TSH W REFLEX TO FREE T4; Future  -     VITAMIN D, 25-HYDROXY; Future  -     FERRITIN; Future  - speech recognition software technology. Despite proofreading, speech recognition errors could escape detection. If a word or phrase is confusing or out of context, please do not hesitate to call for   clarification.        Kind regards      Frandy Pozo MD

## 2021-06-29 ENCOUNTER — LAB ENCOUNTER (OUTPATIENT)
Dept: LAB | Age: 61
End: 2021-06-29
Attending: FAMILY MEDICINE
Payer: COMMERCIAL

## 2021-06-29 ENCOUNTER — LAB ENCOUNTER (OUTPATIENT)
Dept: LAB | Age: 61
End: 2021-06-29
Attending: INTERNAL MEDICINE
Payer: COMMERCIAL

## 2021-06-29 DIAGNOSIS — Z51.81 THERAPEUTIC DRUG MONITORING: ICD-10-CM

## 2021-06-29 DIAGNOSIS — K90.0 CELIAC DISEASE: ICD-10-CM

## 2021-06-29 DIAGNOSIS — R76.8 POSITIVE ANA (ANTINUCLEAR ANTIBODY): ICD-10-CM

## 2021-06-29 DIAGNOSIS — L40.9 PSORIASIS: ICD-10-CM

## 2021-06-29 DIAGNOSIS — E03.9 HYPOTHYROIDISM, UNSPECIFIED TYPE: ICD-10-CM

## 2021-06-29 DIAGNOSIS — M81.0 AGE-RELATED OSTEOPOROSIS WITHOUT CURRENT PATHOLOGICAL FRACTURE: ICD-10-CM

## 2021-06-29 LAB
ALBUMIN SERPL-MCNC: 4.1 G/DL (ref 3.4–5)
ALBUMIN/GLOB SERPL: 1.4 {RATIO} (ref 1–2)
ALP LIVER SERPL-CCNC: 132 U/L
ALT SERPL-CCNC: 52 U/L
ANION GAP SERPL CALC-SCNC: 4 MMOL/L (ref 0–18)
AST SERPL-CCNC: 33 U/L (ref 15–37)
BASOPHILS # BLD AUTO: 0.01 X10(3) UL (ref 0–0.2)
BASOPHILS NFR BLD AUTO: 0.2 %
BILIRUB SERPL-MCNC: 1 MG/DL (ref 0.1–2)
BILIRUB UR QL STRIP.AUTO: NEGATIVE
BUN BLD-MCNC: 14 MG/DL (ref 7–18)
BUN/CREAT SERPL: 16.1 (ref 10–20)
C3 SERPL-MCNC: 103 MG/DL (ref 90–180)
C4 SERPL-MCNC: 29.5 MG/DL (ref 10–40)
CALCIUM BLD-MCNC: 9 MG/DL (ref 8.5–10.1)
CHLORIDE SERPL-SCNC: 107 MMOL/L (ref 98–112)
CLARITY UR REFRACT.AUTO: CLEAR
CO2 SERPL-SCNC: 27 MMOL/L (ref 21–32)
CREAT BLD-MCNC: 0.87 MG/DL
CRP SERPL-MCNC: <0.29 MG/DL (ref ?–0.3)
DEPRECATED HBV CORE AB SER IA-ACNC: 88.7 NG/ML
DEPRECATED RDW RBC AUTO: 42.7 FL (ref 35.1–46.3)
EOSINOPHIL # BLD AUTO: 0.22 X10(3) UL (ref 0–0.7)
EOSINOPHIL NFR BLD AUTO: 5 %
ERYTHROCYTE [DISTWIDTH] IN BLOOD BY AUTOMATED COUNT: 12.4 % (ref 11–15)
GLOBULIN PLAS-MCNC: 2.9 G/DL (ref 2.8–4.4)
GLUCOSE BLD-MCNC: 90 MG/DL (ref 70–99)
GLUCOSE UR STRIP.AUTO-MCNC: NEGATIVE MG/DL
HAV IGM SER QL: 2.3 MG/DL (ref 1.6–2.6)
HCT VFR BLD AUTO: 39.4 %
HGB BLD-MCNC: 13.6 G/DL
IMM GRANULOCYTES # BLD AUTO: 0 X10(3) UL (ref 0–1)
IMM GRANULOCYTES NFR BLD: 0 %
KETONES UR STRIP.AUTO-MCNC: NEGATIVE MG/DL
LYMPHOCYTES # BLD AUTO: 1.25 X10(3) UL (ref 1–4)
LYMPHOCYTES NFR BLD AUTO: 28.2 %
M PROTEIN MFR SERPL ELPH: 7 G/DL (ref 6.4–8.2)
MCH RBC QN AUTO: 32.9 PG (ref 26–34)
MCHC RBC AUTO-ENTMCNC: 34.5 G/DL (ref 31–37)
MCV RBC AUTO: 95.2 FL
MONOCYTES # BLD AUTO: 0.39 X10(3) UL (ref 0.1–1)
MONOCYTES NFR BLD AUTO: 8.8 %
NEUTROPHILS # BLD AUTO: 2.57 X10 (3) UL (ref 1.5–7.7)
NEUTROPHILS # BLD AUTO: 2.57 X10(3) UL (ref 1.5–7.7)
NEUTROPHILS NFR BLD AUTO: 57.8 %
NITRITE UR QL STRIP.AUTO: NEGATIVE
OSMOLALITY SERPL CALC.SUM OF ELEC: 286 MOSM/KG (ref 275–295)
PATIENT FASTING Y/N/NP: YES
PH UR STRIP.AUTO: 6 [PH] (ref 5–8)
PHOSPHATE SERPL-MCNC: 3.2 MG/DL (ref 2.5–4.9)
PLATELET # BLD AUTO: 197 10(3)UL (ref 150–450)
POTASSIUM SERPL-SCNC: 4.1 MMOL/L (ref 3.5–5.1)
PROT UR STRIP.AUTO-MCNC: NEGATIVE MG/DL
PROT UR-MCNC: <5 MG/DL
PTH-INTACT SERPL-MCNC: 37 PG/ML (ref 18.5–88)
RBC # BLD AUTO: 4.14 X10(6)UL
RBC UR QL AUTO: NEGATIVE
SED RATE-ML: 9 MM/HR
SODIUM SERPL-SCNC: 138 MMOL/L (ref 136–145)
SP GR UR STRIP.AUTO: 1.01 (ref 1–1.03)
T4 FREE SERPL-MCNC: 1.3 NG/DL (ref 0.8–1.7)
TSI SER-ACNC: 0.48 MIU/ML (ref 0.36–3.74)
URATE SERPL-MCNC: 4.4 MG/DL
UROBILINOGEN UR STRIP.AUTO-MCNC: <2 MG/DL
VIT D+METAB SERPL-MCNC: 63.5 NG/ML (ref 30–100)
WBC # BLD AUTO: 4.4 X10(3) UL (ref 4–11)

## 2021-06-29 PROCEDURE — 83516 IMMUNOASSAY NONANTIBODY: CPT | Performed by: INTERNAL MEDICINE

## 2021-06-29 PROCEDURE — 84443 ASSAY THYROID STIM HORMONE: CPT | Performed by: FAMILY MEDICINE

## 2021-06-29 PROCEDURE — 82523 COLLAGEN CROSSLINKS: CPT | Performed by: INTERNAL MEDICINE

## 2021-06-29 PROCEDURE — 86160 COMPLEMENT ANTIGEN: CPT | Performed by: INTERNAL MEDICINE

## 2021-06-29 PROCEDURE — 84550 ASSAY OF BLOOD/URIC ACID: CPT | Performed by: INTERNAL MEDICINE

## 2021-06-29 PROCEDURE — 84439 ASSAY OF FREE THYROXINE: CPT | Performed by: FAMILY MEDICINE

## 2021-06-29 PROCEDURE — 83937 ASSAY OF OSTEOCALCIN: CPT | Performed by: INTERNAL MEDICINE

## 2021-06-29 PROCEDURE — 85025 COMPLETE CBC W/AUTO DIFF WBC: CPT | Performed by: INTERNAL MEDICINE

## 2021-06-29 PROCEDURE — 85652 RBC SED RATE AUTOMATED: CPT | Performed by: INTERNAL MEDICINE

## 2021-06-29 PROCEDURE — 84156 ASSAY OF PROTEIN URINE: CPT | Performed by: INTERNAL MEDICINE

## 2021-06-29 PROCEDURE — 81001 URINALYSIS AUTO W/SCOPE: CPT | Performed by: INTERNAL MEDICINE

## 2021-06-29 PROCEDURE — 83970 ASSAY OF PARATHORMONE: CPT | Performed by: INTERNAL MEDICINE

## 2021-06-29 PROCEDURE — 82306 VITAMIN D 25 HYDROXY: CPT | Performed by: INTERNAL MEDICINE

## 2021-06-29 PROCEDURE — 83735 ASSAY OF MAGNESIUM: CPT | Performed by: INTERNAL MEDICINE

## 2021-06-29 PROCEDURE — 82728 ASSAY OF FERRITIN: CPT | Performed by: INTERNAL MEDICINE

## 2021-06-29 PROCEDURE — 80053 COMPREHEN METABOLIC PANEL: CPT | Performed by: INTERNAL MEDICINE

## 2021-06-29 PROCEDURE — 86140 C-REACTIVE PROTEIN: CPT | Performed by: INTERNAL MEDICINE

## 2021-06-29 PROCEDURE — 84100 ASSAY OF PHOSPHORUS: CPT | Performed by: INTERNAL MEDICINE

## 2021-06-29 NOTE — PATIENT INSTRUCTIONS
1. Get bloodwork, I will call to discuss next steps after results are back. Please obtain fasting morning labs to ensure lab accurary.

## 2021-07-01 RX ORDER — LEVOTHYROXINE SODIUM 88 UG/1
88 TABLET ORAL
Qty: 90 TABLET | Refills: 0 | Status: SHIPPED | OUTPATIENT
Start: 2021-07-01 | End: 2021-09-16

## 2021-07-01 NOTE — TELEPHONE ENCOUNTER
90 day request    Rx Request  LEVOTHYROXINE SODIUM 88 MCG Oral Tab    Disp:        90            R: 0    Last Refilled:  06/21/2021     Last Visit: 05/04/2021 (Virtual)    Protocol Passed?  Yes[ x ]       No[  ]

## 2021-07-02 LAB
C-TELOPEPTIDE, BETA-CROSS-LINK: 649 PG/ML
Lab: 24 NG/ML
TTG IGA SER-ACNC: 0.2 U/ML (ref ?–7)
TTG IGG SER-ACNC: <0.6 U/ML (ref ?–7)

## 2021-07-06 ENCOUNTER — TELEPHONE (OUTPATIENT)
Dept: RHEUMATOLOGY | Facility: CLINIC | Age: 61
End: 2021-07-06

## 2021-07-06 DIAGNOSIS — M81.0 AGE-RELATED OSTEOPOROSIS WITHOUT CURRENT PATHOLOGICAL FRACTURE: Primary | ICD-10-CM

## 2021-07-06 DIAGNOSIS — T45.8X5S ADVERSE EFFECT OF BISPHOSPHONATE, SEQUELA: ICD-10-CM

## 2021-07-06 RX ORDER — ALENDRONATE SODIUM 70 MG/1
70 TABLET ORAL WEEKLY
Qty: 4 TABLET | Refills: 0 | Status: SHIPPED | OUTPATIENT
Start: 2021-07-06 | End: 2021-07-29

## 2021-07-06 NOTE — TELEPHONE ENCOUNTER
Discussed results with patient. Overall bone turnover markers were normal.  This means that there is no residual bisphosphonates that we will diminish bone turnover. At this point patient still hesitant about restarting antiresorptive therapy long-term.

## 2021-07-07 ENCOUNTER — TELEPHONE (OUTPATIENT)
Dept: RHEUMATOLOGY | Facility: CLINIC | Age: 61
End: 2021-07-07

## 2021-07-07 NOTE — TELEPHONE ENCOUNTER
Can you ask Lynette Paige if any other radiology sites have extended femur DEXA to evaluate for atypical femoral fracture.   It looks like both [facility] and [facility] (2 main DEXA ) both have this capability were to do so however the presence of the software ma

## 2021-07-07 NOTE — TELEPHONE ENCOUNTER
[facility], unavailable; with a pt. Called [facility] to check on this; spoke to Irma Torres; no THE [facility] sites do this. Image only Left hip and low back. She is checking with [facility] sites. They do not do this either but suggested ordering xray if needed.

## 2021-07-09 ENCOUNTER — TELEPHONE (OUTPATIENT)
Dept: RHEUMATOLOGY | Facility: CLINIC | Age: 61
End: 2021-07-09

## 2021-07-09 ENCOUNTER — HOSPITAL ENCOUNTER (OUTPATIENT)
Dept: BONE DENSITY | Age: 61
Discharge: HOME | End: 2021-07-09
Attending: INTERNAL MEDICINE
Payer: COMMERCIAL

## 2021-07-09 DIAGNOSIS — T45.8X5S ADVERSE EFFECT OF BISPHOSPHONATE, SEQUELA: ICD-10-CM

## 2021-07-09 DIAGNOSIS — M81.0 AGE-RELATED OSTEOPOROSIS WITHOUT CURRENT PATHOLOGICAL FRACTURE: ICD-10-CM

## 2021-07-09 PROCEDURE — 77080 DXA BONE DENSITY AXIAL: CPT | Performed by: INTERNAL MEDICINE

## 2021-07-09 RX ORDER — ALBUTEROL SULFATE 90 UG/1
2 AEROSOL, METERED RESPIRATORY (INHALATION) EVERY 6 HOURS PRN
Qty: 6.7 G | Refills: 1 | Status: SHIPPED | OUTPATIENT
Start: 2021-07-09

## 2021-07-09 NOTE — TELEPHONE ENCOUNTER
Spoke to Dr. Tasha Grewal cannot do DEXA extension to mid thigh. We will just do normal DEXA today. We will need to order an x-ray of the mid thighs bilaterally to evaluate for cortical thickening in terms of evaluation for risk of atypical femoral fracture. We will hold off on this and order next Monday to start the prior authorization process.

## 2021-07-12 PROBLEM — M81.0 AGE-RELATED OSTEOPOROSIS WITHOUT CURRENT PATHOLOGICAL FRACTURE: Status: ACTIVE | Noted: 2021-07-12

## 2021-07-12 PROBLEM — Z79.83 LONG TERM USE OF BISPHOSPHONATES: Status: ACTIVE | Noted: 2021-07-12

## 2021-07-28 DIAGNOSIS — T45.8X5S ADVERSE EFFECT OF BISPHOSPHONATE, SEQUELA: ICD-10-CM

## 2021-07-28 DIAGNOSIS — M81.0 AGE-RELATED OSTEOPOROSIS WITHOUT CURRENT PATHOLOGICAL FRACTURE: ICD-10-CM

## 2021-07-28 NOTE — TELEPHONE ENCOUNTER
LOV 6-28-21  Future Appointments   Date Time Provider Jeffrey Quinteros   12/20/2021  1:00 PM Lukas Sue MD [facility] 6-29-21

## 2021-07-29 RX ORDER — ALENDRONATE SODIUM 70 MG/1
70 TABLET ORAL WEEKLY
Qty: 12 TABLET | Refills: 0 | Status: SHIPPED | OUTPATIENT
Start: 2021-07-29 | End: 2021-10-11

## 2021-08-07 ENCOUNTER — OFFICE VISIT (OUTPATIENT)
Dept: FAMILY MEDICINE CLINIC | Facility: CLINIC | Age: 61
End: 2021-08-07
Payer: COMMERCIAL

## 2021-08-07 VITALS
WEIGHT: 151 LBS | BODY MASS INDEX: 24.27 KG/M2 | OXYGEN SATURATION: 99 % | HEIGHT: 66 IN | DIASTOLIC BLOOD PRESSURE: 72 MMHG | SYSTOLIC BLOOD PRESSURE: 122 MMHG | RESPIRATION RATE: 20 BRPM | TEMPERATURE: 98 F | HEART RATE: 83 BPM

## 2021-08-07 DIAGNOSIS — H92.02 OTALGIA OF LEFT EAR: Primary | ICD-10-CM

## 2021-08-07 PROCEDURE — 3078F DIAST BP <80 MM HG: CPT | Performed by: NURSE PRACTITIONER

## 2021-08-07 PROCEDURE — 99213 OFFICE O/P EST LOW 20 MIN: CPT | Performed by: NURSE PRACTITIONER

## 2021-08-07 PROCEDURE — 3074F SYST BP LT 130 MM HG: CPT | Performed by: NURSE PRACTITIONER

## 2021-08-07 PROCEDURE — 3008F BODY MASS INDEX DOCD: CPT | Performed by: NURSE PRACTITIONER

## 2021-08-07 NOTE — PROGRESS NOTES
CHIEF COMPLAINT:   Patient presents with:  Ear Problem: left ear pain, dizzy x3days      HPI:   Henna Cross is a 62year old female who presents to clinic today with complaints of left ear pain. Has had for 3  days. Pain is described as throbbing. cough, shortness of breath, or wheezing. CARDIOVASCULAR: No chest pain, palpitations  GI: No N/V/C/D.   NEURO: denies headaches or dizziness    EXAM:   /72   Pulse 83   Temp 97.7 °F (36.5 °C) (Tympanic)   Resp 20   Ht 5' 6\" (1.676 m)   Wt 151 lb (68 effusion (OME) or serous otitis media. It means there is fluid in the middle ear. It is not the same as acute otitis media, which is often from an infection. OME can happen when you have a cold if congestion blocks the passage that drains the middle ear. liver damage. · Ask your healthcare provider if you may use over-the-counter decongestants such as phenylephrine or pseudoephedrine. Keep in mind they are not always helpful. · Talk with your healthcare provider about using nasal spray decongestants.  Asif Guajardo

## 2021-09-15 ENCOUNTER — PATIENT MESSAGE (OUTPATIENT)
Dept: FAMILY MEDICINE CLINIC | Facility: CLINIC | Age: 61
End: 2021-09-15

## 2021-09-15 ENCOUNTER — LAB ENCOUNTER (OUTPATIENT)
Dept: LAB | Age: 61
End: 2021-09-15
Payer: COMMERCIAL

## 2021-09-15 DIAGNOSIS — E03.9 HYPOTHYROIDISM, UNSPECIFIED TYPE: ICD-10-CM

## 2021-09-15 LAB
T4 FREE SERPL-MCNC: 1.2 NG/DL (ref 0.8–1.7)
TSI SER-ACNC: 1.41 MIU/ML (ref 0.36–3.74)

## 2021-09-15 PROCEDURE — 84439 ASSAY OF FREE THYROXINE: CPT | Performed by: FAMILY MEDICINE

## 2021-09-15 PROCEDURE — 84443 ASSAY THYROID STIM HORMONE: CPT | Performed by: FAMILY MEDICINE

## 2021-09-15 NOTE — TELEPHONE ENCOUNTER
From: Elvira Bautista  To: Ivonne Epps DO  Sent: 9/15/2021 7:50 AM CDT  Subject: Lab work order     Hi,   I’m supposed to have blood drawn to check my thyroid since my dosage change in June, but don’t see a reminder for that in [address], so I don’t know

## 2021-09-16 RX ORDER — LEVOTHYROXINE SODIUM 88 UG/1
88 TABLET ORAL
Qty: 90 TABLET | Refills: 0 | Status: SHIPPED | OUTPATIENT
Start: 2021-09-16 | End: 2021-12-13

## 2021-09-16 NOTE — TELEPHONE ENCOUNTER
Next Appt:    With 7 [facility] Nadira Padilla DO)  10/11/2021 at 7:30 AM     5-4-21    LR 7-1-21

## 2021-10-05 ENCOUNTER — HOSPITAL ENCOUNTER (OUTPATIENT)
Dept: GENERAL RADIOLOGY | Age: 61
Discharge: HOME | End: 2021-10-05
Attending: INTERNAL MEDICINE
Payer: COMMERCIAL

## 2021-10-05 DIAGNOSIS — M81.0 AGE-RELATED OSTEOPOROSIS WITHOUT CURRENT PATHOLOGICAL FRACTURE: ICD-10-CM

## 2021-10-05 DIAGNOSIS — Z79.83 LONG TERM USE OF BISPHOSPHONATES: ICD-10-CM

## 2021-10-05 DIAGNOSIS — Z79.899 ENCOUNTER FOR LONG-TERM (CURRENT) USE OF HIGH-RISK MEDICATION: ICD-10-CM

## 2021-10-05 PROCEDURE — 73552 X-RAY EXAM OF FEMUR 2/>: CPT | Performed by: INTERNAL MEDICINE

## 2021-10-06 NOTE — PROGRESS NOTES
Dear [facility] news! The X-ray of the left femur (\"thigh bone\") did NOT show localized bone thickening, which may be seen in long term bisphosphonate use.  If this was seen, there would be a higher risk of atypical femoral fractures over t

## 2021-10-06 NOTE — PROGRESS NOTES
Dear [facility] news! The X-ray of the right femur (\"thigh bone\") did NOT show localized bone thickening, which may be seen in long term bisphosphonate use.  If this was seen, there would be a higher risk of atypical femoral fractures over

## 2021-10-10 DIAGNOSIS — M81.0 AGE-RELATED OSTEOPOROSIS WITHOUT CURRENT PATHOLOGICAL FRACTURE: ICD-10-CM

## 2021-10-10 DIAGNOSIS — T45.8X5S ADVERSE EFFECT OF BISPHOSPHONATE, SEQUELA: ICD-10-CM

## 2021-10-11 ENCOUNTER — OFFICE VISIT (OUTPATIENT)
Dept: FAMILY MEDICINE CLINIC | Facility: CLINIC | Age: 61
End: 2021-10-11
Payer: COMMERCIAL

## 2021-10-11 VITALS
RESPIRATION RATE: 18 BRPM | SYSTOLIC BLOOD PRESSURE: 122 MMHG | TEMPERATURE: 98 F | BODY MASS INDEX: 24.62 KG/M2 | HEIGHT: 66.5 IN | DIASTOLIC BLOOD PRESSURE: 74 MMHG | HEART RATE: 78 BPM | OXYGEN SATURATION: 99 % | WEIGHT: 155 LBS

## 2021-10-11 DIAGNOSIS — Z13.820 SCREENING FOR OSTEOPOROSIS: ICD-10-CM

## 2021-10-11 DIAGNOSIS — Z23 COVID-19 VACCINE ADMINISTERED: ICD-10-CM

## 2021-10-11 DIAGNOSIS — Z01.419 WELL WOMAN EXAM WITH ROUTINE GYNECOLOGICAL EXAM: Primary | ICD-10-CM

## 2021-10-11 DIAGNOSIS — Z12.31 ENCOUNTER FOR SCREENING MAMMOGRAM FOR HIGH-RISK PATIENT: ICD-10-CM

## 2021-10-11 DIAGNOSIS — Z00.00 ANNUAL PHYSICAL EXAM: ICD-10-CM

## 2021-10-11 PROCEDURE — 3008F BODY MASS INDEX DOCD: CPT | Performed by: FAMILY MEDICINE

## 2021-10-11 PROCEDURE — 3074F SYST BP LT 130 MM HG: CPT | Performed by: FAMILY MEDICINE

## 2021-10-11 PROCEDURE — 3078F DIAST BP <80 MM HG: CPT | Performed by: FAMILY MEDICINE

## 2021-10-11 PROCEDURE — 99396 PREV VISIT EST AGE 40-64: CPT | Performed by: FAMILY MEDICINE

## 2021-10-11 RX ORDER — ONDANSETRON 8 MG/1
8 TABLET, ORALLY DISINTEGRATING ORAL EVERY 8 HOURS PRN
Qty: 20 TABLET | Refills: 1 | Status: SHIPPED | OUTPATIENT
Start: 2021-10-11

## 2021-10-11 RX ORDER — MELATONIN
1000 DAILY
COMMUNITY

## 2021-10-11 RX ORDER — AZITHROMYCIN 500 MG/1
500 TABLET, FILM COATED ORAL DAILY
Qty: 5 TABLET | Refills: 0 | Status: SHIPPED | OUTPATIENT
Start: 2021-10-11 | End: 2021-12-01

## 2021-10-11 RX ORDER — ALENDRONATE SODIUM 70 MG/1
70 TABLET ORAL WEEKLY
Qty: 12 TABLET | Refills: 0 | Status: SHIPPED | OUTPATIENT
Start: 2021-10-11 | End: 2021-12-20

## 2021-10-11 NOTE — TELEPHONE ENCOUNTER
Future Appointments   Date Time Provider Jeffrey Quinteros   12/20/2021  1:00 PM Nishant Paredes MD [facility] Margaux Morgan

## 2021-10-11 NOTE — PROGRESS NOTES
HPI:   Irma Sandhu is a 62year old female who presents for a complete physical exam.     Wt Readings from Last 6 Encounters:  10/11/21 : 155 lb (70.3 kg)  08/07/21 : 151 lb (68.5 kg)  06/28/21 : 156 lb (70.8 kg)  05/25/21 : 152 lb (68.9 kg)  10/16/20 Procedure Laterality Date   • OTHER SURGICAL HISTORY  10/16/2020    Incision and drainage of thrombosed external hemorrhoid      Family History   Problem Relation Age of Onset   • Heart Disease Father    • Hypertension Father    • Cancer Father         b inguinal LAD, no lesions.  (( Speculum exam- introitus is normal,scant discharge,cervix is pink.)) deferred Bimanual exam- no adnexal masses or tenderness  RECTAL:good rectal tone,no mass, brown stool, stool is OB negative  MUSCULOSKELETAL: back is not tend

## 2021-11-03 ENCOUNTER — NURSE ONLY (OUTPATIENT)
Dept: FAMILY MEDICINE CLINIC | Facility: CLINIC | Age: 61
End: 2021-11-03
Payer: COMMERCIAL

## 2021-11-03 PROCEDURE — 90471 IMMUNIZATION ADMIN: CPT | Performed by: FAMILY MEDICINE

## 2021-11-03 PROCEDURE — 90686 IIV4 VACC NO PRSV 0.5 ML IM: CPT | Performed by: FAMILY MEDICINE

## 2021-12-01 ENCOUNTER — OFFICE VISIT (OUTPATIENT)
Dept: FAMILY MEDICINE CLINIC | Facility: CLINIC | Age: 61
End: 2021-12-01
Payer: COMMERCIAL

## 2021-12-01 VITALS
WEIGHT: 152 LBS | SYSTOLIC BLOOD PRESSURE: 124 MMHG | DIASTOLIC BLOOD PRESSURE: 70 MMHG | RESPIRATION RATE: 20 BRPM | OXYGEN SATURATION: 100 % | TEMPERATURE: 99 F | BODY MASS INDEX: 24.14 KG/M2 | HEART RATE: 64 BPM | HEIGHT: 66.5 IN

## 2021-12-01 DIAGNOSIS — H65.199 OTHER NON-RECURRENT ACUTE NONSUPPURATIVE OTITIS MEDIA, UNSPECIFIED LATERALITY: Primary | ICD-10-CM

## 2021-12-01 PROCEDURE — 3078F DIAST BP <80 MM HG: CPT | Performed by: PHYSICIAN ASSISTANT

## 2021-12-01 PROCEDURE — 3074F SYST BP LT 130 MM HG: CPT | Performed by: PHYSICIAN ASSISTANT

## 2021-12-01 PROCEDURE — 3008F BODY MASS INDEX DOCD: CPT | Performed by: PHYSICIAN ASSISTANT

## 2021-12-01 PROCEDURE — 99213 OFFICE O/P EST LOW 20 MIN: CPT | Performed by: PHYSICIAN ASSISTANT

## 2021-12-01 RX ORDER — AZITHROMYCIN 250 MG/1
TABLET, FILM COATED ORAL
Qty: 6 TABLET | Refills: 0 | Status: SHIPPED | OUTPATIENT
Start: 2021-12-01 | End: 2021-12-06

## 2021-12-01 RX ORDER — MOMETASONE 50 UG/1
1 SPRAY, METERED NASAL DAILY
Qty: 17 G | Refills: 2 | Status: SHIPPED | OUTPATIENT
Start: 2021-12-01

## 2021-12-01 NOTE — PROGRESS NOTES
Subjective:   Patient ID: El Jones is a 62year old female.     HPI   Patient presents c/o Left ear pain x 6 weeks  Waxes and wanes  Also has Itching, clogging, ringing sensations  No drainage  No dizziness  No hearing changes  Takes mucines with re Wheezing. 6.7 g 1   • clindamycin HCl 150 MG Oral Cap TAKE 4 CAPSULES BY MOUTH ONE HOUR PRIOR TO DENTAL APPT  0   • Calcium Citrate (CITRACAL OR) Take  by mouth. • Multiple Vitamins-Minerals (MULTIVITAMIN OR) Take  by mouth. Allergies:   Allergy Prescriptions     Signed Prescriptions Disp Refills   • mometasone furoate 50 MCG/ACT Nasal Suspension 17 g 2     Si spray by Nasal route daily.    • azithromycin 250 MG Oral Tab 6 tablet 0     Sig: Take 2 tablets (500 mg total) by mouth daily for 1 day

## 2021-12-13 RX ORDER — LEVOTHYROXINE SODIUM 88 UG/1
88 TABLET ORAL
Qty: 90 TABLET | Refills: 0 | Status: SHIPPED | OUTPATIENT
Start: 2021-12-13

## 2021-12-15 ENCOUNTER — LAB ENCOUNTER (OUTPATIENT)
Dept: LAB | Age: 61
End: 2021-12-15
Attending: FAMILY MEDICINE
Payer: COMMERCIAL

## 2021-12-15 DIAGNOSIS — Z00.00 ANNUAL PHYSICAL EXAM: ICD-10-CM

## 2021-12-15 PROCEDURE — 84439 ASSAY OF FREE THYROXINE: CPT | Performed by: FAMILY MEDICINE

## 2021-12-15 PROCEDURE — 80061 LIPID PANEL: CPT | Performed by: FAMILY MEDICINE

## 2021-12-15 PROCEDURE — 82306 VITAMIN D 25 HYDROXY: CPT | Performed by: FAMILY MEDICINE

## 2021-12-15 PROCEDURE — 82607 VITAMIN B-12: CPT | Performed by: FAMILY MEDICINE

## 2021-12-15 PROCEDURE — 80050 GENERAL HEALTH PANEL: CPT | Performed by: FAMILY MEDICINE

## 2021-12-20 ENCOUNTER — OFFICE VISIT (OUTPATIENT)
Dept: RHEUMATOLOGY | Facility: CLINIC | Age: 61
End: 2021-12-20
Payer: COMMERCIAL

## 2021-12-20 VITALS
SYSTOLIC BLOOD PRESSURE: 125 MMHG | WEIGHT: 152 LBS | DIASTOLIC BLOOD PRESSURE: 71 MMHG | HEART RATE: 88 BPM | HEIGHT: 66.5 IN | OXYGEN SATURATION: 97 % | BODY MASS INDEX: 24.14 KG/M2

## 2021-12-20 DIAGNOSIS — M81.0 AGE-RELATED OSTEOPOROSIS WITHOUT CURRENT PATHOLOGICAL FRACTURE: Primary | ICD-10-CM

## 2021-12-20 DIAGNOSIS — L40.9 PSORIASIS: ICD-10-CM

## 2021-12-20 DIAGNOSIS — T45.8X5S ADVERSE EFFECT OF BISPHOSPHONATE, SEQUELA: ICD-10-CM

## 2021-12-20 DIAGNOSIS — M19.049 OSTEOARTHRITIS OF HAND, UNSPECIFIED LATERALITY, UNSPECIFIED OSTEOARTHRITIS TYPE: ICD-10-CM

## 2021-12-20 PROCEDURE — 3074F SYST BP LT 130 MM HG: CPT | Performed by: INTERNAL MEDICINE

## 2021-12-20 PROCEDURE — 3078F DIAST BP <80 MM HG: CPT | Performed by: INTERNAL MEDICINE

## 2021-12-20 PROCEDURE — 99214 OFFICE O/P EST MOD 30 MIN: CPT | Performed by: INTERNAL MEDICINE

## 2021-12-20 PROCEDURE — 3008F BODY MASS INDEX DOCD: CPT | Performed by: INTERNAL MEDICINE

## 2021-12-20 RX ORDER — ALENDRONATE SODIUM 70 MG/1
70 TABLET ORAL WEEKLY
Qty: 13 TABLET | Refills: 2 | Status: SHIPPED | OUTPATIENT
Start: 2021-12-20

## 2021-12-20 NOTE — PROGRESS NOTES
Rheumatology f/u Patient Note  =====================================================================================================  ? Patient presents with: Follow - Up: 6mon f/u, has been feeling fine. Labs and XR completed. ?   Referring (will activities, 40 lbs weight vest. No significant high impact activities. Sister with cancer, very stressed. Adjusted synthyroid   Trying to cut sugar. Eating a better diet.    Psoriasis: is well controlled    Supplements:  Move free  [address] [place] [address] Onset   • Heart Disease Father    • Hypertension Father    • Cancer Father         basal cell skin   • Thyroid Disorder Sister    • Hypertension Sister    • Arthritis Sister    • Thyroid Disorder Sister    • Cancer Sister         basal cell skin   • Arthri NE 2.23 12/15/2021    LYMABS 1.71 12/15/2021    MOABSO 0.35 12/15/2021    EOABSO 0.17 12/15/2021    BAABSO 0.02 12/15/2021     Lab Results   Component Value Date    GLU 90 12/15/2021    BUN 16 12/15/2021    BUNCREA 16.1 06/29/2021    CREATSERUM 0.95 12/15/ (g/cm2):  0.615       Femoral neck T-Score:  -2.1           2018  LUMBAR SPINE ANALYSIS RESULTS:   Bone mineral density (BMD) (g/cm2): 0.783   Lumbar T-Score: -2.4   TOTAL HIP ANALYSIS RESULTS:   Bone mineral density (BMD) (g/cm2): 0.745   Total Hip T-Scor mineral density with a high risk of fracture. This DEXA scan is not available to me. Patient without any history of fracture. Risk factors for low bone mineral density: Early menopause, hypothyroidism, psoriasis, 15 steroid tapers in the past 10 years. plan of care were answered. Thank you for referring this delightful patient to me. Please feel free to contact me with any questions. This report was performed utilizing speech recognition software technology.  Despite proofreading, speech recognitio

## 2021-12-20 NOTE — PATIENT INSTRUCTIONS
Get 24-hour urine calcium test any time. For bone health, especially when on chronic corticosteroids or if you have low bone density (and/or osteoporosis):   1. Goal daily intake is 1000 to 1200 mg of calcium. Do not exceed 1500 mg daily.    2. Goal d

## 2022-01-31 ENCOUNTER — LAB ENCOUNTER (OUTPATIENT)
Dept: LAB | Age: 62
End: 2022-01-31
Attending: INTERNAL MEDICINE
Payer: COMMERCIAL

## 2022-01-31 DIAGNOSIS — E03.9 HYPOTHYROIDISM, UNSPECIFIED TYPE: ICD-10-CM

## 2022-01-31 LAB
T4 FREE SERPL-MCNC: 1.4 NG/DL (ref 0.8–1.7)
TSI SER-ACNC: 0.54 MIU/ML (ref 0.36–3.74)

## 2022-01-31 PROCEDURE — 84443 ASSAY THYROID STIM HORMONE: CPT | Performed by: FAMILY MEDICINE

## 2022-01-31 PROCEDURE — 84439 ASSAY OF FREE THYROXINE: CPT | Performed by: FAMILY MEDICINE

## 2022-02-24 RX ORDER — LEVOTHYROXINE SODIUM 0.1 MG/1
100 TABLET ORAL
Qty: 30 TABLET | Refills: 1 | Status: SHIPPED | OUTPATIENT
Start: 2022-02-24 | End: 2022-03-09

## 2022-03-09 RX ORDER — LEVOTHYROXINE SODIUM 0.1 MG/1
TABLET ORAL
Qty: 90 TABLET | Refills: 0 | Status: SHIPPED | OUTPATIENT
Start: 2022-03-09

## 2022-03-20 DIAGNOSIS — E03.9 HYPOTHYROIDISM, UNSPECIFIED: ICD-10-CM

## 2022-03-21 RX ORDER — LEVOTHYROXINE SODIUM 0.1 MG/1
TABLET ORAL
Qty: 30 TABLET | Refills: 1 | OUTPATIENT
Start: 2022-03-21

## 2022-05-10 ENCOUNTER — HOSPITAL ENCOUNTER (OUTPATIENT)
Dept: MAMMOGRAPHY | Age: 62
Discharge: HOME | End: 2022-05-10
Attending: FAMILY MEDICINE
Payer: COMMERCIAL

## 2022-05-10 DIAGNOSIS — Z12.31 ENCOUNTER FOR SCREENING MAMMOGRAM FOR HIGH-RISK PATIENT: ICD-10-CM

## 2022-05-10 PROCEDURE — 77063 BREAST TOMOSYNTHESIS BI: CPT | Performed by: FAMILY MEDICINE

## 2022-05-10 PROCEDURE — 77067 SCR MAMMO BI INCL CAD: CPT | Performed by: FAMILY MEDICINE

## 2022-06-08 ENCOUNTER — OFFICE VISIT (OUTPATIENT)
Dept: FAMILY MEDICINE CLINIC | Facility: CLINIC | Age: 62
End: 2022-06-08
Payer: COMMERCIAL

## 2022-06-08 VITALS
TEMPERATURE: 98 F | HEART RATE: 68 BPM | SYSTOLIC BLOOD PRESSURE: 122 MMHG | OXYGEN SATURATION: 98 % | WEIGHT: 154 LBS | RESPIRATION RATE: 18 BRPM | DIASTOLIC BLOOD PRESSURE: 74 MMHG | HEIGHT: 66.5 IN | BODY MASS INDEX: 24.46 KG/M2

## 2022-06-08 DIAGNOSIS — R12 HEARTBURN: ICD-10-CM

## 2022-06-08 DIAGNOSIS — R10.84 GENERALIZED ABDOMINAL PAIN: Primary | ICD-10-CM

## 2022-06-08 PROCEDURE — 3008F BODY MASS INDEX DOCD: CPT | Performed by: PHYSICIAN ASSISTANT

## 2022-06-08 PROCEDURE — 99214 OFFICE O/P EST MOD 30 MIN: CPT | Performed by: PHYSICIAN ASSISTANT

## 2022-06-08 PROCEDURE — 3074F SYST BP LT 130 MM HG: CPT | Performed by: PHYSICIAN ASSISTANT

## 2022-06-08 PROCEDURE — 3078F DIAST BP <80 MM HG: CPT | Performed by: PHYSICIAN ASSISTANT

## 2022-06-08 RX ORDER — PANTOPRAZOLE SODIUM 40 MG/1
40 TABLET, DELAYED RELEASE ORAL
Qty: 30 TABLET | Refills: 0 | Status: SHIPPED | OUTPATIENT
Start: 2022-06-08

## 2022-06-08 RX ORDER — DICLOFENAC SODIUM 75 MG/1
TABLET, DELAYED RELEASE ORAL
COMMUNITY
Start: 2022-05-09 | End: 2022-06-08

## 2022-06-08 RX ORDER — CYCLOBENZAPRINE HCL 10 MG
TABLET ORAL
COMMUNITY
Start: 2022-06-02

## 2022-06-15 DIAGNOSIS — E03.9 HYPOTHYROIDISM, UNSPECIFIED: ICD-10-CM

## 2022-06-15 RX ORDER — LEVOTHYROXINE SODIUM 0.1 MG/1
TABLET ORAL
Qty: 90 TABLET | Refills: 0 | Status: SHIPPED | OUTPATIENT
Start: 2022-06-15

## 2022-06-29 ENCOUNTER — LAB ENCOUNTER (OUTPATIENT)
Dept: LAB | Age: 62
End: 2022-06-29
Attending: FAMILY MEDICINE
Payer: COMMERCIAL

## 2022-06-29 DIAGNOSIS — R05.9 COUGH: ICD-10-CM

## 2022-06-29 DIAGNOSIS — J02.9 SORE THROAT: ICD-10-CM

## 2022-06-29 DIAGNOSIS — Z20.822 EXPOSURE TO COVID-19 VIRUS: ICD-10-CM

## 2022-06-29 DIAGNOSIS — R53.83 FATIGUE, UNSPECIFIED TYPE: ICD-10-CM

## 2022-06-30 ENCOUNTER — TELEMEDICINE (OUTPATIENT)
Dept: FAMILY MEDICINE CLINIC | Facility: CLINIC | Age: 62
End: 2022-06-30

## 2022-06-30 DIAGNOSIS — U07.1 COVID-19: Primary | ICD-10-CM

## 2022-06-30 LAB — SARS-COV-2 RNA RESP QL NAA+PROBE: DETECTED

## 2022-06-30 PROCEDURE — 99213 OFFICE O/P EST LOW 20 MIN: CPT | Performed by: FAMILY MEDICINE

## 2022-07-01 DIAGNOSIS — R10.84 GENERALIZED ABDOMINAL PAIN: ICD-10-CM

## 2022-07-01 DIAGNOSIS — R12 HEARTBURN: ICD-10-CM

## 2022-07-01 RX ORDER — PANTOPRAZOLE SODIUM 40 MG/1
TABLET, DELAYED RELEASE ORAL
Qty: 30 TABLET | Refills: 0 | Status: SHIPPED | OUTPATIENT
Start: 2022-07-01

## 2022-07-20 DIAGNOSIS — R10.84 GENERALIZED ABDOMINAL PAIN: ICD-10-CM

## 2022-07-20 DIAGNOSIS — R12 HEARTBURN: ICD-10-CM

## 2022-07-20 RX ORDER — PANTOPRAZOLE SODIUM 40 MG/1
40 TABLET, DELAYED RELEASE ORAL
Qty: 90 TABLET | Refills: 0 | Status: SHIPPED | OUTPATIENT
Start: 2022-07-20

## 2022-07-29 ENCOUNTER — HOSPITAL ENCOUNTER (OUTPATIENT)
Dept: ULTRASOUND IMAGING | Age: 62
Discharge: HOME | End: 2022-07-29
Attending: PHYSICIAN ASSISTANT
Payer: COMMERCIAL

## 2022-07-29 DIAGNOSIS — R10.84 GENERALIZED ABDOMINAL PAIN: ICD-10-CM

## 2022-07-29 PROCEDURE — 76700 US EXAM ABDOM COMPLETE: CPT | Performed by: PHYSICIAN ASSISTANT

## 2022-09-10 DIAGNOSIS — E03.9 HYPOTHYROIDISM, UNSPECIFIED: ICD-10-CM

## 2022-09-12 RX ORDER — LEVOTHYROXINE SODIUM 0.1 MG/1
TABLET ORAL
Qty: 90 TABLET | Refills: 0 | Status: SHIPPED | OUTPATIENT
Start: 2022-09-12

## 2022-09-15 ENCOUNTER — HOSPITAL ENCOUNTER (OUTPATIENT)
Dept: BONE DENSITY | Age: 62
Discharge: HOME | End: 2022-09-15
Attending: PHYSICIAN ASSISTANT
Payer: COMMERCIAL

## 2022-09-15 DIAGNOSIS — M81.0 AGE-RELATED OSTEOPOROSIS WITHOUT CURRENT PATHOLOGICAL FRACTURE: ICD-10-CM

## 2022-09-15 PROCEDURE — 77080 DXA BONE DENSITY AXIAL: CPT | Performed by: PHYSICIAN ASSISTANT

## 2022-10-12 ENCOUNTER — OFFICE VISIT (OUTPATIENT)
Dept: FAMILY MEDICINE CLINIC | Facility: CLINIC | Age: 62
End: 2022-10-12
Payer: COMMERCIAL

## 2022-10-12 VITALS
DIASTOLIC BLOOD PRESSURE: 76 MMHG | WEIGHT: 152 LBS | HEART RATE: 77 BPM | BODY MASS INDEX: 24.14 KG/M2 | HEIGHT: 66.5 IN | SYSTOLIC BLOOD PRESSURE: 110 MMHG | RESPIRATION RATE: 16 BRPM | OXYGEN SATURATION: 98 %

## 2022-10-12 DIAGNOSIS — Z23 NEED FOR VACCINATION: ICD-10-CM

## 2022-10-12 DIAGNOSIS — Z00.00 ANNUAL PHYSICAL EXAM: ICD-10-CM

## 2022-10-12 DIAGNOSIS — E03.9 HYPOTHYROIDISM, UNSPECIFIED: ICD-10-CM

## 2022-10-12 DIAGNOSIS — T45.8X5S ADVERSE EFFECT OF BISPHOSPHONATE, SEQUELA: ICD-10-CM

## 2022-10-12 DIAGNOSIS — M81.0 AGE-RELATED OSTEOPOROSIS WITHOUT CURRENT PATHOLOGICAL FRACTURE: ICD-10-CM

## 2022-10-12 DIAGNOSIS — N64.4 MASTODYNIA: ICD-10-CM

## 2022-10-12 DIAGNOSIS — Z01.419 WELL WOMAN EXAM WITH ROUTINE GYNECOLOGICAL EXAM: Primary | ICD-10-CM

## 2022-10-12 PROCEDURE — 87624 HPV HI-RISK TYP POOLED RSLT: CPT | Performed by: FAMILY MEDICINE

## 2022-10-12 PROCEDURE — 90471 IMMUNIZATION ADMIN: CPT | Performed by: FAMILY MEDICINE

## 2022-10-12 PROCEDURE — 3078F DIAST BP <80 MM HG: CPT | Performed by: FAMILY MEDICINE

## 2022-10-12 PROCEDURE — 3074F SYST BP LT 130 MM HG: CPT | Performed by: FAMILY MEDICINE

## 2022-10-12 PROCEDURE — 90686 IIV4 VACC NO PRSV 0.5 ML IM: CPT | Performed by: FAMILY MEDICINE

## 2022-10-12 PROCEDURE — 99396 PREV VISIT EST AGE 40-64: CPT | Performed by: FAMILY MEDICINE

## 2022-10-12 PROCEDURE — 3008F BODY MASS INDEX DOCD: CPT | Performed by: FAMILY MEDICINE

## 2022-10-12 RX ORDER — ALENDRONATE SODIUM 70 MG/1
70 TABLET ORAL WEEKLY
Qty: 13 TABLET | Refills: 2 | Status: SHIPPED | OUTPATIENT
Start: 2022-10-12

## 2022-10-12 RX ORDER — ONDANSETRON 4 MG/1
4 TABLET, ORALLY DISINTEGRATING ORAL EVERY 8 HOURS PRN
Qty: 20 TABLET | Refills: 0 | Status: SHIPPED | OUTPATIENT
Start: 2022-10-12

## 2022-10-12 RX ORDER — LEVOTHYROXINE SODIUM 0.1 MG/1
100 TABLET ORAL DAILY
Qty: 90 TABLET | Refills: 2 | Status: SHIPPED | OUTPATIENT
Start: 2022-10-12

## 2022-10-13 LAB — HPV I/H RISK 1 DNA SPEC QL NAA+PROBE: NEGATIVE

## 2022-11-15 ENCOUNTER — PATIENT MESSAGE (OUTPATIENT)
Dept: FAMILY MEDICINE CLINIC | Facility: CLINIC | Age: 62
End: 2022-11-15

## 2022-11-28 ENCOUNTER — LAB ENCOUNTER (OUTPATIENT)
Dept: LAB | Age: 62
End: 2022-11-28
Attending: INTERNAL MEDICINE
Payer: COMMERCIAL

## 2022-11-28 ENCOUNTER — LAB ENCOUNTER (OUTPATIENT)
Dept: LAB | Age: 62
End: 2022-11-28
Attending: FAMILY MEDICINE
Payer: COMMERCIAL

## 2022-11-28 DIAGNOSIS — Z00.00 ANNUAL PHYSICAL EXAM: ICD-10-CM

## 2022-11-28 DIAGNOSIS — E06.3 CHRONIC LYMPHOCYTIC THYROIDITIS: Primary | ICD-10-CM

## 2022-11-28 LAB
ALBUMIN SERPL-MCNC: 4.1 G/DL (ref 3.4–5)
ALBUMIN/GLOB SERPL: 1.5 {RATIO} (ref 1–2)
ALP LIVER SERPL-CCNC: 88 U/L
ALT SERPL-CCNC: 30 U/L
ANION GAP SERPL CALC-SCNC: 5 MMOL/L (ref 0–18)
AST SERPL-CCNC: 19 U/L (ref 15–37)
BASOPHILS # BLD AUTO: 0.01 X10(3) UL (ref 0–0.2)
BASOPHILS NFR BLD AUTO: 0.3 %
BILIRUB SERPL-MCNC: 1 MG/DL (ref 0.1–2)
BUN BLD-MCNC: 16 MG/DL (ref 7–18)
CALCIUM BLD-MCNC: 9.2 MG/DL (ref 8.5–10.1)
CHLORIDE SERPL-SCNC: 108 MMOL/L (ref 98–112)
CHOLEST SERPL-MCNC: 178 MG/DL (ref ?–200)
CO2 SERPL-SCNC: 29 MMOL/L (ref 21–32)
CREAT BLD-MCNC: 0.87 MG/DL
EOSINOPHIL # BLD AUTO: 0.11 X10(3) UL (ref 0–0.7)
EOSINOPHIL NFR BLD AUTO: 2.8 %
ERYTHROCYTE [DISTWIDTH] IN BLOOD BY AUTOMATED COUNT: 12.1 %
FASTING PATIENT LIPID ANSWER: YES
FASTING STATUS PATIENT QL REPORTED: YES
GFR SERPLBLD BASED ON 1.73 SQ M-ARVRAT: 76 ML/MIN/1.73M2 (ref 60–?)
GLOBULIN PLAS-MCNC: 2.8 G/DL (ref 2.8–4.4)
GLUCOSE BLD-MCNC: 94 MG/DL (ref 70–99)
HCT VFR BLD AUTO: 41.3 %
HDLC SERPL-MCNC: 67 MG/DL (ref 40–59)
HGB BLD-MCNC: 14.1 G/DL
IMM GRANULOCYTES # BLD AUTO: 0.01 X10(3) UL (ref 0–1)
IMM GRANULOCYTES NFR BLD: 0.3 %
LDLC SERPL CALC-MCNC: 92 MG/DL (ref ?–100)
LYMPHOCYTES # BLD AUTO: 1.5 X10(3) UL (ref 1–4)
LYMPHOCYTES NFR BLD AUTO: 37.7 %
MCH RBC QN AUTO: 32.9 PG (ref 26–34)
MCHC RBC AUTO-ENTMCNC: 34.1 G/DL (ref 31–37)
MCV RBC AUTO: 96.5 FL
MONOCYTES # BLD AUTO: 0.35 X10(3) UL (ref 0.1–1)
MONOCYTES NFR BLD AUTO: 8.8 %
NEUTROPHILS # BLD AUTO: 2 X10 (3) UL (ref 1.5–7.7)
NEUTROPHILS # BLD AUTO: 2 X10(3) UL (ref 1.5–7.7)
NEUTROPHILS NFR BLD AUTO: 50.1 %
NONHDLC SERPL-MCNC: 111 MG/DL (ref ?–130)
OSMOLALITY SERPL CALC.SUM OF ELEC: 295 MOSM/KG (ref 275–295)
PLATELET # BLD AUTO: 213 10(3)UL (ref 150–450)
POTASSIUM SERPL-SCNC: 4.3 MMOL/L (ref 3.5–5.1)
PROT SERPL-MCNC: 6.9 G/DL (ref 6.4–8.2)
RBC # BLD AUTO: 4.28 X10(6)UL
SODIUM SERPL-SCNC: 142 MMOL/L (ref 136–145)
T4 FREE SERPL-MCNC: 1.5 NG/DL (ref 0.8–1.7)
T4 SERPL-MCNC: 16.1 UG/DL
TRIGL SERPL-MCNC: 106 MG/DL (ref 30–149)
TSI SER-ACNC: 0.34 MIU/ML (ref 0.36–3.74)
VIT B12 SERPL-MCNC: 413 PG/ML (ref 193–986)
VIT D+METAB SERPL-MCNC: 45.1 NG/ML (ref 30–100)
VLDLC SERPL CALC-MCNC: 17 MG/DL (ref 0–30)
WBC # BLD AUTO: 4 X10(3) UL (ref 4–11)

## 2022-11-28 PROCEDURE — 80053 COMPREHEN METABOLIC PANEL: CPT | Performed by: FAMILY MEDICINE

## 2022-11-28 PROCEDURE — 82306 VITAMIN D 25 HYDROXY: CPT | Performed by: FAMILY MEDICINE

## 2022-11-28 PROCEDURE — 85025 COMPLETE CBC W/AUTO DIFF WBC: CPT | Performed by: FAMILY MEDICINE

## 2022-11-28 PROCEDURE — 82607 VITAMIN B-12: CPT | Performed by: FAMILY MEDICINE

## 2022-11-28 PROCEDURE — 80061 LIPID PANEL: CPT | Performed by: FAMILY MEDICINE

## 2022-11-29 DIAGNOSIS — E53.9 VITAMIN B DEFICIENCY: Primary | ICD-10-CM

## 2022-11-29 DIAGNOSIS — E03.9 HYPOTHYROIDISM, UNSPECIFIED TYPE: ICD-10-CM

## 2022-11-29 RX ORDER — LEVOTHYROXINE SODIUM 88 UG/1
88 TABLET ORAL
Qty: 90 TABLET | Refills: 0 | Status: SHIPPED | OUTPATIENT
Start: 2022-11-29

## 2022-12-30 ENCOUNTER — OFFICE VISIT (OUTPATIENT)
Dept: FAMILY MEDICINE CLINIC | Facility: CLINIC | Age: 62
End: 2022-12-30
Payer: COMMERCIAL

## 2022-12-30 VITALS
WEIGHT: 149 LBS | HEART RATE: 70 BPM | TEMPERATURE: 97 F | DIASTOLIC BLOOD PRESSURE: 70 MMHG | RESPIRATION RATE: 18 BRPM | SYSTOLIC BLOOD PRESSURE: 116 MMHG | OXYGEN SATURATION: 98 % | HEIGHT: 67 IN | BODY MASS INDEX: 23.39 KG/M2

## 2022-12-30 DIAGNOSIS — R39.9 UTI SYMPTOMS: Primary | ICD-10-CM

## 2022-12-30 LAB
APPEARANCE: CLEAR
BILIRUBIN: NEGATIVE
GLUCOSE (URINE DIPSTICK): NEGATIVE MG/DL
KETONES (URINE DIPSTICK): NEGATIVE MG/DL
MULTISTIX LOT#: ABNORMAL NUMERIC
NITRITE, URINE: NEGATIVE
OCCULT BLOOD: NEGATIVE
PH, URINE: 6 (ref 4.5–8)
PROTEIN (URINE DIPSTICK): NEGATIVE MG/DL
SPECIFIC GRAVITY: 1.02 (ref 1–1.03)
URINE-COLOR: YELLOW
UROBILINOGEN,SEMI-QN: 0.2 MG/DL (ref 0–1.9)

## 2022-12-30 PROCEDURE — 3078F DIAST BP <80 MM HG: CPT | Performed by: NURSE PRACTITIONER

## 2022-12-30 PROCEDURE — 87086 URINE CULTURE/COLONY COUNT: CPT | Performed by: NURSE PRACTITIONER

## 2022-12-30 PROCEDURE — 99213 OFFICE O/P EST LOW 20 MIN: CPT | Performed by: NURSE PRACTITIONER

## 2022-12-30 PROCEDURE — 81003 URINALYSIS AUTO W/O SCOPE: CPT | Performed by: NURSE PRACTITIONER

## 2022-12-30 PROCEDURE — 3074F SYST BP LT 130 MM HG: CPT | Performed by: NURSE PRACTITIONER

## 2022-12-30 PROCEDURE — 3008F BODY MASS INDEX DOCD: CPT | Performed by: NURSE PRACTITIONER

## 2022-12-30 RX ORDER — NITROFURANTOIN 25; 75 MG/1; MG/1
100 CAPSULE ORAL 2 TIMES DAILY
Qty: 14 CAPSULE | Refills: 0 | Status: SHIPPED | OUTPATIENT
Start: 2022-12-30 | End: 2023-01-06

## 2023-01-04 ENCOUNTER — LAB ENCOUNTER (OUTPATIENT)
Dept: LAB | Age: 63
End: 2023-01-04
Attending: FAMILY MEDICINE
Payer: COMMERCIAL

## 2023-01-04 DIAGNOSIS — E53.9 VITAMIN B DEFICIENCY: ICD-10-CM

## 2023-01-04 DIAGNOSIS — E03.9 HYPOTHYROIDISM, UNSPECIFIED TYPE: ICD-10-CM

## 2023-01-04 LAB
T4 FREE SERPL-MCNC: 1.4 NG/DL (ref 0.8–1.7)
T4 SERPL-MCNC: 15.3 UG/DL
TSI SER-ACNC: 0.61 MIU/ML (ref 0.36–3.74)
VIT B12 SERPL-MCNC: 522 PG/ML (ref 193–986)

## 2023-01-04 PROCEDURE — 82607 VITAMIN B-12: CPT | Performed by: FAMILY MEDICINE

## 2023-01-04 PROCEDURE — 84443 ASSAY THYROID STIM HORMONE: CPT | Performed by: FAMILY MEDICINE

## 2023-01-04 PROCEDURE — 84439 ASSAY OF FREE THYROXINE: CPT | Performed by: FAMILY MEDICINE

## 2023-01-11 ENCOUNTER — OFFICE VISIT (OUTPATIENT)
Dept: FAMILY MEDICINE CLINIC | Facility: CLINIC | Age: 63
End: 2023-01-11
Payer: COMMERCIAL

## 2023-01-11 VITALS
RESPIRATION RATE: 16 BRPM | DIASTOLIC BLOOD PRESSURE: 78 MMHG | OXYGEN SATURATION: 99 % | HEIGHT: 67 IN | WEIGHT: 149 LBS | SYSTOLIC BLOOD PRESSURE: 128 MMHG | BODY MASS INDEX: 23.39 KG/M2 | HEART RATE: 61 BPM

## 2023-01-11 DIAGNOSIS — R39.9 UTI SYMPTOMS: Primary | ICD-10-CM

## 2023-01-11 DIAGNOSIS — E03.9 HYPOTHYROIDISM, UNSPECIFIED: ICD-10-CM

## 2023-01-11 DIAGNOSIS — N95.9 POSTMENOPAUSAL SYMPTOMS: ICD-10-CM

## 2023-01-11 LAB
APPEARANCE: CLEAR
BILIRUBIN: NEGATIVE
GLUCOSE (URINE DIPSTICK): NEGATIVE MG/DL
KETONES (URINE DIPSTICK): NEGATIVE MG/DL
LEUKOCYTES: NEGATIVE
MULTISTIX EXPIRATION DATE: NORMAL DATE
MULTISTIX LOT#: NORMAL NUMERIC
NITRITE, URINE: NEGATIVE
OCCULT BLOOD: NEGATIVE
PH, URINE: 5.5 (ref 4.5–8)
PROTEIN (URINE DIPSTICK): NEGATIVE MG/DL
SPECIFIC GRAVITY: <=1.005 (ref 1–1.03)
URINE-COLOR: YELLOW
UROBILINOGEN,SEMI-QN: 0.2 MG/DL (ref 0–1.9)

## 2023-01-11 PROCEDURE — 99214 OFFICE O/P EST MOD 30 MIN: CPT | Performed by: PHYSICIAN ASSISTANT

## 2023-01-11 PROCEDURE — 3008F BODY MASS INDEX DOCD: CPT | Performed by: PHYSICIAN ASSISTANT

## 2023-01-11 PROCEDURE — 3074F SYST BP LT 130 MM HG: CPT | Performed by: PHYSICIAN ASSISTANT

## 2023-01-11 PROCEDURE — 3078F DIAST BP <80 MM HG: CPT | Performed by: PHYSICIAN ASSISTANT

## 2023-01-11 PROCEDURE — 81003 URINALYSIS AUTO W/O SCOPE: CPT | Performed by: PHYSICIAN ASSISTANT

## 2023-01-11 RX ORDER — ESTRADIOL 0.1 MG/G
CREAM VAGINAL
Qty: 42.5 G | Refills: 2 | Status: SHIPPED | OUTPATIENT
Start: 2023-01-11

## 2023-01-11 RX ORDER — LEVOTHYROXINE SODIUM 88 UG/1
88 TABLET ORAL
Qty: 90 TABLET | Refills: 1 | Status: SHIPPED | OUTPATIENT
Start: 2023-01-11

## 2023-01-11 RX ORDER — LEVOTHYROXINE SODIUM 0.1 MG/1
100 TABLET ORAL DAILY
Qty: 90 TABLET | Refills: 1 | Status: SHIPPED | OUTPATIENT
Start: 2023-01-11

## 2023-03-20 ENCOUNTER — LAB ENCOUNTER (OUTPATIENT)
Dept: LAB | Age: 63
End: 2023-03-20
Attending: PHYSICIAN ASSISTANT
Payer: COMMERCIAL

## 2023-03-20 DIAGNOSIS — E03.9 HYPOTHYROIDISM, UNSPECIFIED: ICD-10-CM

## 2023-03-20 LAB
T3FREE SERPL-MCNC: 2.56 PG/ML (ref 2.4–4.2)
T4 FREE SERPL-MCNC: 1.3 NG/DL (ref 0.8–1.7)
TSI SER-ACNC: 0.77 MIU/ML (ref 0.36–3.74)

## 2023-03-20 PROCEDURE — 84439 ASSAY OF FREE THYROXINE: CPT | Performed by: PHYSICIAN ASSISTANT

## 2023-03-20 PROCEDURE — 84443 ASSAY THYROID STIM HORMONE: CPT | Performed by: PHYSICIAN ASSISTANT

## 2023-03-20 PROCEDURE — 84481 FREE ASSAY (FT-3): CPT | Performed by: PHYSICIAN ASSISTANT

## 2023-03-23 PROBLEM — Z12.11 SPECIAL SCREENING FOR MALIGNANT NEOPLASM OF COLON: Status: ACTIVE | Noted: 2023-03-23

## 2023-04-27 ENCOUNTER — TELEMEDICINE (OUTPATIENT)
Dept: FAMILY MEDICINE CLINIC | Facility: CLINIC | Age: 63
End: 2023-04-27
Payer: COMMERCIAL

## 2023-04-27 DIAGNOSIS — J01.40 ACUTE NON-RECURRENT PANSINUSITIS: Primary | ICD-10-CM

## 2023-04-27 DIAGNOSIS — E03.9 HYPOTHYROIDISM, UNSPECIFIED TYPE: ICD-10-CM

## 2023-04-27 PROCEDURE — 99214 OFFICE O/P EST MOD 30 MIN: CPT | Performed by: PHYSICIAN ASSISTANT

## 2023-04-27 RX ORDER — AZITHROMYCIN 250 MG/1
TABLET, FILM COATED ORAL
Qty: 6 TABLET | Refills: 0 | Status: SHIPPED | OUTPATIENT
Start: 2023-04-27 | End: 2023-05-02

## 2023-05-08 ENCOUNTER — E-VISIT (OUTPATIENT)
Dept: TELEHEALTH | Age: 63
End: 2023-05-08

## 2023-05-08 ENCOUNTER — TELEMEDICINE (OUTPATIENT)
Dept: TELEHEALTH | Age: 63
End: 2023-05-08

## 2023-05-08 DIAGNOSIS — U07.1 COVID: Primary | ICD-10-CM

## 2023-05-08 DIAGNOSIS — Z02.9 ADMINISTRATIVE ENCOUNTER: Primary | ICD-10-CM

## 2023-05-08 PROCEDURE — 99421 OL DIG E/M SVC 5-10 MIN: CPT | Performed by: PHYSICIAN ASSISTANT

## 2023-05-25 ENCOUNTER — HOSPITAL ENCOUNTER (OUTPATIENT)
Dept: MAMMOGRAPHY | Age: 63
Discharge: HOME | End: 2023-05-25
Attending: FAMILY MEDICINE
Payer: COMMERCIAL

## 2023-05-25 ENCOUNTER — OFFICE VISIT (OUTPATIENT)
Dept: FAMILY MEDICINE CLINIC | Facility: CLINIC | Age: 63
End: 2023-05-25
Payer: COMMERCIAL

## 2023-05-25 ENCOUNTER — PATIENT MESSAGE (OUTPATIENT)
Dept: FAMILY MEDICINE CLINIC | Facility: CLINIC | Age: 63
End: 2023-05-25

## 2023-05-25 VITALS
HEART RATE: 87 BPM | HEIGHT: 67 IN | OXYGEN SATURATION: 99 % | SYSTOLIC BLOOD PRESSURE: 126 MMHG | WEIGHT: 157 LBS | DIASTOLIC BLOOD PRESSURE: 80 MMHG | BODY MASS INDEX: 24.64 KG/M2 | RESPIRATION RATE: 18 BRPM

## 2023-05-25 DIAGNOSIS — Z12.31 SCREENING MAMMOGRAM FOR BREAST CANCER: Primary | ICD-10-CM

## 2023-05-25 DIAGNOSIS — J01.40 ACUTE NON-RECURRENT PANSINUSITIS: Primary | ICD-10-CM

## 2023-05-25 DIAGNOSIS — E53.8 VITAMIN B 12 DEFICIENCY: ICD-10-CM

## 2023-05-25 DIAGNOSIS — U09.9 POST-COVID SYNDROME: ICD-10-CM

## 2023-05-25 DIAGNOSIS — N64.4 MASTODYNIA: ICD-10-CM

## 2023-05-25 DIAGNOSIS — Z12.31 SCREENING MAMMOGRAM FOR BREAST CANCER: ICD-10-CM

## 2023-05-25 PROCEDURE — 3079F DIAST BP 80-89 MM HG: CPT | Performed by: PHYSICIAN ASSISTANT

## 2023-05-25 PROCEDURE — 77063 BREAST TOMOSYNTHESIS BI: CPT | Performed by: FAMILY MEDICINE

## 2023-05-25 PROCEDURE — 3008F BODY MASS INDEX DOCD: CPT | Performed by: PHYSICIAN ASSISTANT

## 2023-05-25 PROCEDURE — 3074F SYST BP LT 130 MM HG: CPT | Performed by: PHYSICIAN ASSISTANT

## 2023-05-25 PROCEDURE — 99214 OFFICE O/P EST MOD 30 MIN: CPT | Performed by: PHYSICIAN ASSISTANT

## 2023-05-25 PROCEDURE — 96372 THER/PROPH/DIAG INJ SC/IM: CPT | Performed by: PHYSICIAN ASSISTANT

## 2023-05-25 PROCEDURE — 77067 SCR MAMMO BI INCL CAD: CPT | Performed by: FAMILY MEDICINE

## 2023-05-25 RX ORDER — METHYLPREDNISOLONE 4 MG/1
TABLET ORAL
Qty: 21 TABLET | Refills: 0 | Status: SHIPPED | OUTPATIENT
Start: 2023-05-25

## 2023-05-25 RX ORDER — CYANOCOBALAMIN 1000 UG/ML
1000 INJECTION, SOLUTION INTRAMUSCULAR; SUBCUTANEOUS ONCE
Status: COMPLETED | OUTPATIENT
Start: 2023-05-25 | End: 2023-05-25

## 2023-05-25 RX ORDER — DOXYCYCLINE HYCLATE 100 MG/1
100 CAPSULE ORAL 2 TIMES DAILY
Qty: 20 CAPSULE | Refills: 0 | Status: SHIPPED | OUTPATIENT
Start: 2023-05-25

## 2023-05-25 RX ADMIN — CYANOCOBALAMIN 1000 MCG: 1000 INJECTION, SOLUTION INTRAMUSCULAR; SUBCUTANEOUS at 16:30:00

## 2023-06-22 ENCOUNTER — OFFICE VISIT (OUTPATIENT)
Dept: FAMILY MEDICINE CLINIC | Facility: CLINIC | Age: 63
End: 2023-06-22
Payer: COMMERCIAL

## 2023-06-22 VITALS
OXYGEN SATURATION: 97 % | RESPIRATION RATE: 16 BRPM | HEART RATE: 93 BPM | WEIGHT: 157 LBS | SYSTOLIC BLOOD PRESSURE: 122 MMHG | DIASTOLIC BLOOD PRESSURE: 78 MMHG | BODY MASS INDEX: 24.64 KG/M2 | HEIGHT: 67 IN

## 2023-06-22 DIAGNOSIS — M25.572 ARTHRALGIA OF TOE, LEFT: Primary | ICD-10-CM

## 2023-06-22 PROCEDURE — 3074F SYST BP LT 130 MM HG: CPT | Performed by: FAMILY MEDICINE

## 2023-06-22 PROCEDURE — 3078F DIAST BP <80 MM HG: CPT | Performed by: FAMILY MEDICINE

## 2023-06-22 PROCEDURE — 3008F BODY MASS INDEX DOCD: CPT | Performed by: FAMILY MEDICINE

## 2023-06-22 PROCEDURE — 99213 OFFICE O/P EST LOW 20 MIN: CPT | Performed by: FAMILY MEDICINE

## 2023-06-22 NOTE — PATIENT INSTRUCTIONS
Possible sesamoiditis versus neuroma versus less likely arthritis: Ibuprofen 200 mg tablets, 3 tablets 3 times a day for the next week is an anti-inflammatory dose. Please call [phone] or use [facility] to schedule for laboratories or radiology imaging and procedures. Frankie Vela

## 2023-06-23 ENCOUNTER — HOSPITAL ENCOUNTER (OUTPATIENT)
Dept: GENERAL RADIOLOGY | Age: 63
Discharge: HOME | End: 2023-06-23
Attending: FAMILY MEDICINE
Payer: COMMERCIAL

## 2023-06-23 DIAGNOSIS — M25.572 ARTHRALGIA OF TOE, LEFT: ICD-10-CM

## 2023-06-23 PROCEDURE — 73630 X-RAY EXAM OF FOOT: CPT | Performed by: FAMILY MEDICINE

## 2023-07-10 ENCOUNTER — PATIENT MESSAGE (OUTPATIENT)
Dept: FAMILY MEDICINE CLINIC | Facility: CLINIC | Age: 63
End: 2023-07-10

## 2023-07-10 DIAGNOSIS — M25.572 ARTHRALGIA OF TOE, LEFT: Primary | ICD-10-CM

## 2023-07-11 RX ORDER — TRAMADOL HYDROCHLORIDE 50 MG/1
50 TABLET ORAL EVERY 6 HOURS PRN
Qty: 30 TABLET | Refills: 1 | Status: SHIPPED | OUTPATIENT
Start: 2023-07-11

## 2023-07-11 NOTE — TELEPHONE ENCOUNTER
From: Ruth Saldivar  To: He Oreilly MD  Sent: 7/10/2023 10:45 PM CDT  Subject: Continuing toe pain     Hi Dr Ani Wheat continued with the Motrin as you suggested, but this tor pain is not getting better and the Motrin is getting less effective. I have an appointment in 2 weeks to see the foot specialist, but was wondering if there is anything else I can take in the meantime to help with the pain.   Thanks,  Alexandra Owen

## 2023-07-24 ENCOUNTER — HOSPITAL ENCOUNTER (OUTPATIENT)
Dept: ULTRASOUND IMAGING | Age: 63
Discharge: HOME | End: 2023-07-24
Attending: FAMILY MEDICINE
Payer: COMMERCIAL

## 2023-07-24 DIAGNOSIS — R92.2 DENSE BREAST TISSUE ON MAMMOGRAM: ICD-10-CM

## 2023-07-24 PROCEDURE — 76641 ULTRASOUND BREAST COMPLETE: CPT | Performed by: FAMILY MEDICINE

## 2023-08-13 DIAGNOSIS — T45.8X5S ADVERSE EFFECT OF BISPHOSPHONATE, SEQUELA: ICD-10-CM

## 2023-08-13 DIAGNOSIS — M81.0 AGE-RELATED OSTEOPOROSIS WITHOUT CURRENT PATHOLOGICAL FRACTURE: ICD-10-CM

## 2023-08-14 RX ORDER — ALENDRONATE SODIUM 70 MG/1
70 TABLET ORAL WEEKLY
Qty: 12 TABLET | Refills: 0 | Status: SHIPPED | OUTPATIENT
Start: 2023-08-14

## 2023-10-13 ENCOUNTER — OFFICE VISIT (OUTPATIENT)
Dept: FAMILY MEDICINE CLINIC | Facility: CLINIC | Age: 63
End: 2023-10-13
Payer: COMMERCIAL

## 2023-10-13 VITALS
BODY MASS INDEX: 24.93 KG/M2 | OXYGEN SATURATION: 98 % | HEART RATE: 66 BPM | SYSTOLIC BLOOD PRESSURE: 122 MMHG | DIASTOLIC BLOOD PRESSURE: 74 MMHG | WEIGHT: 157 LBS | RESPIRATION RATE: 16 BRPM | HEIGHT: 66.5 IN

## 2023-10-13 DIAGNOSIS — Z12.39 ENCOUNTER FOR BREAST CANCER SCREENING USING NON-MAMMOGRAM MODALITY: ICD-10-CM

## 2023-10-13 DIAGNOSIS — R92.30 DENSE BREAST: ICD-10-CM

## 2023-10-13 DIAGNOSIS — R92.2 DENSE BREAST: ICD-10-CM

## 2023-10-13 DIAGNOSIS — Z13.820 SCREENING FOR OSTEOPOROSIS: ICD-10-CM

## 2023-10-13 DIAGNOSIS — Z12.31 ENCOUNTER FOR SCREENING MAMMOGRAM FOR HIGH-RISK PATIENT: ICD-10-CM

## 2023-10-13 DIAGNOSIS — Z00.00 ANNUAL PHYSICAL EXAM: Primary | ICD-10-CM

## 2023-10-13 PROCEDURE — 99396 PREV VISIT EST AGE 40-64: CPT | Performed by: FAMILY MEDICINE

## 2023-10-13 PROCEDURE — 3074F SYST BP LT 130 MM HG: CPT | Performed by: FAMILY MEDICINE

## 2023-10-13 PROCEDURE — 3078F DIAST BP <80 MM HG: CPT | Performed by: FAMILY MEDICINE

## 2023-10-13 PROCEDURE — 3008F BODY MASS INDEX DOCD: CPT | Performed by: FAMILY MEDICINE

## 2023-10-19 ENCOUNTER — PATIENT MESSAGE (OUTPATIENT)
Dept: FAMILY MEDICINE CLINIC | Facility: CLINIC | Age: 63
End: 2023-10-19

## 2023-10-20 RX ORDER — ALBUTEROL SULFATE 90 UG/1
2 AEROSOL, METERED RESPIRATORY (INHALATION) EVERY 6 HOURS PRN
Qty: 6.7 G | Refills: 1 | Status: SHIPPED | OUTPATIENT
Start: 2023-10-20

## 2023-10-20 RX ORDER — ONDANSETRON 4 MG/1
4 TABLET, ORALLY DISINTEGRATING ORAL EVERY 8 HOURS PRN
Qty: 20 TABLET | Refills: 0 | Status: SHIPPED | OUTPATIENT
Start: 2023-10-20

## 2023-10-24 ENCOUNTER — LAB ENCOUNTER (OUTPATIENT)
Dept: LAB | Age: 63
End: 2023-10-24
Attending: FAMILY MEDICINE

## 2023-10-24 ENCOUNTER — TELEPHONE (OUTPATIENT)
Dept: FAMILY MEDICINE CLINIC | Facility: CLINIC | Age: 63
End: 2023-10-24

## 2023-10-24 DIAGNOSIS — E53.8 VITAMIN B 12 DEFICIENCY: Primary | ICD-10-CM

## 2023-10-24 DIAGNOSIS — Z00.00 ROUTINE GENERAL MEDICAL EXAMINATION AT A HEALTH CARE FACILITY: ICD-10-CM

## 2023-10-24 DIAGNOSIS — E03.9 HYPOTHYROIDISM, UNSPECIFIED TYPE: ICD-10-CM

## 2023-10-24 DIAGNOSIS — Z00.00 ANNUAL PHYSICAL EXAM: ICD-10-CM

## 2023-10-24 DIAGNOSIS — E53.8 VITAMIN B 12 DEFICIENCY: ICD-10-CM

## 2023-10-24 LAB
ALBUMIN SERPL-MCNC: 4 G/DL (ref 3.4–5)
ALBUMIN/GLOB SERPL: 1.4 {RATIO} (ref 1–2)
ALP LIVER SERPL-CCNC: 80 U/L
ALT SERPL-CCNC: 32 U/L
ANION GAP SERPL CALC-SCNC: 4 MMOL/L (ref 0–18)
AST SERPL-CCNC: 17 U/L (ref 15–37)
BASOPHILS # BLD AUTO: 0.02 X10(3) UL (ref 0–0.2)
BASOPHILS NFR BLD AUTO: 0.5 %
BILIRUB SERPL-MCNC: 1.1 MG/DL (ref 0.1–2)
BUN BLD-MCNC: 14 MG/DL (ref 7–18)
CALCIUM BLD-MCNC: 9.4 MG/DL (ref 8.5–10.1)
CHLORIDE SERPL-SCNC: 108 MMOL/L (ref 98–112)
CHOLEST SERPL-MCNC: 175 MG/DL (ref ?–200)
CO2 SERPL-SCNC: 28 MMOL/L (ref 21–32)
CREAT BLD-MCNC: 0.99 MG/DL
EGFRCR SERPLBLD CKD-EPI 2021: 64 ML/MIN/1.73M2 (ref 60–?)
EOSINOPHIL # BLD AUTO: 0.19 X10(3) UL (ref 0–0.7)
EOSINOPHIL NFR BLD AUTO: 4.5 %
ERYTHROCYTE [DISTWIDTH] IN BLOOD BY AUTOMATED COUNT: 12.2 %
EST. AVERAGE GLUCOSE BLD GHB EST-MCNC: 97 MG/DL (ref 68–126)
FASTING PATIENT LIPID ANSWER: YES
FASTING STATUS PATIENT QL REPORTED: YES
GLOBULIN PLAS-MCNC: 2.9 G/DL (ref 2.8–4.4)
GLUCOSE BLD-MCNC: 93 MG/DL (ref 70–99)
HBA1C MFR BLD: 5 % (ref ?–5.7)
HCT VFR BLD AUTO: 40.5 %
HDLC SERPL-MCNC: 69 MG/DL (ref 40–59)
HGB BLD-MCNC: 13.9 G/DL
IMM GRANULOCYTES # BLD AUTO: 0 X10(3) UL (ref 0–1)
IMM GRANULOCYTES NFR BLD: 0 %
LDLC SERPL CALC-MCNC: 84 MG/DL (ref ?–100)
LYMPHOCYTES # BLD AUTO: 1.78 X10(3) UL (ref 1–4)
LYMPHOCYTES NFR BLD AUTO: 42.1 %
MAGNESIUM SERPL-MCNC: 2.1 MG/DL (ref 1.6–2.6)
MCH RBC QN AUTO: 33 PG (ref 26–34)
MCHC RBC AUTO-ENTMCNC: 34.3 G/DL (ref 31–37)
MCV RBC AUTO: 96.2 FL
MONOCYTES # BLD AUTO: 0.39 X10(3) UL (ref 0.1–1)
MONOCYTES NFR BLD AUTO: 9.2 %
NEUTROPHILS # BLD AUTO: 1.85 X10 (3) UL (ref 1.5–7.7)
NEUTROPHILS # BLD AUTO: 1.85 X10(3) UL (ref 1.5–7.7)
NEUTROPHILS NFR BLD AUTO: 43.7 %
NONHDLC SERPL-MCNC: 106 MG/DL (ref ?–130)
OSMOLALITY SERPL CALC.SUM OF ELEC: 290 MOSM/KG (ref 275–295)
PLATELET # BLD AUTO: 217 10(3)UL (ref 150–450)
POTASSIUM SERPL-SCNC: 4.2 MMOL/L (ref 3.5–5.1)
PROT SERPL-MCNC: 6.9 G/DL (ref 6.4–8.2)
RBC # BLD AUTO: 4.21 X10(6)UL
SODIUM SERPL-SCNC: 140 MMOL/L (ref 136–145)
T3FREE SERPL-MCNC: 2.41 PG/ML (ref 2.4–4.2)
T4 FREE SERPL-MCNC: 1.2 NG/DL (ref 0.8–1.7)
TRIGL SERPL-MCNC: 124 MG/DL (ref 30–149)
TSI SER-ACNC: 1.68 MIU/ML (ref 0.36–3.74)
VIT B12 SERPL-MCNC: 446 PG/ML (ref 193–986)
VIT D+METAB SERPL-MCNC: 54.7 NG/ML (ref 30–100)
VLDLC SERPL CALC-MCNC: 20 MG/DL (ref 0–30)
WBC # BLD AUTO: 4.2 X10(3) UL (ref 4–11)

## 2023-10-24 PROCEDURE — 83036 HEMOGLOBIN GLYCOSYLATED A1C: CPT | Performed by: FAMILY MEDICINE

## 2023-10-24 PROCEDURE — 80050 GENERAL HEALTH PANEL: CPT | Performed by: FAMILY MEDICINE

## 2023-10-24 PROCEDURE — 80061 LIPID PANEL: CPT | Performed by: FAMILY MEDICINE

## 2023-10-24 PROCEDURE — 84439 ASSAY OF FREE THYROXINE: CPT | Performed by: FAMILY MEDICINE

## 2023-10-24 PROCEDURE — 84481 FREE ASSAY (FT-3): CPT | Performed by: PHYSICIAN ASSISTANT

## 2023-10-24 PROCEDURE — 82306 VITAMIN D 25 HYDROXY: CPT | Performed by: FAMILY MEDICINE

## 2023-10-24 PROCEDURE — 82607 VITAMIN B-12: CPT | Performed by: FAMILY MEDICINE

## 2023-10-24 PROCEDURE — 83735 ASSAY OF MAGNESIUM: CPT | Performed by: FAMILY MEDICINE

## 2023-10-24 NOTE — TELEPHONE ENCOUNTER
Wants magnesium order added to her labs done today. Will we do that? She spoke to Marie about this at last visit. Call to advise.

## 2023-10-25 ENCOUNTER — NURSE ONLY (OUTPATIENT)
Dept: FAMILY MEDICINE CLINIC | Facility: CLINIC | Age: 63
End: 2023-10-25

## 2023-10-25 PROCEDURE — 90686 IIV4 VACC NO PRSV 0.5 ML IM: CPT | Performed by: FAMILY MEDICINE

## 2023-10-25 PROCEDURE — 90471 IMMUNIZATION ADMIN: CPT | Performed by: FAMILY MEDICINE

## 2023-11-10 DIAGNOSIS — T45.8X5S ADVERSE EFFECT OF BISPHOSPHONATE, SEQUELA: ICD-10-CM

## 2023-11-10 DIAGNOSIS — M81.0 AGE-RELATED OSTEOPOROSIS WITHOUT CURRENT PATHOLOGICAL FRACTURE: ICD-10-CM

## 2023-11-10 RX ORDER — ALENDRONATE SODIUM 70 MG/1
70 TABLET ORAL WEEKLY
Qty: 12 TABLET | Refills: 0 | Status: SHIPPED | OUTPATIENT
Start: 2023-11-10

## 2024-01-02 RX ORDER — ALBUTEROL SULFATE 90 UG/1
2 AEROSOL, METERED RESPIRATORY (INHALATION) EVERY 6 HOURS PRN
Qty: 8.5 EACH | Refills: 1 | Status: SHIPPED | OUTPATIENT
Start: 2024-01-02

## 2024-01-02 NOTE — TELEPHONE ENCOUNTER
.A refill request was received for:  Requested Prescriptions     Pending Prescriptions Disp Refills    ALBUTEROL 108 (90 Base) MCG/ACT Inhalation Aero Soln [Pharmacy Med Name: ALBUTEROL HFA (PROAIR) INHALER] 8.5 each 1     Sig: INHALE 2 PUFFS INTO THE LUNGS EVERY 6 HOURS AS NEEDED FOR WHEEZE       Last refill date:   10/20/2023      Last office visit: 10/13/2023      Follow up due:  No future appointments.

## 2024-01-28 DIAGNOSIS — E03.9 HYPOTHYROIDISM, UNSPECIFIED: ICD-10-CM

## 2024-01-29 DIAGNOSIS — E03.9 HYPOTHYROIDISM, UNSPECIFIED: ICD-10-CM

## 2024-01-29 RX ORDER — LEVOTHYROXINE SODIUM 0.1 MG/1
100 TABLET ORAL DAILY
Qty: 90 TABLET | Refills: 1 | Status: SHIPPED | OUTPATIENT
Start: 2024-01-29

## 2024-01-29 RX ORDER — LEVOTHYROXINE SODIUM 88 UG/1
88 TABLET ORAL
Qty: 90 TABLET | Refills: 1 | Status: SHIPPED | OUTPATIENT
Start: 2024-01-29

## 2024-02-08 DIAGNOSIS — M81.0 AGE-RELATED OSTEOPOROSIS WITHOUT CURRENT PATHOLOGICAL FRACTURE: ICD-10-CM

## 2024-02-08 DIAGNOSIS — T45.8X5S ADVERSE EFFECT OF BISPHOSPHONATE, SEQUELA: ICD-10-CM

## 2024-02-08 RX ORDER — ALENDRONATE SODIUM 70 MG/1
70 TABLET ORAL WEEKLY
Qty: 12 TABLET | Refills: 0 | Status: SHIPPED | OUTPATIENT
Start: 2024-02-08

## 2024-02-15 ENCOUNTER — OFFICE VISIT (OUTPATIENT)
Dept: FAMILY MEDICINE CLINIC | Facility: CLINIC | Age: 64
End: 2024-02-15
Payer: COMMERCIAL

## 2024-02-15 VITALS
HEART RATE: 77 BPM | HEIGHT: 66.5 IN | BODY MASS INDEX: 24.46 KG/M2 | RESPIRATION RATE: 16 BRPM | SYSTOLIC BLOOD PRESSURE: 138 MMHG | WEIGHT: 154 LBS | DIASTOLIC BLOOD PRESSURE: 78 MMHG | OXYGEN SATURATION: 97 %

## 2024-02-15 DIAGNOSIS — N95.9 POSTMENOPAUSAL SYMPTOMS: ICD-10-CM

## 2024-02-15 DIAGNOSIS — R35.0 FREQUENCY OF URINATION: Primary | ICD-10-CM

## 2024-02-15 LAB
APPEARANCE: CLEAR
BILIRUBIN: NEGATIVE
GLUCOSE (URINE DIPSTICK): NEGATIVE MG/DL
KETONES (URINE DIPSTICK): NEGATIVE MG/DL
LEUKOCYTES: NEGATIVE
MULTISTIX LOT#: NORMAL NUMERIC
NITRITE, URINE: NEGATIVE
OCCULT BLOOD: NEGATIVE
PH, URINE: 6 (ref 4.5–8)
PROTEIN (URINE DIPSTICK): NEGATIVE MG/DL
SPECIFIC GRAVITY: 1.02 (ref 1–1.03)
URINE-COLOR: YELLOW
UROBILINOGEN,SEMI-QN: 0.2 MG/DL (ref 0–1.9)

## 2024-02-15 PROCEDURE — 87086 URINE CULTURE/COLONY COUNT: CPT | Performed by: INTERNAL MEDICINE

## 2024-02-15 PROCEDURE — 81003 URINALYSIS AUTO W/O SCOPE: CPT | Performed by: INTERNAL MEDICINE

## 2024-02-15 PROCEDURE — 3075F SYST BP GE 130 - 139MM HG: CPT | Performed by: INTERNAL MEDICINE

## 2024-02-15 PROCEDURE — 3078F DIAST BP <80 MM HG: CPT | Performed by: INTERNAL MEDICINE

## 2024-02-15 PROCEDURE — 3008F BODY MASS INDEX DOCD: CPT | Performed by: INTERNAL MEDICINE

## 2024-02-15 PROCEDURE — 99213 OFFICE O/P EST LOW 20 MIN: CPT | Performed by: INTERNAL MEDICINE

## 2024-02-15 RX ORDER — ESTRADIOL 0.1 MG/G
CREAM VAGINAL
Qty: 42.5 G | Refills: 2 | Status: SHIPPED | OUTPATIENT
Start: 2024-02-15

## 2024-02-15 NOTE — PROGRESS NOTES
Michelle Tadeo is a 63 year old female.  HPI:   C/o left sided pelvic pressure and urinary urgency.   No blood.  No flank pain.  No fevers.    Current Outpatient Medications   Medication Sig Dispense Refill    triamcinolone 0.1 % External Ointment TAKE 1(ONE) APPLICATION(S) TOPICAL 2(TWO) TIMES A DAY TO AFFECTED AREAS ON BODY UNTIL RESOLVED.      ALENDRONATE 70 MG Oral Tab TAKE 1 TABLET (70 MG TOTAL) BY MOUTH ONCE A WEEK 12 tablet 0    levothyroxine 100 MCG Oral Tab Take 1 tablet (100 mcg total) by mouth daily. 90 tablet 1    levothyroxine 88 MCG Oral Tab Take 1 tablet (88 mcg total) by mouth before breakfast. 90 tablet 1    albuterol 108 (90 Base) MCG/ACT Inhalation Aero Soln Inhale 2 puffs into the lungs every 6 (six) hours as needed for Wheezing. 8.5 each 1    ondansetron 4 MG Oral Tablet Dispersible Take 1 tablet (4 mg total) by mouth every 8 (eight) hours as needed for Nausea. 20 tablet 0    estradiol 0.1 MG/GM Vaginal Cream Initial: 500 mg to 1 g/day intravaginally for 2 weeks, then 500 mg to 1 g one to three times per week 42.5 g 2    Glucosamine-Chondroitin (MOVE FREE OR) Take by mouth.      Misc Natural Products (LUTEIN 20 OR) Take by mouth.      Calcium Citrate-Vitamin D (CITRACAL + D OR) Take by mouth.      Vitamin D3, Cholecalciferol, 25 MCG (1000 UT) Oral Tab Take 1 tablet (1,000 Units total) by mouth daily.      B Complex-C-Folic Acid Oral Tab Take by mouth.      clindamycin HCl 150 MG Oral Cap TAKE 4 CAPSULES BY MOUTH ONE HOUR PRIOR TO DENTAL APPT  0    Calcium Citrate (CITRACAL OR) Take by mouth.      Multiple Vitamins-Minerals (MULTIVITAMIN OR) Take by mouth.      traMADol 50 MG Oral Tab Take 1 tablet (50 mg total) by mouth every 6 (six) hours as needed for Pain. (Patient not taking: Reported on 2/15/2024) 30 tablet 1      Past Medical History:   Diagnosis Date    Acute frontal sinusitis     Acute maxillary sinusitis     Acute serous otitis media     Bloating     Chronic cystic mastitis      Diarrhea     Diarrhea, unspecified     Flatulence/gas pain/belching     Heartburn     Hemorrhoids     Impacted cerumen     Normal delivery     Osteoporosis     Other acute otitis externa     Personal history of traumatic brain injury     concussion    Thyroid disease       Social History:  Social History     Socioeconomic History    Marital status:    Tobacco Use    Smoking status: Never    Smokeless tobacco: Never   Substance and Sexual Activity    Alcohol use: Yes     Comment: social    Drug use: No   Other Topics Concern    Caffeine Concern Yes    Exercise Yes        REVIEW OF SYSTEMS:   GENERAL HEALTH: feels well otherwise  SKIN: denies rash  RESPIRATORY: denies shortness of breath or cough  CARDIOVASCULAR: denies chest pain or pressure  GI: denies N/V/D; denies abdominal pain    : denies dysuria, + frequency, no incontinence  NEURO: denies headaches    EXAM:   /78   Pulse 77   Resp 16   Ht 5' 6.5\" (1.689 m)   Wt 154 lb (69.9 kg)   SpO2 97%   BMI 24.48 kg/m²   GENERAL: well developed, well nourished,in no apparent distress  SKIN: warm & dry  HEENT: atraumatic, normocephalic, TMs clear, throat clear  NECK: supple,no adenopathy   LUNGS: CTA, easy breathing  CV: normal S1S2, RRR without murmur  GI: good BS's,no masses, HSM or tenderness  : no pelvic TTP    ASSESSMENT AND PLAN:   1. Postmenopausal symptoms  - estradiol 0.1 MG/GM Vaginal Cream; Initial: 500 mg to 1 g/day intravaginally for 2 weeks, then 500 mg to 1 g one to three times per week  Dispense: 42.5 g; Refill: 2    2. Frequency of urination  - URINALYSIS, AUTO, W/O SCOPE  - Urine Culture, Routine [E]; Future  - Urine Culture, Routine [E]        The patient indicates understanding of these issues and agrees to the plan.  Follow up prn.

## 2024-03-27 ENCOUNTER — LAB ENCOUNTER (OUTPATIENT)
Dept: LAB | Age: 64
End: 2024-03-27
Attending: FAMILY MEDICINE
Payer: COMMERCIAL

## 2024-03-27 ENCOUNTER — OFFICE VISIT (OUTPATIENT)
Dept: FAMILY MEDICINE CLINIC | Facility: CLINIC | Age: 64
End: 2024-03-27
Payer: COMMERCIAL

## 2024-03-27 VITALS
HEIGHT: 66.5 IN | DIASTOLIC BLOOD PRESSURE: 80 MMHG | BODY MASS INDEX: 24.62 KG/M2 | HEART RATE: 78 BPM | SYSTOLIC BLOOD PRESSURE: 110 MMHG | WEIGHT: 155 LBS | OXYGEN SATURATION: 98 % | RESPIRATION RATE: 16 BRPM

## 2024-03-27 DIAGNOSIS — R53.83 FATIGUE, UNSPECIFIED TYPE: ICD-10-CM

## 2024-03-27 DIAGNOSIS — E06.3 CHRONIC LYMPHOCYTIC THYROIDITIS: ICD-10-CM

## 2024-03-27 DIAGNOSIS — R53.83 FATIGUE, UNSPECIFIED TYPE: Primary | ICD-10-CM

## 2024-03-27 PROBLEM — E03.9 ACQUIRED HYPOTHYROIDISM: Status: RESOLVED | Noted: 2018-04-02 | Resolved: 2024-03-27

## 2024-03-27 PROBLEM — M75.01 ADHESIVE CAPSULITIS OF RIGHT SHOULDER: Status: RESOLVED | Noted: 2018-06-08 | Resolved: 2024-03-27

## 2024-03-27 PROBLEM — G50.0 TRIGEMINAL NEURALGIA OF LEFT SIDE OF FACE: Status: RESOLVED | Noted: 2017-05-30 | Resolved: 2024-03-27

## 2024-03-27 PROBLEM — Z12.11 SPECIAL SCREENING FOR MALIGNANT NEOPLASM OF COLON: Status: RESOLVED | Noted: 2023-03-23 | Resolved: 2024-03-27

## 2024-03-27 PROBLEM — M25.572: Status: RESOLVED | Noted: 2023-06-22 | Resolved: 2024-03-27

## 2024-03-27 LAB
BASOPHILS # BLD AUTO: 0.03 X10(3) UL (ref 0–0.2)
BASOPHILS NFR BLD AUTO: 0.5 %
EOSINOPHIL # BLD AUTO: 0.22 X10(3) UL (ref 0–0.7)
EOSINOPHIL NFR BLD AUTO: 3.9 %
ERYTHROCYTE [DISTWIDTH] IN BLOOD BY AUTOMATED COUNT: 12.1 %
HCT VFR BLD AUTO: 40.9 %
HGB BLD-MCNC: 14.2 G/DL
IMM GRANULOCYTES # BLD AUTO: 0 X10(3) UL (ref 0–1)
IMM GRANULOCYTES NFR BLD: 0 %
LYMPHOCYTES # BLD AUTO: 1.75 X10(3) UL (ref 1–4)
LYMPHOCYTES NFR BLD AUTO: 31.2 %
MCH RBC QN AUTO: 32.3 PG (ref 26–34)
MCHC RBC AUTO-ENTMCNC: 34.7 G/DL (ref 31–37)
MCV RBC AUTO: 93.2 FL
MONOCYTES # BLD AUTO: 0.44 X10(3) UL (ref 0.1–1)
MONOCYTES NFR BLD AUTO: 7.8 %
NEUTROPHILS # BLD AUTO: 3.17 X10 (3) UL (ref 1.5–7.7)
NEUTROPHILS # BLD AUTO: 3.17 X10(3) UL (ref 1.5–7.7)
NEUTROPHILS NFR BLD AUTO: 56.6 %
PLATELET # BLD AUTO: 222 10(3)UL (ref 150–450)
RBC # BLD AUTO: 4.39 X10(6)UL
TSI SER-ACNC: 0.66 MIU/ML (ref 0.36–3.74)
VIT B12 SERPL-MCNC: 412 PG/ML (ref 193–986)
WBC # BLD AUTO: 5.6 X10(3) UL (ref 4–11)

## 2024-03-27 PROCEDURE — 3008F BODY MASS INDEX DOCD: CPT | Performed by: FAMILY MEDICINE

## 2024-03-27 PROCEDURE — 99214 OFFICE O/P EST MOD 30 MIN: CPT | Performed by: FAMILY MEDICINE

## 2024-03-27 PROCEDURE — 84443 ASSAY THYROID STIM HORMONE: CPT | Performed by: FAMILY MEDICINE

## 2024-03-27 PROCEDURE — 3079F DIAST BP 80-89 MM HG: CPT | Performed by: FAMILY MEDICINE

## 2024-03-27 PROCEDURE — 3074F SYST BP LT 130 MM HG: CPT | Performed by: FAMILY MEDICINE

## 2024-03-27 PROCEDURE — 82607 VITAMIN B-12: CPT | Performed by: FAMILY MEDICINE

## 2024-03-27 PROCEDURE — 85025 COMPLETE CBC W/AUTO DIFF WBC: CPT | Performed by: FAMILY MEDICINE

## 2024-03-27 NOTE — PROGRESS NOTES
Patient has felt fatigued for approximately 1 month.  She is treated for hypothyroidism taking 100 mcg 1 day and alternating with 88 mcg the next.  Her TSH in the fall was normal.  She wonders about her general health.  Her sister is now getting a monthly injection for interstitial lung disease.  She is previously been treated for melanoma.  Both of her sisters have thyroid disease as well.  There is history of stroke in the family.  She wonders about her risk for this.    PAST MEDICAL HISTORY:  Past Medical History:   Diagnosis Date    Acute frontal sinusitis     Acute maxillary sinusitis     Acute serous otitis media     Adhesive capsulitis of right shoulder     Arthralgia of toe, left     1st mcp joint    Bloating     Chronic cystic mastitis     Diarrhea     Diarrhea, unspecified     Flatulence/gas pain/belching     Heartburn     Hemorrhoids     Impacted cerumen     Normal delivery (HCC)     Osteoporosis     Other acute otitis externa     Personal history of traumatic brain injury     concussion    Thyroid disease     Trigeminal neuralgia of left side of face      PAST SURGICAL HISTORY:  Past Surgical History:   Procedure Laterality Date    OTHER SURGICAL HISTORY  10/16/2020    Incision and drainage of thrombosed external hemorrhoid     MEDICATIONS:  Current Outpatient Medications   Medication Sig Dispense Refill    triamcinolone 0.1 % External Ointment TAKE 1(ONE) APPLICATION(S) TOPICAL 2(TWO) TIMES A DAY TO AFFECTED AREAS ON BODY UNTIL RESOLVED.      estradiol 0.1 MG/GM Vaginal Cream Initial: 500 mg to 1 g/day intravaginally for 2 weeks, then 500 mg to 1 g one to three times per week 42.5 g 2    ALENDRONATE 70 MG Oral Tab TAKE 1 TABLET (70 MG TOTAL) BY MOUTH ONCE A WEEK 12 tablet 0    levothyroxine 100 MCG Oral Tab Take 1 tablet (100 mcg total) by mouth daily. 90 tablet 1    levothyroxine 88 MCG Oral Tab Take 1 tablet (88 mcg total) by mouth before breakfast. 90 tablet 1    albuterol 108 (90 Base) MCG/ACT  Inhalation Aero Soln Inhale 2 puffs into the lungs every 6 (six) hours as needed for Wheezing. 8.5 each 1    ondansetron 4 MG Oral Tablet Dispersible Take 1 tablet (4 mg total) by mouth every 8 (eight) hours as needed for Nausea. 20 tablet 0    Glucosamine-Chondroitin (MOVE FREE OR) Take by mouth.      Misc Natural Products (LUTEIN 20 OR) Take by mouth.      Calcium Citrate-Vitamin D (CITRACAL + D OR) Take by mouth.      Vitamin D3, Cholecalciferol, 25 MCG (1000 UT) Oral Tab Take 1 tablet (1,000 Units total) by mouth daily.      B Complex-C-Folic Acid Oral Tab Take by mouth.      clindamycin HCl 150 MG Oral Cap TAKE 4 CAPSULES BY MOUTH ONE HOUR PRIOR TO DENTAL APPT  0    Calcium Citrate (CITRACAL OR) Take by mouth.       ALLERGIES:   Allergy, Amoxicillin, Amoxicillin-pot clavulanate, Diazoxide, Epinephrine, Sulfamethoxazole w/trimethoprim, and Sulfamethoxazole-trimethoprim  FAMILY HISTORY  Family History   Problem Relation Age of Onset    Breast Cancer Mother 80    Heart Disease Father     Hypertension Father     Cancer Father         basal cell skin    Cancer Sister         basal cell    Thyroid Disorder Sister     Hypertension Sister     Arthritis Sister     Renal Disease Sister     Other (interstitial lung disease) Sister     Melanoma Sister     Thyroid Disorder Sister     Cancer Sister         basal cell skin    Arthritis Sister        PHYSICAL EXAM:  /80   Pulse 78   Resp 16   Ht 5' 6.5\" (1.689 m)   Wt 155 lb (70.3 kg)   SpO2 98%   BMI 24.64 kg/m²     Alert no acute distress breathing comfortably.  HEENT unremarkable.  Neck no thyromegaly no masses.  No lymphadenopathy.  Lymphadenopathy survey throughout the body is unremarkable.  Heart regular in rhythm normal S1-S2 no murmurs.  Lungs are clear.  Abdomen is benign.  Extremities 2+ pulses no edema.  I did not perform a full skin exam but she sees Dr. Villarreal twice a year and has an upcoming visit in June.    I calculated her [place] point score to  be 1% 10-year risk of heart attack or stroke.    ASSESSMENT/PLAN:    1. Fatigue, unspecified type  Her exam is reassuring that we are not missing any significant medical problems.  We will adjust her thyroid medicine if needed.  Follow-up on CBC if necessary.  She does not tolerate oral B12 but has tolerated injectable B12 if her level has gotten low again.  - TSH W Reflex To Free T4; Future  - CBC With Differential With Platelet; Future  - Vitamin B12; Future    2. Chronic lymphocytic thyroiditis  We discussed that the antibodies could attack the gland at any time making the thyroid disease suddenly worsened and caused her fatigue.  We will adjust the medicine only if needed based on the blood test result  - TSH W Reflex To Free T4; Future     Face-to-face time plus documentation greater than 30 minutes.    If this note is coded by time based on the Office/Outpatient Evaluation and Management Codes effective January 1, 2021, the time includes reviewing the chart before entering the exam room, the time spent with the patient in face to face discussion and examination, and the time documenting the visit.  It may also include time ordering tests or reviewing test results completed on the same day.

## 2024-04-19 ENCOUNTER — OFFICE VISIT (OUTPATIENT)
Dept: FAMILY MEDICINE CLINIC | Facility: CLINIC | Age: 64
End: 2024-04-19
Payer: COMMERCIAL

## 2024-04-19 VITALS
BODY MASS INDEX: 25 KG/M2 | OXYGEN SATURATION: 97 % | HEART RATE: 73 BPM | HEIGHT: 66.5 IN | RESPIRATION RATE: 15 BRPM | SYSTOLIC BLOOD PRESSURE: 130 MMHG | DIASTOLIC BLOOD PRESSURE: 80 MMHG

## 2024-04-19 DIAGNOSIS — L40.8 OTHER PSORIASIS: ICD-10-CM

## 2024-04-19 DIAGNOSIS — R21 RASH AND NONSPECIFIC SKIN ERUPTION: Primary | ICD-10-CM

## 2024-04-19 PROCEDURE — 99213 OFFICE O/P EST LOW 20 MIN: CPT | Performed by: FAMILY MEDICINE

## 2024-04-19 PROCEDURE — 3079F DIAST BP 80-89 MM HG: CPT | Performed by: FAMILY MEDICINE

## 2024-04-19 PROCEDURE — 3075F SYST BP GE 130 - 139MM HG: CPT | Performed by: FAMILY MEDICINE

## 2024-04-19 RX ORDER — PREDNISONE 10 MG/1
TABLET ORAL
Qty: 15 TABLET | Refills: 0 | Status: SHIPPED | OUTPATIENT
Start: 2024-04-19 | End: 2024-04-30

## 2024-04-19 NOTE — PROGRESS NOTES
Was in [place] and developed 2 small red lesions on the medial mid right calf.  Thought they could be bites because they were close together.  Upon returning home is developed many more on the calf.  Just in the last few days of very itchy patches developed on the distal medial right thigh.  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  She has a topical cream for this but is not really resolving.  There is no pain.  There is been no fevers or chills.    PAST MEDICAL HISTORY:  Past Medical History:    Acute frontal sinusitis    Acute maxillary sinusitis    Acute serous otitis media    Adhesive capsulitis of right shoulder    Arthralgia of toe, left    1st mcp joint    Bloating    Chronic cystic mastitis    Diarrhea    Diarrhea, unspecified    Flatulence/gas pain/belching    Heartburn    Hemorrhoids    Impacted cerumen    Normal delivery ([facility])    Osteoporosis    Other acute otitis externa    Personal history of traumatic brain injury    concussion    Thyroid disease    Trigeminal neuralgia of left side of face     PAST SURGICAL HISTORY:  Past Surgical History:   Procedure Laterality Date    Other surgical history  10/16/2020    Incision and drainage of thrombosed external hemorrhoid     MEDICATIONS:  Current Outpatient Medications   Medication Sig Dispense Refill    betamethasone 0.1 % External Cream Apply 1 Application topically 2 (two) times daily. 15 g 0    predniSONE 10 MG Oral Tab Take 2 tablets (20 mg total) by mouth daily for 5 days, THEN 1 tablet (10 mg total) daily for 3 days, THEN 0.5 tablets (5 mg total) daily for 3 days. 15 tablet 0    triamcinolone 0.1 % External Ointment TAKE 1(ONE) APPLICATION(S) TOPICAL 2(TWO) TIMES A DAY TO AFFECTED AREAS ON BODY UNTIL RESOLVED.      estradiol 0.1 MG/GM Vaginal Cream Initial: 500 mg to 1 g/day intravaginally for 2 weeks, then 500 mg to 1 g one to three times per week 42.5 g 2    ALENDRONATE 70 MG Oral Tab TAKE 1 TABLET (70 MG  TOTAL) BY MOUTH ONCE A WEEK 12 tablet 0    levothyroxine 100 MCG Oral Tab Take 1 tablet (100 mcg total) by mouth daily. 90 tablet 1    levothyroxine 88 MCG Oral Tab Take 1 tablet (88 mcg total) by mouth before breakfast. 90 tablet 1    albuterol 108 (90 Base) MCG/ACT Inhalation Aero Soln Inhale 2 puffs into the lungs every 6 (six) hours as needed for Wheezing. 8.5 each 1    ondansetron 4 MG Oral Tablet Dispersible Take 1 tablet (4 mg total) by mouth every 8 (eight) hours as needed for Nausea. 20 tablet 0    Glucosamine-Chondroitin (MOVE FREE OR) Take by mouth.      Misc Natural Products (LUTEIN 20 OR) Take by mouth.      Calcium Citrate-Vitamin D (CITRACAL + D OR) Take by mouth.      Vitamin D3, Cholecalciferol, 25 MCG (1000 UT) Oral Tab Take 1 tablet (1,000 Units total) by mouth daily.      B Complex-C-Folic Acid Oral Tab Take by mouth.      clindamycin HCl 150 MG Oral Cap TAKE 4 CAPSULES BY MOUTH ONE HOUR PRIOR TO DENTAL APPT  0    Calcium Citrate (CITRACAL OR) Take by mouth.       ALLERGIES:   Allergy, Amoxicillin, Amoxicillin-pot clavulanate, Diazoxide, Epinephrine, Sulfamethoxazole w/trimethoprim, and Sulfamethoxazole-trimethoprim  FAMILY HISTORY  Family History   Problem Relation Age of Onset    Breast Cancer Mother 80    Heart Disease Father     Hypertension Father     Cancer Father         basal cell skin    Cancer Sister         basal cell    Thyroid Disorder Sister     Hypertension Sister     Arthritis Sister     Renal Disease Sister     Other (interstitial lung disease) Sister     Melanoma Sister     Thyroid Disorder Sister     Cancer Sister         basal cell skin    Arthritis Sister        PHYSICAL EXAM:  /80   Pulse 73   Resp 15   Ht 5' 6.5\" (1.689 m)   SpO2 97%   BMI 24.64 kg/m²     Alert no acute distress breathing comfortably.  The rash on the left lateral calf is well-demarcated mildly raised fine scale consistent with guttate psoriasis.  There are small red dots on the medial right leg  they do appear to be open wounds and could be bites.  They are not warm to the touch.  They are moderately raised.  The patch in the distal medial right thigh is macular and clustered and may be a simple id reaction    ASSESSMENT/PLAN:    1. Rash and nonspecific skin eruption  Could be bites given that they started while in [place] where the noseeums are plentiful.  - predniSONE 10 MG Oral Tab; Take 2 tablets (20 mg total) by mouth daily for 5 days, THEN 1 tablet (10 mg total) daily for 3 days, THEN 0.5 tablets (5 mg total) daily for 3 days.  Dispense: 15 tablet; Refill: 0    2. Other psoriasis    - betamethasone 0.1 % External Cream; Apply 1 Application topically 2 (two) times daily.  Dispense: 15 g; Refill: 0         If this note is coded by time based on the Office/Outpatient Evaluation and Management Codes effective January 1, 2021, the time includes reviewing the chart before entering the exam room, the time spent with the patient in face to face discussion and examination, and the time documenting the visit.  It may also include time ordering tests or reviewing test results completed on the same day.

## 2024-04-26 ENCOUNTER — HOSPITAL ENCOUNTER (EMERGENCY)
Age: 64
Discharge: HOME | End: 2024-04-26
Attending: EMERGENCY MEDICINE
Payer: COMMERCIAL

## 2024-04-26 VITALS
WEIGHT: 150 LBS | HEIGHT: 66 IN | TEMPERATURE: 98 F | OXYGEN SATURATION: 99 % | SYSTOLIC BLOOD PRESSURE: 138 MMHG | HEART RATE: 77 BPM | RESPIRATION RATE: 16 BRPM | DIASTOLIC BLOOD PRESSURE: 90 MMHG | BODY MASS INDEX: 24.11 KG/M2

## 2024-04-26 DIAGNOSIS — J01.90 ACUTE SINUSITIS, RECURRENCE NOT SPECIFIED, UNSPECIFIED LOCATION: Primary | ICD-10-CM

## 2024-04-26 LAB
ATRIAL RATE: 78 BPM
P AXIS: 61 DEGREES
P-R INTERVAL: 148 MS
Q-T INTERVAL: 406 MS
QRS DURATION: 132 MS
QTC CALCULATION (BEZET): 462 MS
R AXIS: -24 DEGREES
T AXIS: 106 DEGREES
VENTRICULAR RATE: 78 BPM

## 2024-04-26 PROCEDURE — 99283 EMERGENCY DEPT VISIT LOW MDM: CPT

## 2024-04-26 PROCEDURE — 93010 ELECTROCARDIOGRAM REPORT: CPT

## 2024-04-26 PROCEDURE — 93005 ELECTROCARDIOGRAM TRACING: CPT

## 2024-04-26 RX ORDER — DOXYCYCLINE HYCLATE 100 MG/1
100 CAPSULE ORAL 2 TIMES DAILY
Qty: 20 CAPSULE | Refills: 0 | Status: SHIPPED | OUTPATIENT
Start: 2024-04-26 | End: 2024-04-29

## 2024-04-26 NOTE — ED PROVIDER NOTES
Patient Seen in: [facility] [place]      History     Chief Complaint   Patient presents with    Pain     Stated Complaint: jaw pain 2 days    Subjective:   HPI    This is a 64-year-old woman here for evaluation of left-sided facial pain over the past 2 days.  Did see her dentist yesterday, who thought her bite might be off, did some grinding her teeth and even her bite.  States her pain continues to worsen.  States it is more infraorbital on the left side, but feels like it radiates to her mandible as well.  Denies any specific dental pain any difficulty swallowing any fevers, any ear pain.  No recent antibiotics no other complaints at this time    Objective:   Past Medical History:    Acute frontal sinusitis    Acute maxillary sinusitis    Acute serous otitis media    Adhesive capsulitis of right shoulder    Arthralgia of toe, left    1st mcp joint    Bloating    Chronic cystic mastitis    Diarrhea    Diarrhea, unspecified    Flatulence/gas pain/belching    Heartburn    Hemorrhoids    Impacted cerumen    Normal delivery (HCC)    Osteoporosis    Other acute otitis externa    Personal history of traumatic brain injury    concussion    Thyroid disease    Trigeminal neuralgia of left side of face              Past Surgical History:   Procedure Laterality Date    Other surgical history  10/16/2020    Incision and drainage of thrombosed external hemorrhoid                Social History     Socioeconomic History    Marital status:    Tobacco Use    Smoking status: Never    Smokeless tobacco: Never   Vaping Use    Vaping status: Never Used   Substance and Sexual Activity    Alcohol use: Yes     Comment: social    Drug use: No   Other Topics Concern    Caffeine Concern Yes    Exercise Yes              Review of Systems    Positive for stated complaint: jaw pain 2 days  Other systems are as noted in HPI.  Constitutional and vital signs reviewed.      All other systems reviewed and negative  except as noted above.    Physical Exam     ED Triage Vitals   BP 04/26/24 0854 (!) 163/91   Pulse 04/26/24 0854 79   Resp 04/26/24 0854 16   Temp 04/26/24 0854 97.6 °F (36.4 °C)   Temp src 04/26/24 0854 Oral   SpO2 04/26/24 0854 99 %   O2 Device 04/26/24 0947 None (Room air)       Current:/90   Pulse 77   Temp 97.6 °F (36.4 °C) (Oral)   Resp 16   Ht 167.6 cm (5' 6\")   Wt 68 kg   SpO2 99%   BMI 24.21 kg/m²         Physical Exam        Physical Exam  Vitals signs and nursing note reviewed.   General: Well-appearing woman sitting in the bed in no acute distress  Head: Normocephalic and atraumatic.   HEENT:  Mucous membranes are moist.  Oropharynx is clear uvula is midline, TMs clear bilaterally, no pre or postauricular tenderness on either side.  There is slight nasal congestion, mild tenderness over the maxillary sinus on the left side, no significant cervical lymphadenopathy no trismus noted no dental percussion or palpation floor the mouth soft, no fluctuance surrounding any of the teeth on the left side.  Skin: Warm and dry  Neurological: Awake alert, speech is normal        ED Course   Labs Reviewed - No data to display  EKG    Rate, intervals and axes as noted on EKG Report.  Rate: 78  Rhythm: Sinus Rhythm  Reading: No acute ischemic changes.  Left bundle branch block present seen previously                     MDM      63-year-old woman here for evaluation of facial pain on the left side over the past 2 days.   is at bedside.  Saw her dentist who thought it might be an issue with her bite.  Differential includes trigeminal neuralgia, sinusitis, otitis media.  No evidence of infection in the ear or in the intraoral cavity, does have some maxillary tenderness on the left side, symptoms sound more consistent with sinusitis.  Discomfort seems to start from the sinus and radiate over the jaw rather than in the course of the trigeminal nerve itself.  Will discharge home with a prescription for  doxycycline x 10 days, follow-up with PMD return precautions discussed she is in agreement with plan.    Reviewed previous EKG from 8/18, left bundle branch block present                                   Medical Decision Making  Risk  Prescription drug management.        Disposition and Plan     Clinical Impression:  1. Acute sinusitis, recurrence not specified, unspecified location         Disposition:  Discharge  4/26/2024  9:33 am    Follow-up:  Gaston Lynn MD  [address] [place] [id number]  [phone]    Follow up  Follow-up with your PMD for reevaluation in 24 to 48 hours.  Return to ER if symptoms worsen or change or if any other new concerns.          Medications Prescribed:  Discharge Medication List as of 4/26/2024  9:41 AM        START taking these medications    Details   doxycycline 100 MG Oral Cap Take 1 capsule (100 mg total) by mouth 2 (two) times daily for 10 days., Normal, Disp-20 capsule, R-0

## 2024-04-26 NOTE — ED INITIAL ASSESSMENT (HPI)
Awoke Wednesday with pain to l upper teeth/jaw area- went to dentist and was told her bite was off and he \"shaved\" down the teeth- pain was a little better- did wear a mouth guard the last 2 nights which did help her sleep and decreased the pain- pain started to l cheek and upper l eye after getting up this morning

## 2024-04-29 ENCOUNTER — OFFICE VISIT (OUTPATIENT)
Dept: FAMILY MEDICINE CLINIC | Facility: CLINIC | Age: 64
End: 2024-04-29
Payer: COMMERCIAL

## 2024-04-29 VITALS
HEART RATE: 91 BPM | BODY MASS INDEX: 24.11 KG/M2 | DIASTOLIC BLOOD PRESSURE: 70 MMHG | OXYGEN SATURATION: 97 % | SYSTOLIC BLOOD PRESSURE: 128 MMHG | WEIGHT: 150 LBS | RESPIRATION RATE: 16 BRPM | HEIGHT: 66 IN

## 2024-04-29 DIAGNOSIS — G50.0 TRIGEMINAL NEURALGIA: Primary | ICD-10-CM

## 2024-04-29 PROCEDURE — 3078F DIAST BP <80 MM HG: CPT | Performed by: FAMILY MEDICINE

## 2024-04-29 PROCEDURE — 99213 OFFICE O/P EST LOW 20 MIN: CPT | Performed by: FAMILY MEDICINE

## 2024-04-29 PROCEDURE — 3008F BODY MASS INDEX DOCD: CPT | Performed by: FAMILY MEDICINE

## 2024-04-29 PROCEDURE — 3074F SYST BP LT 130 MM HG: CPT | Performed by: FAMILY MEDICINE

## 2024-04-29 RX ORDER — VALACYCLOVIR HYDROCHLORIDE 1 G/1
1000 TABLET, FILM COATED ORAL EVERY 12 HOURS SCHEDULED
Qty: 20 TABLET | Refills: 0 | Status: SHIPPED | OUTPATIENT
Start: 2024-04-29 | End: 2024-05-09

## 2024-04-29 NOTE — PROGRESS NOTES
Has had a rough year with a psoriasis rash and then what looked like a bite rash on the leg, thankfully this resolved with the topical medicine and she did not need the oral steroids.  She has been fatigued and we performed a workup last month which was unremarkable.  On Wednesday developed discomfort in the left side of the face.  Saw her dentist on Thursday who thought it might be due to malocclusion of the jaw.  X-rays of the teeth are unremarkable.  Friday pain was getting more significant, up to 8 out of 10, and she went to the ER.  No sinus congestion no rhinorrhea no fevers.  ER doctor thought it may be sinusitis given her tenderness over the maxillary sinus and prescribed antibiotics.  She does report that the discomfort has increased to include the forehead and chin as well.  She does have a history of trigeminal neuralgia we have treated in the past.  This seems different.  She typically gets tingling or numbness and now the opposite is occurring.  There is been no facial drooping.  No trouble with eye closing or smile.  She spent all weekend under a blanket because she felt cold, but there were no shaking chills.    PAST MEDICAL HISTORY:  Past Medical History:    Acute frontal sinusitis    Acute maxillary sinusitis    Acute serous otitis media    Adhesive capsulitis of right shoulder    Arthralgia of toe, left    1st mcp joint    Bloating    Chronic cystic mastitis    Diarrhea    Diarrhea, unspecified    Flatulence/gas pain/belching    Heartburn    Hemorrhoids    Impacted cerumen    Normal delivery (HCC)    Osteoporosis    Other acute otitis externa    Personal history of traumatic brain injury    concussion    Thyroid disease    Trigeminal neuralgia of left side of face     PAST SURGICAL HISTORY:  Past Surgical History:   Procedure Laterality Date    Other surgical history  10/16/2020    Incision and drainage of thrombosed external hemorrhoid     MEDICATIONS:  Current Outpatient Medications   Medication  Sig Dispense Refill    valACYclovir 1 G Oral Tab Take 1 tablet (1,000 mg total) by mouth every 12 (twelve) hours for 10 days. 20 tablet 0    betamethasone 0.1 % External Cream Apply 1 Application topically 2 (two) times daily. 15 g 0    predniSONE 10 MG Oral Tab Take 2 tablets (20 mg total) by mouth daily for 5 days, THEN 1 tablet (10 mg total) daily for 3 days, THEN 0.5 tablets (5 mg total) daily for 3 days. 15 tablet 0    triamcinolone 0.1 % External Ointment TAKE 1(ONE) APPLICATION(S) TOPICAL 2(TWO) TIMES A DAY TO AFFECTED AREAS ON BODY UNTIL RESOLVED.      estradiol 0.1 MG/GM Vaginal Cream Initial: 500 mg to 1 g/day intravaginally for 2 weeks, then 500 mg to 1 g one to three times per week 42.5 g 2    ALENDRONATE 70 MG Oral Tab TAKE 1 TABLET (70 MG TOTAL) BY MOUTH ONCE A WEEK 12 tablet 0    levothyroxine 100 MCG Oral Tab Take 1 tablet (100 mcg total) by mouth daily. 90 tablet 1    levothyroxine 88 MCG Oral Tab Take 1 tablet (88 mcg total) by mouth before breakfast. 90 tablet 1    albuterol 108 (90 Base) MCG/ACT Inhalation Aero Soln Inhale 2 puffs into the lungs every 6 (six) hours as needed for Wheezing. 8.5 each 1    ondansetron 4 MG Oral Tablet Dispersible Take 1 tablet (4 mg total) by mouth every 8 (eight) hours as needed for Nausea. 20 tablet 0    Glucosamine-Chondroitin (MOVE FREE OR) Take by mouth.      Misc Natural Products (LUTEIN 20 OR) Take by mouth.      Calcium Citrate-Vitamin D (CITRACAL + D OR) Take by mouth.      Vitamin D3, Cholecalciferol, 25 MCG (1000 UT) Oral Tab Take 1 tablet (1,000 Units total) by mouth daily.      B Complex-C-Folic Acid Oral Tab Take by mouth.      clindamycin HCl 150 MG Oral Cap   0    Calcium Citrate (CITRACAL OR) Take by mouth.       ALLERGIES:   Allergy, Amoxicillin, Amoxicillin-pot clavulanate, Diazoxide, Epinephrine, Sulfamethoxazole w/trimethoprim, and Sulfamethoxazole-trimethoprim  FAMILY HISTORY  Family History   Problem Relation Age of Onset    Breast Cancer  Mother 80    Heart Disease Father     Hypertension Father     Cancer Father         basal cell skin    Cancer Sister         basal cell    Thyroid Disorder Sister     Hypertension Sister     Arthritis Sister     Renal Disease Sister     Other (interstitial lung disease) Sister     Melanoma Sister     Thyroid Disorder Sister     Cancer Sister         basal cell skin    Arthritis Sister        PHYSICAL EXAM:  /70   Pulse 91   Resp 16   Ht 5' 6\" (1.676 m)   Wt 150 lb (68 kg)   SpO2 97%   BMI 24.21 kg/m²     Alert no acute distress breathing comfortably.  There is no facial or scalp rash identified.  Ear canal with some cerumen.  The portion of the eardrum I can see is clear.  Eyes pupils equal round reactive to light and accommodation.  Extraocular muscles intact.  Anicteric sclera pink conjunctiva.  Nose without erythema or discharge.  Mouth is clear.  Tongue is midline.  Cranial nerves II through XII are intact vertical the left.  Neck without lymphadenopathy.    ASSESSMENT/PLAN:    1. Trigeminal neuralgia  Continue antibiotics.  This does not appear to be sinusitis especially with the symptoms now including the forehead and chin as well as the cheek.  Finish the prednisone that she has started.  - valACYclovir 1 G Oral Tab; Take 1 tablet (1,000 mg total) by mouth every 12 (twelve) hours for 10 days.  Dispense: 20 tablet; Refill: 0  Let me know if any mechanical symptoms began such as unequal smile or trouble closing her left eye.      If this note is coded by time based on the Office/Outpatient Evaluation and Management Codes effective January 1, 2021, the time includes reviewing the chart before entering the exam room, the time spent with the patient in face to face discussion and examination, and the time documenting the visit.  It may also include time ordering tests or reviewing test results completed on the same day.

## 2024-05-02 DIAGNOSIS — M81.0 AGE-RELATED OSTEOPOROSIS WITHOUT CURRENT PATHOLOGICAL FRACTURE: ICD-10-CM

## 2024-05-02 DIAGNOSIS — T45.8X5S ADVERSE EFFECT OF BISPHOSPHONATE, SEQUELA: ICD-10-CM

## 2024-05-02 RX ORDER — ALENDRONATE SODIUM 70 MG/1
70 TABLET ORAL WEEKLY
Qty: 12 TABLET | Refills: 0 | Status: SHIPPED | OUTPATIENT
Start: 2024-05-02

## 2024-05-29 ENCOUNTER — HOSPITAL ENCOUNTER (OUTPATIENT)
Dept: MAMMOGRAPHY | Age: 64
Discharge: HOME | End: 2024-05-29
Attending: FAMILY MEDICINE
Payer: COMMERCIAL

## 2024-05-29 DIAGNOSIS — Z12.31 ENCOUNTER FOR SCREENING MAMMOGRAM FOR HIGH-RISK PATIENT: ICD-10-CM

## 2024-05-29 PROCEDURE — 77067 SCR MAMMO BI INCL CAD: CPT | Performed by: FAMILY MEDICINE

## 2024-05-29 PROCEDURE — 77063 BREAST TOMOSYNTHESIS BI: CPT | Performed by: FAMILY MEDICINE

## 2024-07-06 ENCOUNTER — HOSPITAL ENCOUNTER (OUTPATIENT)
Dept: MAMMOGRAPHY | Facility: HOSPITAL | Age: 64
Discharge: HOME | End: 2024-07-06
Attending: FAMILY MEDICINE
Payer: COMMERCIAL

## 2024-07-06 DIAGNOSIS — R92.30 DENSE BREAST: ICD-10-CM

## 2024-07-06 DIAGNOSIS — Z12.39 ENCOUNTER FOR BREAST CANCER SCREENING USING NON-MAMMOGRAM MODALITY: ICD-10-CM

## 2024-07-06 PROCEDURE — 76641 ULTRASOUND BREAST COMPLETE: CPT | Performed by: FAMILY MEDICINE

## 2024-07-15 NOTE — TELEPHONE ENCOUNTER
Pt called saying she missed Dr. Ashley Jules call last night and is calling back. TREATMENT PLAN (Medication Management Only)        [facility]    Name and Date of Birth:  Moreno Rivera 37 y.o. 1986  Date of Treatment Plan: July 15, 2024  Diagnosis/Diagnoses:    1. Other depression    2. Mood disorder (HCC)      Strengths/Personal Resources for Self-Care: supportive family, taking medications as prescribed, ability to adapt to life changes, ability to communicate needs.  Area/Areas of need (in own words): depressive symptoms  1. Long Term Goal: remission of depression.  Target Date:2 months - 9/15/2024  Person/Persons responsible for completion of goal: Moreno  2.  Short Term Objective (s) - How will we reach this goal?:   A. Provider new recommended medication/dosage changes and/or continue medication(s): continue current medications as prescribed.  B. N/A.  C. N/A.  Target Date:2 months - 9/15/2024  Person/Persons Responsible for Completion of Goal: Moreno  Progress Towards Goals: stable  Treatment Modality: medication management every 2 months  Review due 180 days from date of this plan: 6 months - 1/15/2025  Expected length of service: ongoing treatment  My Physician/PA/NP and I have developed this plan together and I agree to work on the goals and objectives. I understand the treatment goals that were developed for my treatment.

## 2024-10-14 ENCOUNTER — OFFICE VISIT (OUTPATIENT)
Dept: FAMILY MEDICINE CLINIC | Facility: CLINIC | Age: 64
End: 2024-10-14
Payer: COMMERCIAL

## 2024-10-14 VITALS
SYSTOLIC BLOOD PRESSURE: 122 MMHG | DIASTOLIC BLOOD PRESSURE: 78 MMHG | HEIGHT: 66 IN | WEIGHT: 157 LBS | HEART RATE: 83 BPM | BODY MASS INDEX: 25.23 KG/M2 | OXYGEN SATURATION: 98 %

## 2024-10-14 DIAGNOSIS — E03.9 HYPOTHYROIDISM, UNSPECIFIED: ICD-10-CM

## 2024-10-14 DIAGNOSIS — Z00.00 ANNUAL PHYSICAL EXAM: Primary | ICD-10-CM

## 2024-10-14 DIAGNOSIS — F41.9 ANXIETY: ICD-10-CM

## 2024-10-14 DIAGNOSIS — Z13.820 SCREENING FOR OSTEOPOROSIS: ICD-10-CM

## 2024-10-14 DIAGNOSIS — Z12.31 ENCOUNTER FOR SCREENING MAMMOGRAM FOR HIGH-RISK PATIENT: ICD-10-CM

## 2024-10-14 RX ORDER — ALPRAZOLAM 0.5 MG
0.5 TABLET ORAL 2 TIMES DAILY PRN
Qty: 6 TABLET | Refills: 0 | Status: SHIPPED | OUTPATIENT
Start: 2024-10-14

## 2024-10-14 RX ORDER — ONDANSETRON 4 MG/1
4 TABLET, ORALLY DISINTEGRATING ORAL EVERY 8 HOURS PRN
Qty: 20 TABLET | Refills: 0 | Status: SHIPPED | OUTPATIENT
Start: 2024-10-14

## 2024-10-14 RX ORDER — LEVOTHYROXINE SODIUM 100 UG/1
100 TABLET ORAL DAILY
Qty: 90 TABLET | Refills: 1 | Status: SHIPPED | OUTPATIENT
Start: 2024-10-14

## 2024-10-14 NOTE — PROGRESS NOTES
HPI:   Michelle Tadeo is a 64 year old female who presents for a complete physical exam.     Wt Readings from Last 6 Encounters:   10/14/24 157 lb (71.2 kg)   04/29/24 150 lb (68 kg)   04/26/24 150 lb (68 kg)   03/27/24 155 lb (70.3 kg)   02/15/24 154 lb (69.9 kg)   10/13/23 157 lb (71.2 kg)     Body mass index is 25.34 kg/m².     UTD last pap- HPV negative -2022  all previous paps normal  No vaginal discharge  Mild urge bladder dysfunction- using OTC meds   No vaginal bleeding, 2010 years postmenopausal  Some vaginal dryness and hot flashes  performing self breast exams  Utd mammogram  last bone density- 2022  calcium and vit D supplementation  UTD colonoscopy  + family history of breast cancer    S/p consult for osteoporosis - stopped fosamax in June / was on for 3 years and then 6 years before that ending in 2016     Lungs have been great / has bronchiectasis   No longer seeing  [facility]     TMJ dx in April 2024    Derm - yearly       Current Outpatient Medications   Medication Sig Dispense Refill    ALENDRONATE 70 MG Oral Tab TAKE 1 TABLET (70 MG TOTAL) BY MOUTH ONCE A WEEK (Patient not taking: Reported on 10/14/2024) 12 tablet 0    betamethasone 0.1 % External Cream Apply 1 Application topically 2 (two) times daily. 15 g 0    triamcinolone 0.1 % External Ointment TAKE 1(ONE) APPLICATION(S) TOPICAL 2(TWO) TIMES A DAY TO AFFECTED AREAS ON BODY UNTIL RESOLVED.      estradiol 0.1 MG/GM Vaginal Cream Initial: 500 mg to 1 g/day intravaginally for 2 weeks, then 500 mg to 1 g one to three times per week 42.5 g 2    levothyroxine 100 MCG Oral Tab Take 1 tablet (100 mcg total) by mouth daily. 90 tablet 1    levothyroxine 88 MCG Oral Tab Take 1 tablet (88 mcg total) by mouth before breakfast. 90 tablet 1    albuterol 108 (90 Base) MCG/ACT Inhalation Aero Soln Inhale 2 puffs into the lungs every 6 (six) hours as needed for Wheezing. 8.5 each 1    ondansetron 4 MG Oral Tablet Dispersible Take 1 tablet (4 mg total) by  mouth every 8 (eight) hours as needed for Nausea. 20 tablet 0    Glucosamine-Chondroitin (MOVE FREE OR) Take by mouth.      Misc Natural Products (LUTEIN 20 OR) Take by mouth.      Calcium Citrate-Vitamin D (CITRACAL + D OR) Take by mouth.      Vitamin D3, Cholecalciferol, 25 MCG (1000 UT) Oral Tab Take 1 tablet (1,000 Units total) by mouth daily.      B Complex-C-Folic Acid Oral Tab Take by mouth.      clindamycin HCl 150 MG Oral Cap   0    Calcium Citrate (CITRACAL OR) Take by mouth.        Past Medical History:    Acute frontal sinusitis    Acute maxillary sinusitis    Acute serous otitis media    Adhesive capsulitis of right shoulder    Allergic rhinitis    Arthralgia of toe, left    1st mcp joint    Bloating    Chronic cystic mastitis    Diarrhea    Diarrhea, unspecified    Flatulence/gas pain/belching    Heartburn    Hemorrhoids    Hypothyroidism    Impacted cerumen    Normal delivery (HCC)    Osteoporosis    Other acute otitis externa    Personal history of traumatic brain injury    concussion    Thyroid disease    Trigeminal neuralgia of left side of face      Past Surgical History:   Procedure Laterality Date    Colonoscopy        ,     Other surgical history  10/16/2020    Incision and drainage of thrombosed external hemorrhoid      Family History   Problem Relation Age of Onset    Breast Cancer Mother 80    Pulmonary Disease Mother     Heart Disease Father     Hypertension Father     Cancer Father         basal cell skin    Stroke Father     Cancer Sister         basal cell    Thyroid Disorder Sister     Hypertension Sister     Arthritis Sister     Renal Disease Sister     Other (interstitial lung disease) Sister     Melanoma Sister     Thyroid Disorder Sister     Cancer Sister         basal cell skin    Arthritis Sister     Pulmonary Disease Sister       Social History:   Social History     Socioeconomic History    Marital status:    Tobacco Use    Smoking status: Never     Smokeless tobacco: Never   Vaping Use    Vaping status: Never Used   Substance and Sexual Activity    Alcohol use: Yes     Alcohol/week: 2.0 standard drinks of alcohol     Types: 2 Glasses of wine per week     Comment: social    Drug use: Never   Other Topics Concern    Caffeine Concern No    Stress Concern No    Weight Concern No    Special Diet No    Exercise Yes    Seat Belt Yes     Occ:  : 2 Children .   Retired teacher / writing curriculum on line   Exercise:  5 times per week.  Diet: watches calories closely     REVIEW OF SYSTEMS:   GENERAL: feels well otherwise  SKIN: denies any unusual skin lesions  EYES:denies blurred vision or double vision  HEENT: denies nasal congestion, sinus pain or ST  LUNGS: denies shortness of breath with exertion  CARDIOVASCULAR: denies chest pain on exertion  GI: denies abdominal pain,denies heartburn  MUSCULOSKELETAL: denies back pain  NEURO: denies headaches  PSYCHE: denies depression or anxiety  HEMATOLOGIC: denies hx of anemia  ALL/ASTHMA: denies asthma    EXAM:   /78   Pulse 83   Ht 5' 6\" (1.676 m)   Wt 157 lb (71.2 kg)   SpO2 98%   BMI 25.34 kg/m²   Body mass index is 25.34 kg/m².   GENERAL: alert and oriented X 3, well developed, well nourished,in no apparent distress  CARDIO: RRR without murmur  LUNGS: clear to auscultation  NECK: supple,no adenopathy,no thyromegaly  HEENT: atraumatic, normocephalic,ears and throat are clear  EYES:PERRLA, EOMI, normal,conjunctiva are clear  SKIN: diffuse rash noted  GI: good BS's,no masses, HSM or tenderness  CHEST: no chest tenderness  BREAST: no axillary LAD, no masses no nipple discharge bilaterally  ((: external genitalia - no inguinal LAD, no lesions.   Speculum exam- introitus is normal,scant discharge,cervix is pink.   Bimanual exam- no adnexal masses or tenderness  RECTAL:good rectal tone,no mass, brown stool, stool is OB negative)) deferred   MUSCULOSKELETAL: back is not tender,FROM of the back  EXTREMITIES: no  cyanosis, clubbing or edema  NEURO: cranial nerves are intact,motor and sensory are grossly intact    ASSESSMENT AND PLAN:   Michelle Tadeo is a 64 year old female who presents with     1. Annual physical exam    - Physical Therapy Referral - [facility]  - Vitamin D [E]; Future  - Free T4, (Free Thyroxine); Future  - Assay, Thyroid Stim Hormone; Future  - Lipid Panel; Future  - CBC With Differential With Platelet; Future  - Vitamin B12; Future  - Comp Metabolic Panel (14); Future  - Hemoglobin A1C; Future    2. Encounter for screening mammogram for high-risk patient    - [facility] 2D+3D SCREENING BILAT (CPT=77067/64738); Future    3. Screening for osteoporosis    - XR DEXA BONE DENSITOMETRY (CPT=77080); Future    4. Hypothyroidism, unspecified    - levothyroxine 100 MCG Oral Tab; Take 1 tablet (100 mcg total) by mouth daily.  Dispense: 90 tablet; Refill: 1    5. Anxiety    - ALPRAZolam (XANAX) 0.5 MG Oral Tab; Take 1 tablet (0.5 mg total) by mouth 2 (two) times daily as needed.  Dispense: 6 tablet; Refill: 0        Discussed  diet, exercise,calcium, vitamin D, fish oil and self breast exams.   Questions answered and patient indicates understanding of these issues and agrees to the plan.  Follow up in 1 year or sooner if needed.

## 2024-10-15 ENCOUNTER — LAB ENCOUNTER (OUTPATIENT)
Dept: LAB | Age: 64
End: 2024-10-15
Attending: FAMILY MEDICINE
Payer: COMMERCIAL

## 2024-10-15 DIAGNOSIS — Z00.00 ANNUAL PHYSICAL EXAM: ICD-10-CM

## 2024-10-15 LAB
ALBUMIN SERPL-MCNC: 4.8 G/DL (ref 3.2–4.8)
ALBUMIN/GLOB SERPL: 2.2 {RATIO} (ref 1–2)
ALP LIVER SERPL-CCNC: 94 U/L
ALT SERPL-CCNC: 26 U/L
ANION GAP SERPL CALC-SCNC: 3 MMOL/L (ref 0–18)
AST SERPL-CCNC: 25 U/L (ref ?–34)
BASOPHILS # BLD AUTO: 0.01 X10(3) UL (ref 0–0.2)
BASOPHILS NFR BLD AUTO: 0.2 %
BILIRUB SERPL-MCNC: 1.3 MG/DL (ref 0.2–1.1)
BUN BLD-MCNC: 14 MG/DL (ref 9–23)
CALCIUM BLD-MCNC: 9.5 MG/DL (ref 8.7–10.4)
CHLORIDE SERPL-SCNC: 107 MMOL/L (ref 98–112)
CHOLEST SERPL-MCNC: 165 MG/DL (ref ?–200)
CO2 SERPL-SCNC: 28 MMOL/L (ref 21–32)
CREAT BLD-MCNC: 0.9 MG/DL
EGFRCR SERPLBLD CKD-EPI 2021: 72 ML/MIN/1.73M2 (ref 60–?)
EOSINOPHIL # BLD AUTO: 0.19 X10(3) UL (ref 0–0.7)
EOSINOPHIL NFR BLD AUTO: 3.6 %
ERYTHROCYTE [DISTWIDTH] IN BLOOD BY AUTOMATED COUNT: 12.4 %
EST. AVERAGE GLUCOSE BLD GHB EST-MCNC: 100 MG/DL (ref 68–126)
FASTING PATIENT LIPID ANSWER: YES
FASTING STATUS PATIENT QL REPORTED: YES
GLOBULIN PLAS-MCNC: 2.2 G/DL (ref 2–3.5)
GLUCOSE BLD-MCNC: 88 MG/DL (ref 70–99)
HBA1C MFR BLD: 5.1 % (ref ?–5.7)
HCT VFR BLD AUTO: 40.5 %
HDLC SERPL-MCNC: 60 MG/DL (ref 40–59)
HGB BLD-MCNC: 14.2 G/DL
IMM GRANULOCYTES # BLD AUTO: 0.03 X10(3) UL (ref 0–1)
IMM GRANULOCYTES NFR BLD: 0.6 %
LDLC SERPL CALC-MCNC: 81 MG/DL (ref ?–100)
LYMPHOCYTES # BLD AUTO: 1.19 X10(3) UL (ref 1–4)
LYMPHOCYTES NFR BLD AUTO: 22.3 %
MCH RBC QN AUTO: 33.2 PG (ref 26–34)
MCHC RBC AUTO-ENTMCNC: 35.1 G/DL (ref 31–37)
MCV RBC AUTO: 94.6 FL
MONOCYTES # BLD AUTO: 0.41 X10(3) UL (ref 0.1–1)
MONOCYTES NFR BLD AUTO: 7.7 %
NEUTROPHILS # BLD AUTO: 3.5 X10 (3) UL (ref 1.5–7.7)
NEUTROPHILS # BLD AUTO: 3.5 X10(3) UL (ref 1.5–7.7)
NEUTROPHILS NFR BLD AUTO: 65.6 %
NONHDLC SERPL-MCNC: 105 MG/DL (ref ?–130)
OSMOLALITY SERPL CALC.SUM OF ELEC: 286 MOSM/KG (ref 275–295)
PLATELET # BLD AUTO: 198 10(3)UL (ref 150–450)
POTASSIUM SERPL-SCNC: 4 MMOL/L (ref 3.5–5.1)
PROT SERPL-MCNC: 7 G/DL (ref 5.7–8.2)
RBC # BLD AUTO: 4.28 X10(6)UL
SODIUM SERPL-SCNC: 138 MMOL/L (ref 136–145)
T4 FREE SERPL-MCNC: 1.7 NG/DL (ref 0.8–1.7)
TRIGL SERPL-MCNC: 141 MG/DL (ref 30–149)
TSI SER-ACNC: 0.88 MIU/ML (ref 0.55–4.78)
VIT B12 SERPL-MCNC: 494 PG/ML (ref 211–911)
VIT D+METAB SERPL-MCNC: 54.6 NG/ML (ref 30–100)
VLDLC SERPL CALC-MCNC: 22 MG/DL (ref 0–30)
WBC # BLD AUTO: 5.3 X10(3) UL (ref 4–11)

## 2024-10-15 PROCEDURE — 80061 LIPID PANEL: CPT | Performed by: FAMILY MEDICINE

## 2024-10-15 PROCEDURE — 83036 HEMOGLOBIN GLYCOSYLATED A1C: CPT | Performed by: FAMILY MEDICINE

## 2024-10-15 PROCEDURE — 80050 GENERAL HEALTH PANEL: CPT | Performed by: FAMILY MEDICINE

## 2024-10-15 PROCEDURE — 82306 VITAMIN D 25 HYDROXY: CPT | Performed by: FAMILY MEDICINE

## 2024-10-15 PROCEDURE — 84439 ASSAY OF FREE THYROXINE: CPT | Performed by: FAMILY MEDICINE

## 2024-10-15 PROCEDURE — 82607 VITAMIN B-12: CPT | Performed by: FAMILY MEDICINE

## 2024-10-16 ENCOUNTER — PATIENT MESSAGE (OUTPATIENT)
Dept: FAMILY MEDICINE CLINIC | Facility: CLINIC | Age: 64
End: 2024-10-16

## 2024-11-25 DIAGNOSIS — E03.9 HYPOTHYROIDISM, UNSPECIFIED: ICD-10-CM

## 2024-11-25 RX ORDER — LEVOTHYROXINE SODIUM 88 UG/1
88 TABLET ORAL
Qty: 90 TABLET | Refills: 1 | Status: SHIPPED | OUTPATIENT
Start: 2024-11-25

## 2024-12-05 ENCOUNTER — IMMUNIZATION (OUTPATIENT)
Dept: FAMILY MEDICINE CLINIC | Facility: CLINIC | Age: 64
End: 2024-12-05
Payer: COMMERCIAL

## 2024-12-05 DIAGNOSIS — Z23 NEED FOR INFLUENZA VACCINATION: Primary | ICD-10-CM

## 2024-12-05 PROCEDURE — 90471 IMMUNIZATION ADMIN: CPT | Performed by: PHYSICIAN ASSISTANT

## 2024-12-05 PROCEDURE — 90656 IIV3 VACC NO PRSV 0.5 ML IM: CPT | Performed by: PHYSICIAN ASSISTANT

## 2025-02-17 ENCOUNTER — OFFICE VISIT (OUTPATIENT)
Dept: FAMILY MEDICINE CLINIC | Facility: CLINIC | Age: 65
End: 2025-02-17
Payer: COMMERCIAL

## 2025-02-17 VITALS
HEART RATE: 67 BPM | OXYGEN SATURATION: 98 % | TEMPERATURE: 98 F | BODY MASS INDEX: 24.14 KG/M2 | RESPIRATION RATE: 15 BRPM | SYSTOLIC BLOOD PRESSURE: 140 MMHG | DIASTOLIC BLOOD PRESSURE: 84 MMHG | WEIGHT: 152 LBS | HEIGHT: 66.5 IN

## 2025-02-17 DIAGNOSIS — R09.81 NASAL CONGESTION: Primary | ICD-10-CM

## 2025-02-17 DIAGNOSIS — H61.22 LEFT EAR IMPACTED CERUMEN: ICD-10-CM

## 2025-02-17 PROCEDURE — 99213 OFFICE O/P EST LOW 20 MIN: CPT

## 2025-02-17 PROCEDURE — 3008F BODY MASS INDEX DOCD: CPT

## 2025-02-17 PROCEDURE — 3079F DIAST BP 80-89 MM HG: CPT

## 2025-02-17 PROCEDURE — 3077F SYST BP >= 140 MM HG: CPT

## 2025-02-17 NOTE — PROGRESS NOTES
Subjective:   Patient ID: Michelle Tadeo is a 64 year old female.    Patient presents to clinic with 1 week of nasal congestion and 1 day of left ear pain. Taking motrin for symptoms.    Ear Pain   There is pain in the left ear. This is a new problem. The current episode started yesterday. The problem has been unchanged. There has been no fever. Associated symptoms include rhinorrhea. She has tried NSAIDs for the symptoms.       History/Other:   Review of Systems   Constitutional:  Negative for fever.   HENT:  Positive for ear pain and rhinorrhea.    All other systems reviewed and are negative.    Current Outpatient Medications   Medication Sig Dispense Refill    levothyroxine 88 MCG Oral Tab Take 1 tablet (88 mcg total) by mouth before breakfast. 90 tablet 1    levothyroxine 100 MCG Oral Tab Take 1 tablet (100 mcg total) by mouth daily. 90 tablet 1    ondansetron 4 MG Oral Tablet Dispersible Take 1 tablet (4 mg total) by mouth every 8 (eight) hours as needed for Nausea. 20 tablet 0    ALPRAZolam (XANAX) 0.5 MG Oral Tab Take 1 tablet (0.5 mg total) by mouth 2 (two) times daily as needed. 6 tablet 0    betamethasone 0.1 % External Cream Apply 1 Application topically 2 (two) times daily. 15 g 0    triamcinolone 0.1 % External Ointment TAKE 1(ONE) APPLICATION(S) TOPICAL 2(TWO) TIMES A DAY TO AFFECTED AREAS ON BODY UNTIL RESOLVED.      estradiol 0.1 MG/GM Vaginal Cream Initial: 500 mg to 1 g/day intravaginally for 2 weeks, then 500 mg to 1 g one to three times per week 42.5 g 2    albuterol 108 (90 Base) MCG/ACT Inhalation Aero Soln Inhale 2 puffs into the lungs every 6 (six) hours as needed for Wheezing. 8.5 each 1    Glucosamine-Chondroitin (MOVE FREE OR) Take by mouth.      Misc Natural Products (LUTEIN 20 OR) Take by mouth.      Calcium Citrate-Vitamin D (CITRACAL + D OR) Take by mouth.      Vitamin D3, Cholecalciferol, 25 MCG (1000 UT) Oral Tab Take 1 tablet (1,000 Units total) by mouth daily.      B  Complex-C-Folic Acid Oral Tab Take by mouth.      clindamycin HCl 150 MG Oral Cap   0    Calcium Citrate (CITRACAL OR) Take by mouth.       Allergies:Allergies[1]    Objective:   Physical Exam  Vitals reviewed.   Constitutional:       General: She is not in acute distress.     Appearance: Normal appearance. She is not ill-appearing or toxic-appearing.   HENT:      Head: Normocephalic and atraumatic.      Right Ear: Tympanic membrane, ear canal and external ear normal.      Left Ear: Tympanic membrane, ear canal and external ear normal.      Ears:      Comments: Left EAC with impacted cerumen. Removed with lighted curette.     Nose: Nose normal.      Mouth/Throat:      Mouth: Mucous membranes are moist.      Pharynx: Oropharynx is clear. No posterior oropharyngeal erythema.   Cardiovascular:      Rate and Rhythm: Normal rate and regular rhythm.      Pulses: Normal pulses.      Heart sounds: Normal heart sounds.   Pulmonary:      Effort: Pulmonary effort is normal. No respiratory distress.      Breath sounds: Normal breath sounds. No wheezing, rhonchi or rales.   Musculoskeletal:         General: Normal range of motion.      Cervical back: Normal range of motion and neck supple.   Lymphadenopathy:      Cervical: No cervical adenopathy.   Skin:     General: Skin is warm and dry.      Capillary Refill: Capillary refill takes less than 2 seconds.   Neurological:      General: No focal deficit present.      Mental Status: She is alert and oriented to person, place, and time.   Psychiatric:         Mood and Affect: Mood normal.         Behavior: Behavior normal.         Assessment & Plan:   1. Nasal congestion    2. Left ear impacted cerumen        Discussion about supportive treatment including over the counter medications, hydration and rest.      Meds This Visit:  Requested Prescriptions      No prescriptions requested or ordered in this encounter       Imaging & Referrals:  None         [1]   Allergies  Allergen  Reactions    Allergy      Cairpad MISC - per patient not allergic , does not know what this is.    Amoxicillin DIARRHEA    Amoxicillin-Pot Clavulanate DIARRHEA     SUSR    Diazoxide      POWD    Epinephrine PALPITATIONS     SOLN    Sulfamethoxazole W/Trimethoprim UNKNOWN     SOLN    Sulfamethoxazole-Trimethoprim      SOLN

## 2025-05-30 ENCOUNTER — OFFICE VISIT (OUTPATIENT)
Dept: FAMILY MEDICINE CLINIC | Facility: CLINIC | Age: 65
End: 2025-05-30
Payer: COMMERCIAL

## 2025-05-30 ENCOUNTER — HOSPITAL ENCOUNTER (OUTPATIENT)
Dept: BONE DENSITY | Age: 65
Discharge: HOME | End: 2025-05-30
Attending: FAMILY MEDICINE
Payer: COMMERCIAL

## 2025-05-30 ENCOUNTER — HOSPITAL ENCOUNTER (OUTPATIENT)
Dept: MAMMOGRAPHY | Age: 65
Discharge: HOME | End: 2025-05-30
Attending: FAMILY MEDICINE
Payer: COMMERCIAL

## 2025-05-30 VITALS
DIASTOLIC BLOOD PRESSURE: 83 MMHG | HEIGHT: 66 IN | BODY MASS INDEX: 24.56 KG/M2 | WEIGHT: 152.81 LBS | RESPIRATION RATE: 18 BRPM | OXYGEN SATURATION: 97 % | TEMPERATURE: 97 F | SYSTOLIC BLOOD PRESSURE: 133 MMHG | HEART RATE: 75 BPM

## 2025-05-30 DIAGNOSIS — Z13.820 SCREENING FOR OSTEOPOROSIS: ICD-10-CM

## 2025-05-30 DIAGNOSIS — M54.50 ACUTE MIDLINE LOW BACK PAIN WITHOUT SCIATICA: Primary | ICD-10-CM

## 2025-05-30 DIAGNOSIS — Z12.31 ENCOUNTER FOR SCREENING MAMMOGRAM FOR HIGH-RISK PATIENT: ICD-10-CM

## 2025-05-30 PROCEDURE — 77067 SCR MAMMO BI INCL CAD: CPT | Performed by: FAMILY MEDICINE

## 2025-05-30 PROCEDURE — 3008F BODY MASS INDEX DOCD: CPT | Performed by: NURSE PRACTITIONER

## 2025-05-30 PROCEDURE — 77063 BREAST TOMOSYNTHESIS BI: CPT | Performed by: FAMILY MEDICINE

## 2025-05-30 PROCEDURE — 99213 OFFICE O/P EST LOW 20 MIN: CPT | Performed by: NURSE PRACTITIONER

## 2025-05-30 PROCEDURE — 3079F DIAST BP 80-89 MM HG: CPT | Performed by: NURSE PRACTITIONER

## 2025-05-30 PROCEDURE — 77080 DXA BONE DENSITY AXIAL: CPT | Performed by: FAMILY MEDICINE

## 2025-05-30 PROCEDURE — 3075F SYST BP GE 130 - 139MM HG: CPT | Performed by: NURSE PRACTITIONER

## 2025-05-30 RX ORDER — CYCLOBENZAPRINE HCL 5 MG
10 TABLET ORAL 3 TIMES DAILY PRN
Qty: 15 TABLET | Refills: 0 | Status: SHIPPED | OUTPATIENT
Start: 2025-05-30

## 2025-05-30 NOTE — PROGRESS NOTES
CHIEF COMPLAINT:     Chief Complaint   Patient presents with    Back Pain     Entered by patient  Lower back pain, sharp when getting up to stand and dull pain, started Wednesday, overworked and created a muscle spasm has worsened, has take motrin and back pads.        HPI:   Michelle Tadeo is a 64 year old female who is here for complaints of back pain.  Reports 2 days ago did strength training and then went home and did some gardening. That evening felt muscle spasms starting in the center low back. Took ibuprofen and used heating pads. Felt somewhat better yesterday, went to play golf. Today pain/ spasm returned. Pain is described as aching/ pressure. Moderate in severity. Pain is worse with changing positions and sitting. Pain does not radiate. Denies trauma or injury.  Denies arsenio loss of bowel or bladder control.    Current Medications[1]   Past Medical History[2]   Social History:  Short Social Hx on File[3]     REVIEW OF SYSTEMS:   GENERAL: feels well otherwise  SKIN: denies any unusual skin lesions  LUNGS: denies shortness of breath   CARDIOVASCULAR: denies chest pain or palpitations  GI: denies abdominal pain, N/VC/D.  Denies heartburn  : no dysuria, urgency or flank pain.  MUSCULOSKELETAL: Per HPI.  No other joints are affected  NEURO: No numbness or tingling.  No loss of bowel or bladder control.    EXAM:   /83 (BP Location: Left arm, Patient Position: Sitting)   Pulse 75   Temp 97.4 °F (36.3 °C) (Temporal)   Resp 18   Ht 5' 6\" (1.676 m)   Wt 152 lb 12.8 oz (69.3 kg)   SpO2 97%   BMI 24.66 kg/m²    GENERAL: well developed, well nourished,in no apparent distress  SKIN: no rashes,no suspicious lesions  NECK: supple,no adenopathy,no bruits  LUNGS: clear to auscultation  CARDIO: RRR without murmur  GI: normoactive bs x4, no masses, HSM or tenderness  EXTREMITIES: no cyanosis, clubbing or edema  BACK:+ muscle tightness and tenderness with palpation of lumbosacral area. Increased pain with  change in position.   NEURO: patellar DTR's intact and equal bilaterally.  Sensation intact.    ASSESSMENT:   Michelle Tadeo is a 64 year old female who presents with complaints of back pain   Findings are consistent with   Encounter Diagnosis   Name Primary?    Acute midline low back pain without sciatica Yes         PLAN:   Comfort care as listed in patient instructions. To continue NSAID. Offered naproxen, pt wishes to continue the ibuprofen she has at home. She wishes to start muscle relaxer as below. Discussed risks/ benefits of this medication. Advised muscle relaxer can cause sedation, not to drive or drink alcohol while on this medicaiton. Pt verbalized understanding. Advised follow up with pcp next week if not improving. ER precautions given.   Requested Prescriptions     Signed Prescriptions Disp Refills    cyclobenzaprine 5 MG Oral Tab 15 tablet 0     Sig: Take 2 tablets (10 mg total) by mouth 3 (three) times daily as needed for Muscle spasms.     Risks, benefits, side effects of medication explained and discussed.     Patient Instructions   Continue ibuprofen for pain over the next week. 600mg every 6-8 hours. Take with food.   Cyclobenzaprine as prescribed. This medication can cause sedation. Do not drive or drink alcohol while on this medication.   Follow up with your primary care doctor if not improving over the next week  ER for any severe pain, numbness, weakness, or loss of bowel/ bladder control.     The patient indicates understanding of these issues and agrees to the plan.  The patient is asked to return if sx's persist or worsen.        [1]   Current Outpatient Medications   Medication Sig Dispense Refill    cyclobenzaprine 5 MG Oral Tab Take 2 tablets (10 mg total) by mouth 3 (three) times daily as needed for Muscle spasms. 15 tablet 0    levothyroxine 88 MCG Oral Tab Take 1 tablet (88 mcg total) by mouth before breakfast. 90 tablet 1    levothyroxine 100 MCG Oral Tab Take 1 tablet (100  mcg total) by mouth daily. 90 tablet 1    ondansetron 4 MG Oral Tablet Dispersible Take 1 tablet (4 mg total) by mouth every 8 (eight) hours as needed for Nausea. 20 tablet 0    ALPRAZolam (XANAX) 0.5 MG Oral Tab Take 1 tablet (0.5 mg total) by mouth 2 (two) times daily as needed. 6 tablet 0    betamethasone 0.1 % External Cream Apply 1 Application topically 2 (two) times daily. 15 g 0    triamcinolone 0.1 % External Ointment TAKE 1(ONE) APPLICATION(S) TOPICAL 2(TWO) TIMES A DAY TO AFFECTED AREAS ON BODY UNTIL RESOLVED.      estradiol 0.1 MG/GM Vaginal Cream Initial: 500 mg to 1 g/day intravaginally for 2 weeks, then 500 mg to 1 g one to three times per week 42.5 g 2    albuterol 108 (90 Base) MCG/ACT Inhalation Aero Soln Inhale 2 puffs into the lungs every 6 (six) hours as needed for Wheezing. 8.5 each 1    Glucosamine-Chondroitin (MOVE FREE OR) Take by mouth.      Misc Natural Products (LUTEIN 20 OR) Take by mouth.      Calcium Citrate-Vitamin D (CITRACAL + D OR) Take by mouth.      Vitamin D3, Cholecalciferol, 25 MCG (1000 UT) Oral Tab Take 1 tablet (1,000 Units total) by mouth daily.      B Complex-C-Folic Acid Oral Tab Take by mouth.      clindamycin HCl 150 MG Oral Cap   0    Calcium Citrate (CITRACAL OR) Take by mouth.     [2]   Past Medical History:   Acute frontal sinusitis    Acute maxillary sinusitis    Acute serous otitis media    Adhesive capsulitis of right shoulder    Allergic rhinitis    Arthralgia of toe, left    1st mcp joint    Bloating    Chronic cystic mastitis    Diarrhea    Diarrhea, unspecified    Flatulence/gas pain/belching    Heartburn    Hemorrhoids    Hypothyroidism    Impacted cerumen    Normal delivery (HCC)    Osteoporosis    Other acute otitis externa    Personal history of traumatic brain injury    concussion    Thyroid disease    Trigeminal neuralgia of left side of face   [3]   Social History  Socioeconomic History    Marital status:    Tobacco Use    Smoking status: Never     Smokeless tobacco: Never   Vaping Use    Vaping status: Never Used   Substance and Sexual Activity    Alcohol use: Yes     Alcohol/week: 2.0 standard drinks of alcohol     Types: 2 Glasses of wine per week     Comment: social    Drug use: Never   Other Topics Concern    Caffeine Concern No    Stress Concern No    Weight Concern No    Special Diet No    Exercise Yes    Seat Belt Yes

## 2025-05-30 NOTE — PATIENT INSTRUCTIONS
Continue ibuprofen for pain over the next week. 600mg every 6-8 hours. Take with food.   Cyclobenzaprine as prescribed. This medication can cause sedation. Do not drive or drink alcohol while on this medication.   Follow up with your primary care doctor if not improving over the next week  ER for any severe pain, numbness, weakness, or loss of bowel/ bladder control.

## 2025-07-31 DIAGNOSIS — E03.9 HYPOTHYROIDISM, UNSPECIFIED: ICD-10-CM

## 2025-07-31 RX ORDER — LEVOTHYROXINE SODIUM 88 UG/1
88 TABLET ORAL
Qty: 90 TABLET | Refills: 1 | Status: SHIPPED | OUTPATIENT
Start: 2025-07-31

## 2025-08-02 DIAGNOSIS — E03.9 HYPOTHYROIDISM, UNSPECIFIED: ICD-10-CM

## 2025-08-04 RX ORDER — LEVOTHYROXINE SODIUM 100 UG/1
100 TABLET ORAL DAILY
Qty: 90 TABLET | Refills: 1 | Status: SHIPPED | OUTPATIENT
Start: 2025-08-04

## 2025-08-11 ENCOUNTER — HOSPITAL ENCOUNTER (OUTPATIENT)
Age: 65
Discharge: HOME | End: 2025-08-11

## 2025-08-11 ENCOUNTER — APPOINTMENT (OUTPATIENT)
Dept: GENERAL RADIOLOGY | Age: 65
End: 2025-08-11
Attending: NURSE PRACTITIONER

## 2025-08-11 VITALS
RESPIRATION RATE: 20 BRPM | DIASTOLIC BLOOD PRESSURE: 89 MMHG | OXYGEN SATURATION: 98 % | HEART RATE: 79 BPM | TEMPERATURE: 98 F | SYSTOLIC BLOOD PRESSURE: 141 MMHG

## 2025-08-11 DIAGNOSIS — M53.3 PAIN OF LEFT SACROILIAC JOINT: Primary | ICD-10-CM

## 2025-08-11 PROCEDURE — 99213 OFFICE O/P EST LOW 20 MIN: CPT

## 2025-08-11 PROCEDURE — 99214 OFFICE O/P EST MOD 30 MIN: CPT

## 2025-08-11 PROCEDURE — 72202 X-RAY EXAM SI JOINTS 3/> VWS: CPT | Performed by: NURSE PRACTITIONER

## 2025-08-11 RX ORDER — PREDNISONE 20 MG/1
40 TABLET ORAL ONCE
Status: COMPLETED | OUTPATIENT
Start: 2025-08-11 | End: 2025-08-11

## 2025-08-11 RX ORDER — PREDNISONE 20 MG/1
40 TABLET ORAL DAILY
Qty: 8 TABLET | Refills: 0 | Status: SHIPPED | OUTPATIENT
Start: 2025-08-11 | End: 2025-08-15

## 2025-08-11 RX ORDER — CYCLOBENZAPRINE HCL 10 MG
10 TABLET ORAL 3 TIMES DAILY PRN
Qty: 20 TABLET | Refills: 0 | Status: SHIPPED | OUTPATIENT
Start: 2025-08-11 | End: 2025-08-18

## 2025-08-14 ENCOUNTER — OFFICE VISIT (OUTPATIENT)
Dept: FAMILY MEDICINE CLINIC | Facility: CLINIC | Age: 65
End: 2025-08-14

## 2025-08-14 VITALS
DIASTOLIC BLOOD PRESSURE: 76 MMHG | RESPIRATION RATE: 17 BRPM | WEIGHT: 155.19 LBS | OXYGEN SATURATION: 96 % | BODY MASS INDEX: 25 KG/M2 | SYSTOLIC BLOOD PRESSURE: 114 MMHG | HEART RATE: 87 BPM

## 2025-08-14 DIAGNOSIS — M54.41 CHRONIC RIGHT-SIDED LOW BACK PAIN WITH RIGHT-SIDED SCIATICA: Primary | ICD-10-CM

## 2025-08-14 DIAGNOSIS — G89.29 CHRONIC RIGHT-SIDED LOW BACK PAIN WITH RIGHT-SIDED SCIATICA: Primary | ICD-10-CM

## 2025-08-14 DIAGNOSIS — M81.0 AGE-RELATED OSTEOPOROSIS WITHOUT CURRENT PATHOLOGICAL FRACTURE: ICD-10-CM

## 2025-08-14 PROCEDURE — 3074F SYST BP LT 130 MM HG: CPT | Performed by: PHYSICIAN ASSISTANT

## 2025-08-14 PROCEDURE — 99214 OFFICE O/P EST MOD 30 MIN: CPT | Performed by: PHYSICIAN ASSISTANT

## 2025-08-14 PROCEDURE — 3078F DIAST BP <80 MM HG: CPT | Performed by: PHYSICIAN ASSISTANT

## (undated) DIAGNOSIS — M81.0 AGE-RELATED OSTEOPOROSIS WITHOUT CURRENT PATHOLOGICAL FRACTURE: Primary | ICD-10-CM

## (undated) DIAGNOSIS — Z79.83 LONG TERM USE OF BISPHOSPHONATES: ICD-10-CM

## (undated) DIAGNOSIS — Z79.899 ENCOUNTER FOR LONG-TERM (CURRENT) USE OF HIGH-RISK MEDICATION: ICD-10-CM

## (undated) DIAGNOSIS — E03.9 HYPOTHYROIDISM, UNSPECIFIED: ICD-10-CM

## (undated) NOTE — ED AVS SNAPSHOT
Ragini Wade   MRN: [id number]    Department:  [facility] in [place]   Date of Visit:  5/18/2018           Disclosure     Insurance plans vary and the physician(s) referred by the ER may not be covered by your plan.  Please conta tell this physician (or your personal doctor if your instructions are to return to your personal doctor) about any new or lasting problems. The primary care or specialist physician will see patients referred from the [facility] Emergency Department.  Bjorn Hogan

## (undated) NOTE — LETTER
6/29/2021        Referring:  No referring provider defined for this encounter. Dear 'zeeshan Gama: Thank you for referring your patient to me for an evaluation. Please see my attached note for my findings and recommendations.  Should you through menopause in [address] [place] [address].    Was on boniva from 2009 to 2014      Denies current alopecia, malar rash, photosensitivity rash, discoid lesions, oral/nasal ulcers, pleuritic chest pain, arthritis, seizures/psychosis, Raynaud's, dry eyes/mouth, miscarriage Thyroid Disorder Sister    • Hypertension Sister    • Arthritis Sister    • Thyroid Disorder Sister    • Cancer Sister         basal cell skin   • Arthritis Sister    • Breast Cancer Mother [de-identified]     Social History:  Social History    Tobacco Use      Smoking erythema, or increased warmth. Shoulders- FROM, abduction ~180 degrees bilaterally. Knees- No swelling, erythema, or increased warmth. AROM flexion/extension ~0-180 degrees. No valgus/varus laxities appreciated.    Ankles/Feet- No swelling, erythema results found for: C3, C4  No results found for: DRVVT, LAINT, PTTLUPUS, LUPUSINTERP, LA, [id number], [id number], [id number], [id number]  No results found for: Ayaka Wagner, [address] [facility], [address], [place]      Additional Labs:      12/29/15: examination:     -1.2%.                 FEMORAL NECK ANALYSIS RESULTS:            Femoral neck T-Score:     -1. 9                     2011 DEXA  Lumbar Bone Density  Bone mineral density (BMD) (g/cm2): 0.863  Lumbar T-Score: -1.7  % young normals: 82  TOTAL for osteoporosis at the moment but is still open to the idea.   Will obtain bone turnover markers to provide further granularity on the patient's bone resorption and formation to help guide decision making.  -patient sees dentist [id number] and no issues with BY ECIA; Future  -     TISSUE TRANSGLUTAMINASE AB IGG; Future  -     TISSUE TRANSGLUTAMINASE AB IGA; Future    Early menopause        No follow-ups on file. The above plan of care, diagnosis, orders, and follow-up were discussed with the patient.  Edd Mckeon

## (undated) NOTE — LETTER
Date: 5/6/2019    Patient Name: Tyler Ferreira          To Whom it may concern: This letter has been written at the patient's request. The above patient was seen at the [facility] for treatment of a medical condition.     This patient becca

## (undated) NOTE — MR AVS SNAPSHOT
[facility] [address] [facility]  [address] [facility]  [place] [address]  [phone]               Thank you for choosing us for your health care visit with TIMOTHY Paula. We are glad to serve you and happy to provide you with this summary of your visit.   Please he phenylephrine or oxymetazoline. First blow the nose gently. Then use the spray. Do not use these medicines more often than directed on the label or symptoms may get worse. You may also use tablets containing pseudoephedrine.  Avoid products that combine ing Allergies as of Mar 04, 2017     Allergy     Cairpad MISC    Amoxicillin Diarrhea    Amoxicillin-Pot Clavulanate Diarrhea    SUSR    Diazoxide     POWD    Epinephrine Palpitations    SOLN    Milk     Shellfish     Sulfamethoxazole-Trimethoprim     SOLN If you've recently had a stay at the Hospital you can access your discharge instructions in [place] by going to Visits < Admission Summaries.  If you've been to the Emergency Department or your doctor's office, you can view your past visit information in My

## (undated) NOTE — LETTER
Date: 4/2/2018    Patient Name: Henna Cross          To Whom it may concern: This letter has been written at the patient's request. The above patient was seen at the [facility] for treatment of a medical condition.     This patient becca

## (undated) NOTE — LETTER
10/27/20    Patient: Ragini Wade  : 9/3/1960 Visit date: 10/27/2020    Dear  Warden Jose MD    Thank you for referring Ragini Wade to my practice. Please find my assessment and plan below.     History of Present Illness    Today, I am see

## (undated) NOTE — ED AVS SNAPSHOT
Codie Wilson   MRN: [id number]    Department:  THE [facility] Emergency Department in [place]   Date of Visit:  8/6/2018           Disclosure     Insurance plans vary and the physician(s) referred by the ER may not be covered by your plan.  Please contac tell this physician (or your personal doctor if your instructions are to return to your personal doctor) about any new or lasting problems. The primary care or specialist physician will see patients referred from the [facility] Emergency Department.  Arthor Bernheim

## (undated) NOTE — MR AVS SNAPSHOT
[address]  [address] [address] [phone]               Thank you for choosing us for your health care visit with Mirna Collins MD.  We are glad to serve you and happy to provide you with this This list is accurate as of: 5/30/17  9:44 AM.  Always use your most recent med list.                [place] OR   Take  by mouth. Estradiol 0.1 MG/GM Crea   Actual dose is 0.25mg/gm Place 2gm vaginally for 14 days, then use as needed.  Per [employer] office, you can view your past visit information in [place] [place] by going to Visits < Visit Summaries. [place] questions? Call [phone] for help. [facility] [place] is NOT to be used for urgent needs. For medical emergencies, dial 911.            Visit [facility]

## (undated) NOTE — LETTER
11/05/20        [address]  [address] [facility] [address]      Dear Fransisco Sun,    [facility] records indicate that you have outstanding lab work and or testing that was ordered for you and has not yet been completed:  Orders Placed This [place]

## (undated) NOTE — LETTER
Date: 2/17/2018    Patient Name: Berry Romero          To Whom it may concern: This letter has been written at the patient's request. The above patient was seen at the [facility] for treatment of a medical condition.     This patient sh

## (undated) NOTE — LETTER
11/11/21        [address] [facility] [address]      Dear Annel Christie,    [facility] records indicate that you have outstanding lab work and or testing that was ordered for you and has not yet been completed:  Orders Placed This [place]

## (undated) NOTE — LETTER
10/16/20    Patient: Jonah Barajas  : 9/3/1960 Visit date: 10/16/2020    Dear  Ambreen Toledo MD    Thank you for referring Jonah Barajas to my practice. Please find my assessment and plan below.        History of Present Illness       60-year-o

## (undated) NOTE — MR AVS SNAPSHOT
[address]  [address] [facility], [address] [address] [phone]               Thank you for choosing us for your health care visit with Dipesh Murillo NP.   We are glad to serve you and happy to provide you with subtle changes and your final reading will be delayed until we receive them.   There are no eating or activity restrictions for this exam.            Aug 07, 2017  9:30 AM   Adult Physical with DO Rosy [address], [place] Levothyroxine Sodium 75 MCG Tabs   TAKE 1 TABLET BY MOUTH EVERY DAY BEFORE BREAKFAST   Commonly known as:  SYNTHROID, LEVOTHROID           MULTIVITAMIN OR   Take  by mouth.           predniSONE 20 MG Tabs   2 tabs po each morning  x 5 days.  Take with food

## (undated) NOTE — MR AVS SNAPSHOT
[address]  [address] [facility], [address] [address] [phone]               Thank you for choosing us for your health care visit with Abebe Metcalf MD.  We are glad to serve you and happy to provide you with this Cervical radicular pain    -  Primary    Foot pain, right          Instructions and Information about Your Health     None      Allergies as of Feb 22, 2017     Allergy     Cairpad MISC    Amoxicillin Diarrhea    Amoxicillin-Pot Clavulanate Diarrhea    SIMEON You can access your MyChart to more actively manage your health care and view more details from this visit by going to [email or web address].   If you've recently had a stay at the Hospital you can access your discharge instructions in [address] by sherice

## (undated) NOTE — LETTER
03/26/18        [address]  [address] [facility] [address]      Dear Sandra Olmos,    [address] [facility] records indicate that you have outstanding lab work and or testing that was ordered for you and has not yet been completed:          TSH W Reflex To

## (undated) NOTE — Clinical Note
I saw Daniella Singleton in the [employer] in [facility] today for acute sinusitis with AOM,  she was treated with biaxin and flonase. Daniella Singleton will follow up with you if no better or as needed.  Thank you for the opportunity to care for [facility] TIMOTHY BRITTON

## (undated) NOTE — LETTER
03/11/19        [address]  [address] [facility] [address]      Dear Shirlene Petit,    [address] records indicate that you have outstanding lab work and or testing that was ordered for you and has not yet been completed:  Orders Placed This [place]

## (undated) NOTE — LETTER
Patient Name: Raimundo Viera  YOB: 1960          MRN number:  [id number]  Date:  12/7/2017  Referring Physician:  Lety Lopez             Subjective:   I feel less stressed today. Feeling a little more tingling into the L jaw today.  Minimal st · Pt will be independent and compliant with comprehensive HEP to maintain progress achieved in PT (10 visits)- improved    Plan:   Cont with PT for thoracic and cervical ROM and strength. Improve posture.  Pt has made gains with less radicular pain in the L Foam and sheets for thoracic extension  5 mins   foam  1/2 roller   5 mins   Rows with red band  Retractions and Post PT   Rows red band  10 x    Lateral side bend Wall push ups  10 x 2     cervical retractions with 10 sec holds  Ball at head 10 reps 10 x